# Patient Record
Sex: FEMALE | HISPANIC OR LATINO | Employment: OTHER | URBAN - METROPOLITAN AREA
[De-identification: names, ages, dates, MRNs, and addresses within clinical notes are randomized per-mention and may not be internally consistent; named-entity substitution may affect disease eponyms.]

---

## 2017-04-26 ENCOUNTER — HOSPITAL ENCOUNTER (OUTPATIENT)
Dept: LAB | Facility: MEDICAL CENTER | Age: 77
End: 2017-04-26
Attending: FAMILY MEDICINE
Payer: MEDICARE

## 2017-04-26 LAB
ALBUMIN SERPL BCP-MCNC: 3.9 G/DL (ref 3.2–4.9)
ALBUMIN/GLOB SERPL: 1 G/DL
ALP SERPL-CCNC: 86 U/L (ref 30–99)
ALT SERPL-CCNC: 16 U/L (ref 2–50)
ANION GAP SERPL CALC-SCNC: 7 MMOL/L (ref 0–11.9)
AST SERPL-CCNC: 20 U/L (ref 12–45)
BASOPHILS # BLD AUTO: 0.8 % (ref 0–1.8)
BASOPHILS # BLD: 0.06 K/UL (ref 0–0.12)
BILIRUB SERPL-MCNC: 0.5 MG/DL (ref 0.1–1.5)
BUN SERPL-MCNC: 14 MG/DL (ref 8–22)
CALCIUM SERPL-MCNC: 9.1 MG/DL (ref 8.5–10.5)
CHLORIDE SERPL-SCNC: 101 MMOL/L (ref 96–112)
CHOLEST SERPL-MCNC: 146 MG/DL (ref 100–199)
CO2 SERPL-SCNC: 31 MMOL/L (ref 20–33)
CREAT SERPL-MCNC: 0.64 MG/DL (ref 0.5–1.4)
EOSINOPHIL # BLD AUTO: 0.29 K/UL (ref 0–0.51)
EOSINOPHIL NFR BLD: 3.7 % (ref 0–6.9)
ERYTHROCYTE [DISTWIDTH] IN BLOOD BY AUTOMATED COUNT: 46.5 FL (ref 35.9–50)
EST. AVERAGE GLUCOSE BLD GHB EST-MCNC: 128 MG/DL
GFR SERPL CREATININE-BSD FRML MDRD: >60 ML/MIN/1.73 M 2
GLOBULIN SER CALC-MCNC: 4.1 G/DL (ref 1.9–3.5)
GLUCOSE SERPL-MCNC: 108 MG/DL (ref 65–99)
HBA1C MFR BLD: 6.1 % (ref 0–5.6)
HCT VFR BLD AUTO: 39.9 % (ref 37–47)
HDLC SERPL-MCNC: 60 MG/DL
HGB BLD-MCNC: 12.5 G/DL (ref 12–16)
IMM GRANULOCYTES # BLD AUTO: 0.02 K/UL (ref 0–0.11)
IMM GRANULOCYTES NFR BLD AUTO: 0.3 % (ref 0–0.9)
LDLC SERPL CALC-MCNC: 73 MG/DL
LYMPHOCYTES # BLD AUTO: 3.02 K/UL (ref 1–4.8)
LYMPHOCYTES NFR BLD: 38.8 % (ref 22–41)
MCH RBC QN AUTO: 27.5 PG (ref 27–33)
MCHC RBC AUTO-ENTMCNC: 31.3 G/DL (ref 33.6–35)
MCV RBC AUTO: 87.7 FL (ref 81.4–97.8)
MONOCYTES # BLD AUTO: 0.55 K/UL (ref 0–0.85)
MONOCYTES NFR BLD AUTO: 7.1 % (ref 0–13.4)
NEUTROPHILS # BLD AUTO: 3.85 K/UL (ref 2–7.15)
NEUTROPHILS NFR BLD: 49.3 % (ref 44–72)
NRBC # BLD AUTO: 0 K/UL
NRBC BLD AUTO-RTO: 0 /100 WBC
PLATELET # BLD AUTO: 296 K/UL (ref 164–446)
PMV BLD AUTO: 10.8 FL (ref 9–12.9)
POTASSIUM SERPL-SCNC: 3.5 MMOL/L (ref 3.6–5.5)
PROT SERPL-MCNC: 8 G/DL (ref 6–8.2)
RBC # BLD AUTO: 4.55 M/UL (ref 4.2–5.4)
SODIUM SERPL-SCNC: 139 MMOL/L (ref 135–145)
T4 FREE SERPL-MCNC: 1.25 NG/DL (ref 0.53–1.43)
TRIGL SERPL-MCNC: 65 MG/DL (ref 0–149)
TSH SERPL DL<=0.005 MIU/L-ACNC: 2.22 UIU/ML (ref 0.3–3.7)
WBC # BLD AUTO: 7.8 K/UL (ref 4.8–10.8)

## 2017-04-26 PROCEDURE — 80053 COMPREHEN METABOLIC PANEL: CPT

## 2017-04-26 PROCEDURE — 83036 HEMOGLOBIN GLYCOSYLATED A1C: CPT

## 2017-04-26 PROCEDURE — 84439 ASSAY OF FREE THYROXINE: CPT

## 2017-04-26 PROCEDURE — 85025 COMPLETE CBC W/AUTO DIFF WBC: CPT

## 2017-04-26 PROCEDURE — 36415 COLL VENOUS BLD VENIPUNCTURE: CPT

## 2017-04-26 PROCEDURE — 84443 ASSAY THYROID STIM HORMONE: CPT

## 2017-04-26 PROCEDURE — 80061 LIPID PANEL: CPT

## 2017-05-23 ENCOUNTER — HOSPITAL ENCOUNTER (OUTPATIENT)
Dept: RADIOLOGY | Facility: MEDICAL CENTER | Age: 77
End: 2017-05-23
Attending: OTOLARYNGOLOGY
Payer: MEDICARE

## 2017-05-23 DIAGNOSIS — H93.3X9: ICD-10-CM

## 2017-05-23 PROCEDURE — 70551 MRI BRAIN STEM W/O DYE: CPT

## 2017-08-17 ENCOUNTER — OFFICE VISIT (OUTPATIENT)
Dept: NEUROLOGY | Facility: MEDICAL CENTER | Age: 77
End: 2017-08-17
Payer: MEDICARE

## 2017-08-17 VITALS
DIASTOLIC BLOOD PRESSURE: 70 MMHG | BODY MASS INDEX: 29.77 KG/M2 | WEIGHT: 138 LBS | TEMPERATURE: 97.3 F | OXYGEN SATURATION: 98 % | SYSTOLIC BLOOD PRESSURE: 110 MMHG | HEIGHT: 57 IN | HEART RATE: 80 BPM | RESPIRATION RATE: 16 BRPM

## 2017-08-17 DIAGNOSIS — H81.4 VERTIGO, CENTRAL ORIGIN, UNSPECIFIED LATERALITY: ICD-10-CM

## 2017-08-17 DIAGNOSIS — I69.393 ATAXIA DUE TO OLD CEREBELLAR INFARCTION: ICD-10-CM

## 2017-08-17 PROCEDURE — 99203 OFFICE O/P NEW LOW 30 MIN: CPT

## 2017-08-17 RX ORDER — MECLIZINE HYDROCHLORIDE 25 MG/1
25 TABLET ORAL 3 TIMES DAILY PRN
Qty: 90 TAB | Refills: 3 | Status: SHIPPED | OUTPATIENT
Start: 2017-08-17

## 2017-08-17 RX ORDER — TRAVOPROST 0.04 MG/ML
SOLUTION/ DROPS OPHTHALMIC 2 TIMES DAILY
COMMUNITY
Start: 2017-08-16

## 2017-08-17 RX ORDER — DORZOLAMIDE HCL 20 MG/ML
SOLUTION/ DROPS OPHTHALMIC 2 TIMES DAILY
COMMUNITY
Start: 2017-08-03

## 2017-08-17 RX ORDER — BRIMONIDINE TARTRATE, TIMOLOL MALEATE 2; 5 MG/ML; MG/ML
SOLUTION/ DROPS OPHTHALMIC
Refills: 4 | COMMUNITY
Start: 2017-07-24

## 2017-08-17 NOTE — MR AVS SNAPSHOT
"        Debra Perezvan   2017 10:20 AM   Office Visit   MRN: [id number]    Department:  [facility]   Dept Phone:  [phone]    Description:  Female : 1940   Provider:  Fransisco Kenny M.D.           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   [681636]       Vertigo, central origin, unspecified laterality   [137447]         Vital Signs     Blood Pressure Pulse Temperature Respirations Height Weight    110/70 mmHg 80 36.3 °C (97.3 °F) 16 1.448 m (4' 9.01\") 62.596 kg (138 lb)    Body Mass Index Oxygen Saturation Smoking Status             29.85 kg/m2 98% Never Smoker          Basic Information     Date Of Birth Sex Race Ethnicity Preferred Language Language for Written Material    1940 Female White,  or   Origin (Polish,Guatemalan,Argentine,Lm, etc) Polish English      Problem List              ICD-10-CM Priority Class Noted - Resolved    Eye abnormality Q15.9   2014 - Present    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   2014 - Present    Essential hypertension I10   10/30/2014 - Present    Hypothyroidism E03.9   10/30/2014 - Present    Thoracic or lumbosacral neuritis or radiculitis, unspecified QMU4309   2015 - Present    Bell's palsy G51.0   2016 - Present    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   2016 - Present    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 H16.219   2016 - Present    Lower eyelid retraction of left eye H02.535   2016 - Present    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      Below and/or attached are the medications your provider expects you to take. Review all of your home medications and newly ordered medications with your provider and/or pharmacist. Follow medication instructions as directed by your provider and/or pharmacist. Please keep your medication list with you and share with your provider. Update the information when medications are discontinued, doses are changed, or new medications (including over-the-counter products) are added; and carry medication information at all times in the event of emergency situations     Allergies:  No Known Allergies          Medications  Valid as of: August 17, 2017 - 10:51 AM    Generic Name Brand Name Tablet Size Instructions for use    Aspirin (Tablet Delayed Response) ECOTRIN 81 MG Take 81 mg by mouth every day.        Brimonidine Tartrate-Timolol (Solution) COMBIGAN 0.2-0.5 % INSTILL ONE DROP IN BOTH EYES TWICE DAILY        Dorzolamide HCl (Solution) TRUSOPT 2 %         Levothyroxine Sodium (Tab) SYNTHROID 88 MCG Take 88 mcg by mouth Every morning on an empty stomach.        Losartan Potassium-HCTZ (Tab) HYZAAR 100-25 MG Take 1 Tab by mouth every morning.        Travoprost (Solution) TRAVATAN Z 0.004 %         .                 Medicines prescribed today were sent to:     [id number] - [place] - [address] [place] [address]    Phone: [phone] Fax: [phone]    Open 24 Hours?: No      Medication refill instructions:       If your prescription bottle indicates you have medication refills left, it is not necessary to call your provider’s office. Please contact your pharmacy and they will refill your medication.    If your prescription bottle  indicates you do not have any refills left, you may request refills at any time through one of the following ways: The online ethology system (except Urgent Care), by calling your provider’s office, or by asking your pharmacy to contact your provider’s office with a refill request. Medication refills are processed only during regular business hours and may not be available until the next business day. Your provider may request additional information or to have a follow-up visit with you prior to refilling your medication.   *Please Note: Medication refills are assigned a new Rx number when refilled electronically. Your pharmacy may indicate that no refills were authorized even though a new prescription for the same medication is available at the pharmacy. Please request the medicine by name with the pharmacy before contacting your provider for a refill.        Referral     A referral request has been sent to our patient care coordination department. Please allow 3-5 business days for us to process this request and contact you either by phone or mail. If you do not hear from us by the 5th business day, please call us at [phone].           ethology Status: Patient Declined

## 2017-08-17 NOTE — PROGRESS NOTES
Neurology Consult Note  8/17/2017      Referring MD:  Dr. GRUPO Garcia    Patient ID:  Name:             Debra Stockton   YOB: 1940  Age:                 77 y.o.  female   MRN:               [id number]                                              Reason for Consult:      Evaluation of balance and vertigo.    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 She had some improvement in vertigo following therapy and continues in therapy currently. She had an ear nose and throat evaluation including an apparent electronystagmogram that did not show for vestibular cause for her vertigo. 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 As a consequence of the surgery she had significant facial weakness facial numbness and ocular movement issues that it required corrective surgery including surgical surgery on the eyelid. 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 Her other medical conditions including hypothyroidism for which she is on thyroxine and medications for her glaucoma as well as losartan hydrochlorothiazide. According to her daughter who accompanied her there is been no change in her neurologic status recently. She has had MRIs done in 2014, 2015 and 2016 and more recently 2017. The cerebellar infarction and infarction of the left side of the vermis is unchanged through those studies. The small residual acoustic neuroma was best seen in the 2016 study and is unchanged in 2017 study.    Review of Systems: First to her balance issues.  Constitutional: Denies fevers, Denies weight changes  Eyes: Denies changes in vision, no eye pain  Ears/Nose/Throat/Mouth: Denies nasal congestion or  sore throat   Cardiovascular: Denies chest pain, Denies palpitations   Respiratory: Denies shortness of breath , Denies cough  Gastrointestinal/Hepatic: Denies abdominal pain, nausea, vomiting, diarrhea, constipation or GI bleeding   Genitourinary: Denies dysuria or frequency  Musculoskeletal/Rheum: Denies  joint pain and swelling, No edema  Skin: Denies rash  Neurological: Denies headache, confusion, memory loss or focal weakness/parasthesias  Psychiatric: denies mood disorder   Endocrine: Mohini thyroid problems  Heme/Oncology/Lymph Nodes: Denies enlarged lymph nodes, denies brusing or known bleeding disorder  All other systems were reviewed and are negative (AMA/CMS criteria)                Past Medical History:     Past Medical History   Diagnosis Date   • Unspecified disorder of thyroid    • Glaucoma    • Hypertension    • Unspecified essential hypertension 10/30/2014   • Unspecified hypothyroidism 10/30/2014   • Heart burn    • Indigestion    • Unspecified cataract      dylan IOL   • Other specified disorder of intestines      constipation   • Dental disorder      dentures Upper and lower       Problems addressed this visit:    Problem List Items Addressed This Visit     None      Visit Diagnoses     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 at [facility]   • Appendectomy  1996   • Lid tightening  8/27/2014     Performed by Omer Servin M.D. at [facility]   • Lumbar laminectomy diskectomy Left 7/17/2015     Procedure:  LUMBAR LAMINECTOMY DISKECTOMY POSTERIOR L5-S1 MICRO;  Surgeon: Abel Salazar M.D.;  Location: [facility];  Service:    • Other  2-3yrs ago     l eye  with multiple surgeries   • Other  2004     left ear tumor removed   • Other  1952     tonsillectomy   • Eye foreign body removal Left 1/29/2016     Procedure: EYE FOREIGN BODY REMOVAL  FOR: REMOVAL OF UPPER EYELID WEIGHT IMPLANT;  Surgeon: Alphonso Montoya M.D.;  Location: SURGERY SAME DAY [facility];  Service:    • Blepharoplasty Left 9/7/2016     Procedure:  PERMANENT TEMPORAL PARTIAL TARSORRHAPHY;  Surgeon: Alphonso Montoya M.D.;  Location: SURGERY [facility];  Service:          Current Outpatient Medications:  Current Outpatient Prescriptions   Medication   • COMBIGAN 0.2-0.5 % Solution   • dorzolamide (TRUSOPT) 2 % Solution   • TRAVATAN Z 0.004 % Solution   • aspirin EC (ECOTRIN) 81 MG Tablet Delayed Response   • levothyroxine (SYNTHROID) 88 MCG TABS   • losartan-hydrochlorothiazide (HYZAAR) 100-25 MG per tablet     No current facility-administered medications for this visit.       Medication Allergy:  No Known Allergies    Family History:  Family History   Problem Relation Age of Onset   • Diabetes Mother    • Cancer Father    • Diabetes Father    • Diabetes Brother        Social History:  Social History     Social History   • Marital Status:      Spouse Name: N/A   • Number of Children: N/A   • Years of Education: N/A     Occupational History   • Not on file.     Social History Main Topics   • Smoking status: Never Smoker    • Smokeless tobacco: Not on file   • Alcohol Use: No   • Drug Use: No   • Sexual Activity: Not on file     Other Topics Concern   • Not on file     Social History Narrative       Physical Exam: This very pleasant Divehi-speaking lady. She has facial Waldron on the left in at least the first and second divisions and decreased pinprick also in those decisions. Her right eye has nystagmus on left gaze. She has had  "fluid surgery on the left eye which is mostly obscured due to corneal angiogenesis. When she walks she has a wide-based gait and does tend to lean to the left side. She has some heel-to-shin dystaxia in the lower extremities but has good position sense and intact reflexes.  Vitals:   Filed Vitals:    08/17/17 1003   BP: 110/70   Pulse: 80   Temp: 36.3 °C (97.3 °F)   Resp: 16   Height: 1.448 m (4' 9.01\")   Weight: 62.596 kg (138 lb)   SpO2: 98%     General Appearance:   Weight/BMI: Body mass index is 29.85 kg/(m^2).    Neurologic Exam:   AOx3: Normal  Recent & remote memory intact: Yes  Attentive with normal coordination: Yes  Normal spontaneous speech pattern: Yes  Age appropriate fund of knowledge: No decreased vision left eye  Cranial nerve II intact: Abnormal decreased movement of the left eye and nystagmus of the right eye on left gaze  Cranial nerve III, IV & VI intact: Abnormal decreased movement of the left eye and nystagmus of the right eye on left gaze  Cranial nerve V intact: Abnormal decreased facial sensation and strength  Cranial nerve VIII intact: Abnormal decreased facial strength and sensation  Cranial nerve IX intact: Normal  Cranial nerve XI intact: Normal  Cranial nerve XII intact: Normal  No sensory deficits: Abnormal decreased facial sensation  DTRs intact & symmetrical: Normal   No dysdiadochokinesia or dysmetria: Abnormal lower extremity dystaxia    Eyes:  Normal optic discs: No blindness left eye  Visual field: Normal  Pupillary responses: Normal  Extraocular movement: Abnormal decreased movement of the left    Cardiovascular:  Normal carotid pulses bilaterally: Yes  RRR with no MRGs: Yes  No peripheral edema, pulses intact: Yes    Musculoskeletal:  Normal gait and station: No ataxia due to cerebellar disease  Normal muscle strength: Yes  Normal muscle tone, no atrophy: Yes  No abnormal movements: Yes    Plan:   I reviewed all of the MRIs and see no significant change from 2014 through 2017 " and what differences are in the report are largely related to differences in field strength of the various instruments. Most of her vertigo and ataxia is related to the cerebellar injury that occurred during the surgery. There is no remedy for this same physical therapy which was ordered for her. She will be seen again as needed.    Total length of time of this visit was 40 minutes of which greater than 50% was spent counseling the patient/family and coordinating care.    Thank you for allowing me to participate in his care.    Sincerely yours,        Fransisco Kenny MD, PhD

## 2017-08-28 ENCOUNTER — TELEPHONE (OUTPATIENT)
Dept: NEUROLOGY | Facility: MEDICAL CENTER | Age: 77
End: 2017-08-28

## 2017-08-28 NOTE — TELEPHONE ENCOUNTER
Dr. Beal referred the pt to PT, referral dept sent the pt to [facility] but the pt is already established with [facility]. And they are asking for a new referral to their office. Will you please put a new referral in Dr. Beal's absence. Thank you.

## 2017-09-07 DIAGNOSIS — R27.0 ATAXIA: ICD-10-CM

## 2017-09-07 NOTE — TELEPHONE ENCOUNTER
Message   Received: Today   Message Contents   Tiffanie Frost P.A.-C.  Brinda Galeano, Med Ass't   Caller: Unspecified (1 week ago)             done      Princess pt's relative was informed. Per her request referral was faxed to her at [phone]. Fax confirmation is scanned into media.

## 2017-09-16 ENCOUNTER — HOSPITAL ENCOUNTER (OUTPATIENT)
Dept: RADIOLOGY | Facility: MEDICAL CENTER | Age: 77
End: 2017-09-16
Attending: SURGERY
Payer: MEDICARE

## 2017-09-16 DIAGNOSIS — I82.411 DEEP VEIN THROMBOSIS (DVT) OF FEMORAL VEIN OF RIGHT LOWER EXTREMITY, UNSPECIFIED CHRONICITY (HCC): ICD-10-CM

## 2017-09-16 PROCEDURE — 93971 EXTREMITY STUDY: CPT | Mod: 26 | Performed by: SURGERY

## 2017-09-16 PROCEDURE — 93971 EXTREMITY STUDY: CPT

## 2017-09-26 ENCOUNTER — HOSPITAL ENCOUNTER (OUTPATIENT)
Dept: LAB | Facility: MEDICAL CENTER | Age: 77
End: 2017-09-26
Attending: FAMILY MEDICINE
Payer: MEDICARE

## 2017-09-26 LAB
ALBUMIN SERPL BCP-MCNC: 3.6 G/DL (ref 3.2–4.9)
ALBUMIN/GLOB SERPL: 0.8 G/DL
ALP SERPL-CCNC: 77 U/L (ref 30–99)
ALT SERPL-CCNC: 10 U/L (ref 2–50)
ANION GAP SERPL CALC-SCNC: 7 MMOL/L (ref 0–11.9)
AST SERPL-CCNC: 15 U/L (ref 12–45)
BILIRUB SERPL-MCNC: 0.5 MG/DL (ref 0.1–1.5)
BUN SERPL-MCNC: 23 MG/DL (ref 8–22)
CALCIUM SERPL-MCNC: 8.9 MG/DL (ref 8.5–10.5)
CHLORIDE SERPL-SCNC: 101 MMOL/L (ref 96–112)
CHOLEST SERPL-MCNC: 137 MG/DL (ref 100–199)
CO2 SERPL-SCNC: 31 MMOL/L (ref 20–33)
CREAT SERPL-MCNC: 1 MG/DL (ref 0.5–1.4)
EST. AVERAGE GLUCOSE BLD GHB EST-MCNC: 134 MG/DL
GFR SERPL CREATININE-BSD FRML MDRD: 54 ML/MIN/1.73 M 2
GLOBULIN SER CALC-MCNC: 4.3 G/DL (ref 1.9–3.5)
GLUCOSE SERPL-MCNC: 104 MG/DL (ref 65–99)
HBA1C MFR BLD: 6.3 % (ref 0–5.6)
HDLC SERPL-MCNC: 50 MG/DL
LDLC SERPL CALC-MCNC: 70 MG/DL
POTASSIUM SERPL-SCNC: 3.9 MMOL/L (ref 3.6–5.5)
PROT SERPL-MCNC: 7.9 G/DL (ref 6–8.2)
SODIUM SERPL-SCNC: 139 MMOL/L (ref 135–145)
T4 FREE SERPL-MCNC: 1.21 NG/DL (ref 0.53–1.43)
TRIGL SERPL-MCNC: 87 MG/DL (ref 0–149)
TSH SERPL DL<=0.005 MIU/L-ACNC: 5.13 UIU/ML (ref 0.3–3.7)

## 2017-09-26 PROCEDURE — 84443 ASSAY THYROID STIM HORMONE: CPT

## 2017-09-26 PROCEDURE — 80053 COMPREHEN METABOLIC PANEL: CPT

## 2017-09-26 PROCEDURE — 83036 HEMOGLOBIN GLYCOSYLATED A1C: CPT

## 2017-09-26 PROCEDURE — 36415 COLL VENOUS BLD VENIPUNCTURE: CPT

## 2017-09-26 PROCEDURE — 84439 ASSAY OF FREE THYROXINE: CPT

## 2017-09-26 PROCEDURE — 80061 LIPID PANEL: CPT

## 2017-12-05 ENCOUNTER — TELEPHONE (OUTPATIENT)
Dept: NEUROLOGY | Facility: MEDICAL CENTER | Age: 77
End: 2017-12-05

## 2017-12-06 DIAGNOSIS — G11.9 ATAXIA DUE TO CEREBELLAR DEGENERATION (HCC): ICD-10-CM

## 2017-12-11 NOTE — TELEPHONE ENCOUNTER
Message   Received: 5 days ago   Message Contents   Fransisco Kenny M.D.  Brinda Galeano, Med Ass't   Caller: Unspecified (6 days ago, 11:24 AM)             Referral done     Referral faxed to [facility] fax confirmation is scanned into media.

## 2018-02-07 ENCOUNTER — HOSPITAL ENCOUNTER (OUTPATIENT)
Dept: RADIOLOGY | Facility: MEDICAL CENTER | Age: 78
End: 2018-02-07
Attending: FAMILY MEDICINE
Payer: MEDICARE

## 2018-02-07 DIAGNOSIS — M85.88 OTHER SPECIFIED DISORDERS OF BONE DENSITY AND STRUCTURE, OTHER SITE: ICD-10-CM

## 2018-02-07 PROCEDURE — 77080 DXA BONE DENSITY AXIAL: CPT

## 2018-02-08 ENCOUNTER — APPOINTMENT (OUTPATIENT)
Dept: RADIOLOGY | Facility: MEDICAL CENTER | Age: 78
End: 2018-02-08
Attending: FAMILY MEDICINE
Payer: MEDICARE

## 2018-04-27 ENCOUNTER — HOSPITAL ENCOUNTER (OUTPATIENT)
Dept: RADIOLOGY | Facility: MEDICAL CENTER | Age: 78
End: 2018-04-27
Attending: FAMILY MEDICINE
Payer: MEDICARE

## 2018-04-27 DIAGNOSIS — M54.16 LUMBAR RADICULOPATHY: ICD-10-CM

## 2018-04-27 PROCEDURE — 72148 MRI LUMBAR SPINE W/O DYE: CPT

## 2018-06-05 ENCOUNTER — HOSPITAL ENCOUNTER (OUTPATIENT)
Dept: RADIOLOGY | Facility: MEDICAL CENTER | Age: 78
End: 2018-06-05
Attending: CLINICAL NURSE SPECIALIST
Payer: MEDICARE

## 2018-06-05 DIAGNOSIS — R26.9 ABNORMALITY OF GAIT: ICD-10-CM

## 2018-06-05 DIAGNOSIS — M54.12 CERVICAL RADICULITIS: ICD-10-CM

## 2018-06-05 PROCEDURE — 72141 MRI NECK SPINE W/O DYE: CPT

## 2018-06-05 PROCEDURE — 70551 MRI BRAIN STEM W/O DYE: CPT

## 2018-06-15 DIAGNOSIS — Z01.812 PRE-PROCEDURAL LABORATORY EXAMINATION: ICD-10-CM

## 2018-06-15 DIAGNOSIS — Z01.810 PRE-OPERATIVE CARDIOVASCULAR EXAMINATION: ICD-10-CM

## 2018-06-15 LAB
ABO GROUP BLD: NORMAL
ANION GAP SERPL CALC-SCNC: 9 MMOL/L (ref 0–11.9)
APPEARANCE UR: CLEAR
APTT PPP: 32.9 SEC (ref 24.7–36)
BACTERIA #/AREA URNS HPF: NEGATIVE /HPF
BASOPHILS # BLD AUTO: 0.5 % (ref 0–1.8)
BASOPHILS # BLD: 0.04 K/UL (ref 0–0.12)
BILIRUB UR QL STRIP.AUTO: NEGATIVE
BLD GP AB SCN SERPL QL: NORMAL
BUN SERPL-MCNC: 18 MG/DL (ref 8–22)
CALCIUM SERPL-MCNC: 9.3 MG/DL (ref 8.5–10.5)
CHLORIDE SERPL-SCNC: 102 MMOL/L (ref 96–112)
CO2 SERPL-SCNC: 29 MMOL/L (ref 20–33)
COLOR UR: YELLOW
CREAT SERPL-MCNC: 0.88 MG/DL (ref 0.5–1.4)
EKG IMPRESSION: NORMAL
EOSINOPHIL # BLD AUTO: 0.16 K/UL (ref 0–0.51)
EOSINOPHIL NFR BLD: 2.2 % (ref 0–6.9)
EPI CELLS #/AREA URNS HPF: ABNORMAL /HPF
ERYTHROCYTE [DISTWIDTH] IN BLOOD BY AUTOMATED COUNT: 48 FL (ref 35.9–50)
GLUCOSE SERPL-MCNC: 102 MG/DL (ref 65–99)
GLUCOSE UR STRIP.AUTO-MCNC: NEGATIVE MG/DL
HCT VFR BLD AUTO: 39.6 % (ref 37–47)
HGB BLD-MCNC: 12.5 G/DL (ref 12–16)
HYALINE CASTS #/AREA URNS LPF: ABNORMAL /LPF
IMM GRANULOCYTES # BLD AUTO: 0.03 K/UL (ref 0–0.11)
IMM GRANULOCYTES NFR BLD AUTO: 0.4 % (ref 0–0.9)
INR PPP: 1.08 (ref 0.87–1.13)
KETONES UR STRIP.AUTO-MCNC: NEGATIVE MG/DL
LEUKOCYTE ESTERASE UR QL STRIP.AUTO: ABNORMAL
LYMPHOCYTES # BLD AUTO: 2.13 K/UL (ref 1–4.8)
LYMPHOCYTES NFR BLD: 28.8 % (ref 22–41)
MCH RBC QN AUTO: 27.4 PG (ref 27–33)
MCHC RBC AUTO-ENTMCNC: 31.6 G/DL (ref 33.6–35)
MCV RBC AUTO: 86.7 FL (ref 81.4–97.8)
MICRO URNS: ABNORMAL
MONOCYTES # BLD AUTO: 0.49 K/UL (ref 0–0.85)
MONOCYTES NFR BLD AUTO: 6.6 % (ref 0–13.4)
NEUTROPHILS # BLD AUTO: 4.55 K/UL (ref 2–7.15)
NEUTROPHILS NFR BLD: 61.5 % (ref 44–72)
NITRITE UR QL STRIP.AUTO: NEGATIVE
NRBC # BLD AUTO: 0 K/UL
NRBC BLD-RTO: 0 /100 WBC
PH UR STRIP.AUTO: 6.5 [PH]
PLATELET # BLD AUTO: 316 K/UL (ref 164–446)
PMV BLD AUTO: 10.5 FL (ref 9–12.9)
POTASSIUM SERPL-SCNC: 3.3 MMOL/L (ref 3.6–5.5)
PROT UR QL STRIP: NEGATIVE MG/DL
PROTHROMBIN TIME: 13.7 SEC (ref 12–14.6)
RBC # BLD AUTO: 4.57 M/UL (ref 4.2–5.4)
RBC # URNS HPF: ABNORMAL /HPF
RBC UR QL AUTO: ABNORMAL
RH BLD: NORMAL
SODIUM SERPL-SCNC: 140 MMOL/L (ref 135–145)
SP GR UR STRIP.AUTO: 1.01
UROBILINOGEN UR STRIP.AUTO-MCNC: 0.2 MG/DL
WBC # BLD AUTO: 7.4 K/UL (ref 4.8–10.8)
WBC #/AREA URNS HPF: ABNORMAL /HPF

## 2018-06-15 PROCEDURE — 93010 ELECTROCARDIOGRAM REPORT: CPT | Performed by: INTERNAL MEDICINE

## 2018-06-15 PROCEDURE — 81001 URINALYSIS AUTO W/SCOPE: CPT

## 2018-06-15 PROCEDURE — 93005 ELECTROCARDIOGRAM TRACING: CPT

## 2018-06-15 PROCEDURE — 86901 BLOOD TYPING SEROLOGIC RH(D): CPT

## 2018-06-15 PROCEDURE — 85025 COMPLETE CBC W/AUTO DIFF WBC: CPT

## 2018-06-15 PROCEDURE — 80048 BASIC METABOLIC PNL TOTAL CA: CPT

## 2018-06-15 PROCEDURE — 85730 THROMBOPLASTIN TIME PARTIAL: CPT

## 2018-06-15 PROCEDURE — 86850 RBC ANTIBODY SCREEN: CPT

## 2018-06-15 PROCEDURE — 36415 COLL VENOUS BLD VENIPUNCTURE: CPT

## 2018-06-15 PROCEDURE — 86900 BLOOD TYPING SEROLOGIC ABO: CPT

## 2018-06-15 PROCEDURE — 85610 PROTHROMBIN TIME: CPT

## 2018-06-15 PROCEDURE — 87086 URINE CULTURE/COLONY COUNT: CPT

## 2018-06-15 RX ORDER — LEVOTHYROXINE SODIUM 0.07 MG/1
75 TABLET ORAL
COMMUNITY
End: 2023-08-31

## 2018-06-17 LAB
BACTERIA UR CULT: NORMAL
SIGNIFICANT IND 70042: NORMAL
SITE SITE: NORMAL
SOURCE SOURCE: NORMAL

## 2018-06-25 ENCOUNTER — APPOINTMENT (OUTPATIENT)
Dept: RADIOLOGY | Facility: MEDICAL CENTER | Age: 78
DRG: 472 | End: 2018-06-25
Attending: NEUROLOGICAL SURGERY
Payer: MEDICARE

## 2018-06-25 ENCOUNTER — HOSPITAL ENCOUNTER (INPATIENT)
Facility: MEDICAL CENTER | Age: 78
LOS: 3 days | DRG: 472 | End: 2018-06-28
Attending: NEUROLOGICAL SURGERY | Admitting: NEUROLOGICAL SURGERY
Payer: MEDICARE

## 2018-06-25 DIAGNOSIS — G95.9 CERVICAL MYELOPATHY WITH CERVICAL RADICULOPATHY (HCC): ICD-10-CM

## 2018-06-25 DIAGNOSIS — M54.12 CERVICAL MYELOPATHY WITH CERVICAL RADICULOPATHY (HCC): ICD-10-CM

## 2018-06-25 LAB
ABO GROUP BLD: NORMAL
BLD GP AB SCN SERPL QL: NORMAL
RH BLD: NORMAL

## 2018-06-25 PROCEDURE — 160002 HCHG RECOVERY MINUTES (STAT): Performed by: NEUROLOGICAL SURGERY

## 2018-06-25 PROCEDURE — 110371 HCHG SHELL REV 272: Performed by: NEUROLOGICAL SURGERY

## 2018-06-25 PROCEDURE — 700111 HCHG RX REV CODE 636 W/ 250 OVERRIDE (IP)

## 2018-06-25 PROCEDURE — 95937 NEUROMUSCULAR JUNCTION TEST: CPT | Performed by: NEUROLOGICAL SURGERY

## 2018-06-25 PROCEDURE — C1821 INTERSPINOUS IMPLANT: HCPCS | Performed by: NEUROLOGICAL SURGERY

## 2018-06-25 PROCEDURE — A6402 STERILE GAUZE <= 16 SQ IN: HCPCS | Performed by: NEUROLOGICAL SURGERY

## 2018-06-25 PROCEDURE — 160009 HCHG ANES TIME/MIN: Performed by: NEUROLOGICAL SURGERY

## 2018-06-25 PROCEDURE — 700101 HCHG RX REV CODE 250

## 2018-06-25 PROCEDURE — 160036 HCHG PACU - EA ADDL 30 MINS PHASE I: Performed by: NEUROLOGICAL SURGERY

## 2018-06-25 PROCEDURE — 86901 BLOOD TYPING SEROLOGIC RH(D): CPT

## 2018-06-25 PROCEDURE — 700112 HCHG RX REV CODE 229: Performed by: NURSE PRACTITIONER

## 2018-06-25 PROCEDURE — 160041 HCHG SURGERY MINUTES - EA ADDL 1 MIN LEVEL 4: Performed by: NEUROLOGICAL SURGERY

## 2018-06-25 PROCEDURE — C1713 ANCHOR/SCREW BN/BN,TIS/BN: HCPCS | Performed by: NEUROLOGICAL SURGERY

## 2018-06-25 PROCEDURE — 95940 IONM IN OPERATNG ROOM 15 MIN: CPT | Performed by: NEUROLOGICAL SURGERY

## 2018-06-25 PROCEDURE — 160035 HCHG PACU - 1ST 60 MINS PHASE I: Performed by: NEUROLOGICAL SURGERY

## 2018-06-25 PROCEDURE — 86850 RBC ANTIBODY SCREEN: CPT

## 2018-06-25 PROCEDURE — 95939 C MOTOR EVOKED UPR&LWR LIMBS: CPT | Performed by: NEUROLOGICAL SURGERY

## 2018-06-25 PROCEDURE — 700111 HCHG RX REV CODE 636 W/ 250 OVERRIDE (IP): Performed by: NURSE PRACTITIONER

## 2018-06-25 PROCEDURE — 4A11X4G MONITORING OF PERIPHERAL NERVOUS ELECTRICAL ACTIVITY, INTRAOPERATIVE, EXTERNAL APPROACH: ICD-10-PCS | Performed by: NEUROLOGICAL SURGERY

## 2018-06-25 PROCEDURE — 110454 HCHG SHELL REV 250: Performed by: NEUROLOGICAL SURGERY

## 2018-06-25 PROCEDURE — 501838 HCHG SUTURE GENERAL: Performed by: NEUROLOGICAL SURGERY

## 2018-06-25 PROCEDURE — 700102 HCHG RX REV CODE 250 W/ 637 OVERRIDE(OP): Performed by: NURSE PRACTITIONER

## 2018-06-25 PROCEDURE — 0RG20A0 FUSION OF 2 OR MORE CERVICAL VERTEBRAL JOINTS WITH INTERBODY FUSION DEVICE, ANTERIOR APPROACH, ANTERIOR COLUMN, OPEN APPROACH: ICD-10-PCS | Performed by: NEUROLOGICAL SURGERY

## 2018-06-25 PROCEDURE — A9270 NON-COVERED ITEM OR SERVICE: HCPCS | Performed by: NURSE PRACTITIONER

## 2018-06-25 PROCEDURE — 500864 HCHG NEEDLE, SPINAL 18G: Performed by: NEUROLOGICAL SURGERY

## 2018-06-25 PROCEDURE — 160048 HCHG OR STATISTICAL LEVEL 1-5: Performed by: NEUROLOGICAL SURGERY

## 2018-06-25 PROCEDURE — A9270 NON-COVERED ITEM OR SERVICE: HCPCS

## 2018-06-25 PROCEDURE — 95861 NEEDLE EMG 2 EXTREMITIES: CPT | Performed by: NEUROLOGICAL SURGERY

## 2018-06-25 PROCEDURE — 72040 X-RAY EXAM NECK SPINE 2-3 VW: CPT

## 2018-06-25 PROCEDURE — 95938 SOMATOSENSORY TESTING: CPT | Performed by: NEUROLOGICAL SURGERY

## 2018-06-25 PROCEDURE — 700102 HCHG RX REV CODE 250 W/ 637 OVERRIDE(OP)

## 2018-06-25 PROCEDURE — 86900 BLOOD TYPING SEROLOGIC ABO: CPT

## 2018-06-25 PROCEDURE — 0RT30ZZ RESECTION OF CERVICAL VERTEBRAL DISC, OPEN APPROACH: ICD-10-PCS | Performed by: NEUROLOGICAL SURGERY

## 2018-06-25 PROCEDURE — 770001 HCHG ROOM/CARE - MED/SURG/GYN PRIV*

## 2018-06-25 PROCEDURE — 160029 HCHG SURGERY MINUTES - 1ST 30 MINS LEVEL 4: Performed by: NEUROLOGICAL SURGERY

## 2018-06-25 PROCEDURE — 500122 HCHG BOVIE, BLADE: Performed by: NEUROLOGICAL SURGERY

## 2018-06-25 PROCEDURE — 700101 HCHG RX REV CODE 250: Performed by: NURSE PRACTITIONER

## 2018-06-25 DEVICE — IMPLANTABLE DEVICE: Type: IMPLANTABLE DEVICE | Status: FUNCTIONAL

## 2018-06-25 DEVICE — SCREW VARIABLE ANGLE XTEND SELF-DRILLING 4.2MM 14MM (1TCONX10=10): Type: IMPLANTABLE DEVICE | Status: FUNCTIONAL

## 2018-06-25 RX ORDER — METHOCARBAMOL 750 MG/1
750 TABLET, FILM COATED ORAL EVERY 8 HOURS PRN
Status: DISCONTINUED | OUTPATIENT
Start: 2018-06-25 | End: 2018-06-27

## 2018-06-25 RX ORDER — SODIUM CHLORIDE AND POTASSIUM CHLORIDE 150; 900 MG/100ML; MG/100ML
INJECTION, SOLUTION INTRAVENOUS CONTINUOUS
Status: DISCONTINUED | OUTPATIENT
Start: 2018-06-25 | End: 2018-06-28 | Stop reason: HOSPADM

## 2018-06-25 RX ORDER — LOSARTAN POTASSIUM AND HYDROCHLOROTHIAZIDE 25; 100 MG/1; MG/1
1 TABLET ORAL EVERY MORNING
Status: DISCONTINUED | OUTPATIENT
Start: 2018-06-26 | End: 2018-06-25

## 2018-06-25 RX ORDER — METHOCARBAMOL 750 MG/1
TABLET, FILM COATED ORAL
Status: COMPLETED
Start: 2018-06-25 | End: 2018-06-25

## 2018-06-25 RX ORDER — BISACODYL 10 MG
10 SUPPOSITORY, RECTAL RECTAL
Status: DISCONTINUED | OUTPATIENT
Start: 2018-06-25 | End: 2018-06-28 | Stop reason: HOSPADM

## 2018-06-25 RX ORDER — SODIUM CHLORIDE, SODIUM LACTATE, POTASSIUM CHLORIDE, CALCIUM CHLORIDE 600; 310; 30; 20 MG/100ML; MG/100ML; MG/100ML; MG/100ML
INJECTION, SOLUTION INTRAVENOUS CONTINUOUS
Status: DISCONTINUED | OUTPATIENT
Start: 2018-06-25 | End: 2018-06-28 | Stop reason: HOSPADM

## 2018-06-25 RX ORDER — AMOXICILLIN 250 MG
1 CAPSULE ORAL
Status: DISCONTINUED | OUTPATIENT
Start: 2018-06-25 | End: 2018-06-28 | Stop reason: HOSPADM

## 2018-06-25 RX ORDER — HALOPERIDOL 5 MG/ML
INJECTION INTRAMUSCULAR
Status: COMPLETED
Start: 2018-06-25 | End: 2018-06-25

## 2018-06-25 RX ORDER — HYDROCHLOROTHIAZIDE 25 MG/1
25 TABLET ORAL
Status: DISCONTINUED | OUTPATIENT
Start: 2018-06-26 | End: 2018-06-28 | Stop reason: HOSPADM

## 2018-06-25 RX ORDER — LIDOCAINE HYDROCHLORIDE 10 MG/ML
INJECTION, SOLUTION INFILTRATION; PERINEURAL
Status: COMPLETED
Start: 2018-06-25 | End: 2018-06-25

## 2018-06-25 RX ORDER — BUPIVACAINE HYDROCHLORIDE AND EPINEPHRINE 5; 5 MG/ML; UG/ML
INJECTION, SOLUTION EPIDURAL; INTRACAUDAL; PERINEURAL
Status: DISCONTINUED | OUTPATIENT
Start: 2018-06-25 | End: 2018-06-25 | Stop reason: HOSPADM

## 2018-06-25 RX ORDER — TRAVOPROST OPHTHALMIC SOLUTION 0.04 MG/ML
1 SOLUTION OPHTHALMIC 2 TIMES DAILY
Status: DISCONTINUED | OUTPATIENT
Start: 2018-06-25 | End: 2018-06-25

## 2018-06-25 RX ORDER — DIPHENHYDRAMINE HYDROCHLORIDE 50 MG/ML
25 INJECTION INTRAMUSCULAR; INTRAVENOUS EVERY 6 HOURS PRN
Status: DISCONTINUED | OUTPATIENT
Start: 2018-06-25 | End: 2018-06-28 | Stop reason: HOSPADM

## 2018-06-25 RX ORDER — CALCIUM CARBONATE 500 MG/1
500 TABLET, CHEWABLE ORAL 2 TIMES DAILY
Status: DISCONTINUED | OUTPATIENT
Start: 2018-06-25 | End: 2018-06-28 | Stop reason: HOSPADM

## 2018-06-25 RX ORDER — HYDRALAZINE HYDROCHLORIDE 20 MG/ML
10 INJECTION INTRAMUSCULAR; INTRAVENOUS
Status: DISCONTINUED | OUTPATIENT
Start: 2018-06-25 | End: 2018-06-28 | Stop reason: HOSPADM

## 2018-06-25 RX ORDER — OXYCODONE HYDROCHLORIDE 5 MG/1
5 TABLET ORAL
Status: DISCONTINUED | OUTPATIENT
Start: 2018-06-25 | End: 2018-06-28 | Stop reason: HOSPADM

## 2018-06-25 RX ORDER — LABETALOL HYDROCHLORIDE 5 MG/ML
10 INJECTION, SOLUTION INTRAVENOUS
Status: DISCONTINUED | OUTPATIENT
Start: 2018-06-25 | End: 2018-06-28 | Stop reason: HOSPADM

## 2018-06-25 RX ORDER — ONDANSETRON 4 MG/1
4 TABLET, ORALLY DISINTEGRATING ORAL EVERY 4 HOURS PRN
Status: DISCONTINUED | OUTPATIENT
Start: 2018-06-25 | End: 2018-06-28 | Stop reason: HOSPADM

## 2018-06-25 RX ORDER — ENEMA 19; 7 G/133ML; G/133ML
1 ENEMA RECTAL
Status: DISCONTINUED | OUTPATIENT
Start: 2018-06-25 | End: 2018-06-28 | Stop reason: HOSPADM

## 2018-06-25 RX ORDER — DOCUSATE SODIUM 100 MG/1
100 CAPSULE, LIQUID FILLED ORAL 2 TIMES DAILY
Status: DISCONTINUED | OUTPATIENT
Start: 2018-06-25 | End: 2018-06-28 | Stop reason: HOSPADM

## 2018-06-25 RX ORDER — CLONIDINE HYDROCHLORIDE 0.1 MG/1
0.1 TABLET ORAL EVERY 4 HOURS PRN
Status: DISCONTINUED | OUTPATIENT
Start: 2018-06-25 | End: 2018-06-28 | Stop reason: HOSPADM

## 2018-06-25 RX ORDER — BRIMONIDINE TARTRATE AND TIMOLOL MALEATE 2; 5 MG/ML; MG/ML
1 SOLUTION OPHTHALMIC DAILY
Status: DISCONTINUED | OUTPATIENT
Start: 2018-06-26 | End: 2018-06-25

## 2018-06-25 RX ORDER — LATANOPROST 50 UG/ML
1 SOLUTION/ DROPS OPHTHALMIC EVERY EVENING
Status: DISCONTINUED | OUTPATIENT
Start: 2018-06-25 | End: 2018-06-28 | Stop reason: HOSPADM

## 2018-06-25 RX ORDER — ACETAMINOPHEN 500 MG
1000 TABLET ORAL EVERY 6 HOURS
Status: DISCONTINUED | OUTPATIENT
Start: 2018-06-26 | End: 2018-06-28 | Stop reason: HOSPADM

## 2018-06-25 RX ORDER — DORZOLAMIDE HCL 20 MG/ML
1 SOLUTION/ DROPS OPHTHALMIC 2 TIMES DAILY
Status: DISCONTINUED | OUTPATIENT
Start: 2018-06-25 | End: 2018-06-28 | Stop reason: HOSPADM

## 2018-06-25 RX ORDER — AMOXICILLIN 250 MG
1 CAPSULE ORAL NIGHTLY
Status: DISCONTINUED | OUTPATIENT
Start: 2018-06-25 | End: 2018-06-28 | Stop reason: HOSPADM

## 2018-06-25 RX ORDER — OXYCODONE HYDROCHLORIDE 10 MG/1
10 TABLET ORAL
Status: DISCONTINUED | OUTPATIENT
Start: 2018-06-25 | End: 2018-06-28 | Stop reason: HOSPADM

## 2018-06-25 RX ORDER — LOSARTAN POTASSIUM 50 MG/1
100 TABLET ORAL
Status: DISCONTINUED | OUTPATIENT
Start: 2018-06-26 | End: 2018-06-28 | Stop reason: HOSPADM

## 2018-06-25 RX ORDER — LEVOTHYROXINE SODIUM 0.07 MG/1
75 TABLET ORAL
Status: DISCONTINUED | OUTPATIENT
Start: 2018-06-26 | End: 2018-06-28 | Stop reason: HOSPADM

## 2018-06-25 RX ORDER — BACITRACIN 50000 [IU]/1
INJECTION, POWDER, FOR SOLUTION INTRAMUSCULAR
Status: DISCONTINUED | OUTPATIENT
Start: 2018-06-25 | End: 2018-06-25 | Stop reason: HOSPADM

## 2018-06-25 RX ORDER — MORPHINE SULFATE 4 MG/ML
4 INJECTION, SOLUTION INTRAMUSCULAR; INTRAVENOUS
Status: DISCONTINUED | OUTPATIENT
Start: 2018-06-25 | End: 2018-06-28 | Stop reason: HOSPADM

## 2018-06-25 RX ORDER — DIAZEPAM 2 MG/1
5 TABLET ORAL EVERY 4 HOURS PRN
Status: DISCONTINUED | OUTPATIENT
Start: 2018-06-25 | End: 2018-06-27

## 2018-06-25 RX ORDER — CEFAZOLIN SODIUM 2 G/100ML
2 INJECTION, SOLUTION INTRAVENOUS EVERY 8 HOURS
Status: COMPLETED | OUTPATIENT
Start: 2018-06-26 | End: 2018-06-26

## 2018-06-25 RX ORDER — DIPHENHYDRAMINE HCL 25 MG
25 TABLET ORAL EVERY 6 HOURS PRN
Status: DISCONTINUED | OUTPATIENT
Start: 2018-06-25 | End: 2018-06-28 | Stop reason: HOSPADM

## 2018-06-25 RX ORDER — MECLIZINE HYDROCHLORIDE 25 MG/1
25 TABLET ORAL 3 TIMES DAILY PRN
Status: DISCONTINUED | OUTPATIENT
Start: 2018-06-25 | End: 2018-06-28 | Stop reason: HOSPADM

## 2018-06-25 RX ORDER — ACETAMINOPHEN 500 MG
TABLET ORAL
Status: COMPLETED
Start: 2018-06-25 | End: 2018-06-25

## 2018-06-25 RX ORDER — POLYETHYLENE GLYCOL 3350 17 G/17G
1 POWDER, FOR SOLUTION ORAL 2 TIMES DAILY PRN
Status: DISCONTINUED | OUTPATIENT
Start: 2018-06-25 | End: 2018-06-28 | Stop reason: HOSPADM

## 2018-06-25 RX ORDER — ONDANSETRON 2 MG/ML
4 INJECTION INTRAMUSCULAR; INTRAVENOUS EVERY 4 HOURS PRN
Status: DISCONTINUED | OUTPATIENT
Start: 2018-06-25 | End: 2018-06-28 | Stop reason: HOSPADM

## 2018-06-25 RX ORDER — CYCLOBENZAPRINE HCL 10 MG
10 TABLET ORAL EVERY 8 HOURS PRN
Status: DISCONTINUED | OUTPATIENT
Start: 2018-06-25 | End: 2018-06-27

## 2018-06-25 RX ORDER — LIDOCAINE HYDROCHLORIDE 10 MG/ML
0.5 INJECTION, SOLUTION INFILTRATION; PERINEURAL
Status: ACTIVE | OUTPATIENT
Start: 2018-06-25 | End: 2018-06-26

## 2018-06-25 RX ORDER — OXYCODONE HCL 5 MG/5 ML
SOLUTION, ORAL ORAL
Status: COMPLETED
Start: 2018-06-25 | End: 2018-06-25

## 2018-06-25 RX ADMIN — DORZOLAMIDE HYDROCHLORIDE 1 DROP: 20 SOLUTION/ DROPS OPHTHALMIC at 22:58

## 2018-06-25 RX ADMIN — HALOPERIDOL LACTATE 1 MG: 5 INJECTION, SOLUTION INTRAMUSCULAR at 19:33

## 2018-06-25 RX ADMIN — POTASSIUM CHLORIDE AND SODIUM CHLORIDE: 900; 150 INJECTION, SOLUTION INTRAVENOUS at 22:38

## 2018-06-25 RX ADMIN — ANTACID TABLETS 500 MG: 500 TABLET, CHEWABLE ORAL at 22:38

## 2018-06-25 RX ADMIN — LIDOCAINE HYDROCHLORIDE 0.5 ML: 10 INJECTION, SOLUTION INFILTRATION; PERINEURAL at 13:51

## 2018-06-25 RX ADMIN — METHOCARBAMOL 750 MG: 750 TABLET, FILM COATED ORAL at 18:45

## 2018-06-25 RX ADMIN — CEFAZOLIN SODIUM 2 G: 2 INJECTION, SOLUTION INTRAVENOUS at 22:38

## 2018-06-25 RX ADMIN — STANDARDIZED SENNA CONCENTRATE AND DOCUSATE SODIUM 1 TABLET: 8.6; 5 TABLET, FILM COATED ORAL at 22:38

## 2018-06-25 RX ADMIN — METHOCARBAMOL 750 MG: 750 TABLET ORAL at 18:45

## 2018-06-25 RX ADMIN — DOCUSATE SODIUM 100 MG: 100 CAPSULE ORAL at 22:38

## 2018-06-25 RX ADMIN — SODIUM CHLORIDE, SODIUM LACTATE, POTASSIUM CHLORIDE, CALCIUM CHLORIDE: 600; 310; 30; 20 INJECTION, SOLUTION INTRAVENOUS at 13:54

## 2018-06-25 RX ADMIN — FENTANYL CITRATE 50 MCG: 50 INJECTION, SOLUTION INTRAMUSCULAR; INTRAVENOUS at 18:15

## 2018-06-25 RX ADMIN — LATANOPROST 1 DROP: 50 SOLUTION OPHTHALMIC at 22:58

## 2018-06-25 RX ADMIN — ACETAMINOPHEN 1000 MG: 500 TABLET, FILM COATED ORAL at 23:00

## 2018-06-25 RX ADMIN — OXYCODONE HYDROCHLORIDE 10 MG: 5 SOLUTION ORAL at 18:30

## 2018-06-25 RX ADMIN — FENTANYL CITRATE 50 MCG: 50 INJECTION, SOLUTION INTRAMUSCULAR; INTRAVENOUS at 18:30

## 2018-06-25 RX ADMIN — ACETAMINOPHEN 1000 MG: 500 TABLET, FILM COATED ORAL at 20:00

## 2018-06-25 RX ADMIN — FENTANYL CITRATE 50 MCG: 50 INJECTION, SOLUTION INTRAMUSCULAR; INTRAVENOUS at 18:55

## 2018-06-25 ASSESSMENT — PAIN SCALES - GENERAL
PAINLEVEL_OUTOF10: ASSUMED PAIN PRESENT
PAINLEVEL_OUTOF10: 8
PAINLEVEL_OUTOF10: 2
PAINLEVEL_OUTOF10: 6
PAINLEVEL_OUTOF10: ASSUMED PAIN PRESENT
PAINLEVEL_OUTOF10: 8
PAINLEVEL_OUTOF10: 8
PAINLEVEL_OUTOF10: 10
PAINLEVEL_OUTOF10: 10
PAINLEVEL_OUTOF10: 6

## 2018-06-25 ASSESSMENT — PATIENT HEALTH QUESTIONNAIRE - PHQ9
2. FEELING DOWN, DEPRESSED, IRRITABLE, OR HOPELESS: NOT AT ALL
SUM OF ALL RESPONSES TO PHQ9 QUESTIONS 1 AND 2: 0
1. LITTLE INTEREST OR PLEASURE IN DOING THINGS: NOT AT ALL

## 2018-06-25 NOTE — OR NURSING
Patient allergies and NPO status verified, home medication reconciliation completed and belongings secured. Patient verbalizes understanding of pain scale, expected course of stay and plan of care. Surgical site verified with patient. IV access established; call light within reach. No further needs at this time; hourly rounding.

## 2018-06-26 PROCEDURE — A9270 NON-COVERED ITEM OR SERVICE: HCPCS | Performed by: NURSE PRACTITIONER

## 2018-06-26 PROCEDURE — 97162 PT EVAL MOD COMPLEX 30 MIN: CPT

## 2018-06-26 PROCEDURE — 770001 HCHG ROOM/CARE - MED/SURG/GYN PRIV*

## 2018-06-26 PROCEDURE — 700111 HCHG RX REV CODE 636 W/ 250 OVERRIDE (IP): Performed by: NURSE PRACTITIONER

## 2018-06-26 PROCEDURE — G8979 MOBILITY GOAL STATUS: HCPCS | Mod: CJ

## 2018-06-26 PROCEDURE — 700112 HCHG RX REV CODE 229: Performed by: NURSE PRACTITIONER

## 2018-06-26 PROCEDURE — 700102 HCHG RX REV CODE 250 W/ 637 OVERRIDE(OP): Performed by: NURSE PRACTITIONER

## 2018-06-26 PROCEDURE — G8978 MOBILITY CURRENT STATUS: HCPCS | Mod: CL

## 2018-06-26 RX ADMIN — DOCUSATE SODIUM 100 MG: 100 CAPSULE ORAL at 20:38

## 2018-06-26 RX ADMIN — CEFAZOLIN SODIUM 2 G: 2 INJECTION, SOLUTION INTRAVENOUS at 08:15

## 2018-06-26 RX ADMIN — HYDROCHLOROTHIAZIDE 25 MG: 25 TABLET ORAL at 08:15

## 2018-06-26 RX ADMIN — OXYCODONE HYDROCHLORIDE 5 MG: 5 TABLET ORAL at 12:22

## 2018-06-26 RX ADMIN — OXYCODONE HYDROCHLORIDE 5 MG: 5 TABLET ORAL at 18:00

## 2018-06-26 RX ADMIN — ACETAMINOPHEN 1000 MG: 500 TABLET, FILM COATED ORAL at 05:20

## 2018-06-26 RX ADMIN — VITAMIN D, TAB 1000IU (100/BT) 5000 UNITS: 25 TAB at 08:15

## 2018-06-26 RX ADMIN — ACETAMINOPHEN 1000 MG: 500 TABLET, FILM COATED ORAL at 23:41

## 2018-06-26 RX ADMIN — DORZOLAMIDE HYDROCHLORIDE 1 DROP: 20 SOLUTION/ DROPS OPHTHALMIC at 08:17

## 2018-06-26 RX ADMIN — LATANOPROST 1 DROP: 50 SOLUTION OPHTHALMIC at 20:36

## 2018-06-26 RX ADMIN — ACETAMINOPHEN 1000 MG: 500 TABLET, FILM COATED ORAL at 12:21

## 2018-06-26 RX ADMIN — ANTACID TABLETS 500 MG: 500 TABLET, CHEWABLE ORAL at 20:38

## 2018-06-26 RX ADMIN — LEVOTHYROXINE SODIUM 75 MCG: 75 TABLET ORAL at 05:41

## 2018-06-26 RX ADMIN — DORZOLAMIDE HYDROCHLORIDE 1 DROP: 20 SOLUTION/ DROPS OPHTHALMIC at 20:36

## 2018-06-26 RX ADMIN — BENZOCAINE AND MENTHOL 1 LOZENGE: 15; 3.6 LOZENGE ORAL at 08:21

## 2018-06-26 RX ADMIN — LOSARTAN POTASSIUM 100 MG: 50 TABLET ORAL at 08:15

## 2018-06-26 RX ADMIN — DOCUSATE SODIUM 100 MG: 100 CAPSULE ORAL at 08:15

## 2018-06-26 RX ADMIN — OXYCODONE HYDROCHLORIDE 5 MG: 5 TABLET ORAL at 08:15

## 2018-06-26 RX ADMIN — STANDARDIZED SENNA CONCENTRATE AND DOCUSATE SODIUM 1 TABLET: 8.6; 5 TABLET, FILM COATED ORAL at 20:38

## 2018-06-26 RX ADMIN — ANTACID TABLETS 500 MG: 500 TABLET, CHEWABLE ORAL at 08:15

## 2018-06-26 RX ADMIN — OXYCODONE HYDROCHLORIDE 5 MG: 5 TABLET ORAL at 02:56

## 2018-06-26 ASSESSMENT — COGNITIVE AND FUNCTIONAL STATUS - GENERAL
TURNING FROM BACK TO SIDE WHILE IN FLAT BAD: UNABLE
SUGGESTED CMS G CODE MODIFIER MOBILITY: CL
MOBILITY SCORE: 13
WALKING IN HOSPITAL ROOM: A LITTLE
MOVING FROM LYING ON BACK TO SITTING ON SIDE OF FLAT BED: A LITTLE
MOVING TO AND FROM BED TO CHAIR: UNABLE
STANDING UP FROM CHAIR USING ARMS: A LITTLE
CLIMB 3 TO 5 STEPS WITH RAILING: A LOT

## 2018-06-26 ASSESSMENT — PAIN SCALES - GENERAL
PAINLEVEL_OUTOF10: 2
PAINLEVEL_OUTOF10: 2
PAINLEVEL_OUTOF10: 5
PAINLEVEL_OUTOF10: 5

## 2018-06-26 ASSESSMENT — PAIN SCALES - WONG BAKER
WONGBAKER_NUMERICALRESPONSE: HURTS A LITTLE MORE
WONGBAKER_NUMERICALRESPONSE: HURTS JUST A LITTLE BIT
WONGBAKER_NUMERICALRESPONSE: HURTS EVEN MORE
WONGBAKER_NUMERICALRESPONSE: HURTS A LITTLE MORE
WONGBAKER_NUMERICALRESPONSE: HURTS JUST A LITTLE BIT

## 2018-06-26 ASSESSMENT — GAIT ASSESSMENTS
ASSISTIVE DEVICE: 4 WHEEL WALKER
GAIT LEVEL OF ASSIST: STAND BY ASSIST
DEVIATION: BRADYKINETIC;SHUFFLED GAIT
DISTANCE (FEET): 150

## 2018-06-26 ASSESSMENT — LIFESTYLE VARIABLES: ALCOHOL_USE: NO

## 2018-06-26 NOTE — FACE TO FACE
Face to Face Supporting Documentation - Home Health    The encounter with this patient was in whole or in part the primary reason for home health admission.    Date of encounter:   Patient:                    MRN:                       YOB: 2016  Debra Stockton  [id number]  1940     Home health to see patient for:  Skilled Nursing care for assessment, interventions & education and Physical Therapy evaluation and treatment    Skilled need for:  Comment: s/p cervical discectomy/ fusion / corpectomy     Skilled nursing interventions to include:  Comment: help with ambulation, monitoring incision, etc    Homebound status evidenced by:  Need the aid of supportive devices such as crutches, canes, wheelchairs or walkers. Leaving home requires a considerable and taxing effort. There is a normal inability to leave the home.    Community Physician to provide follow up care: Herson Garcia M.D.     Optional Interventions? No      I certify the face to face encounter for this home health care referral meets the CMS requirements and the encounter/clinical assessment with the patient was, in whole, or in part, for the medical condition(s) listed above, which is the primary reason for home health care. Based on my clinical findings: the service(s) are medically necessary, support the need for home health care, and the homebound criteria are met.  I certify that this patient has had a face to face encounter by myself.  GLENN Salmeron. - NPI: [id number]

## 2018-06-26 NOTE — CARE PLAN
Problem: Safety  Goal: Will remain free from injury  Patient is alert and oriented x 4, able to use call light appropriately. Safety education provided, verbalized understanding.     Problem: Pain Management  Goal: Pain level will decrease to patient's comfort goal  Pain is controlled with current pain management. Will continue to monitor for any side effects.

## 2018-06-26 NOTE — OR NURSING
Pt A&OX4. VSS. Pt on 2 L of oxygen via nasal cannula. Anterior neck surgical incision closed with Dermabond, CDI. Hemovac in place and compressed to suction with sanguinous drainage out, dressing CDI. Aspen cervical collar placed by ortho tech. Pt's strength to BUE and BLE 4/5. Pt states some numbness to LLE. Pt states pain 6/10 post pain medication administration and PO Robaxin. Slight nausea resolved post IV Haldol per anesthesia order set. Report called to [facility] Freed. Room being cleaned, awaiting ready bed.

## 2018-06-26 NOTE — DISCHARGE PLANNING
Current documentation reveals a potential for acute inpatient rehabilitation, pending TX evals/D/C resources/support.  Please consider a PMR referral to assist with discharge planning.

## 2018-06-26 NOTE — OR SURGEON
Immediate Post OP Note    PreOp Diagnosis: cervical myelopathy     PostOp Diagnosis: cervical myelopathy     Procedure(s):  CERVICAL DISK AND FUSION ANTERIOR/ C4-7 - Wound Class: Clean with Drain  CORPECTOMY/ C5 - Wound Class: Clean with Drain    Surgeon(s):  Lester Doyle M.D.    Anesthesiologist/Type of Anesthesia:  Anesthesiologist: Alexis Batista M.D./General    Surgical Staff:  Assistant: ALYSA Salmeron  Circulator: Alfred Zamora R.N.  Relief Circulator: Gabriela Ragsdale R.N.  Relief Scrub: Carol Hardwick  Scrub Person: Nova Pressley  Radiology Technologist: Lary Rivera    Specimens removed if any:  * No specimens in log *    Estimated Blood Loss: 50 cc    Findings: good decompression, good hardware placement     Complications: none        6/25/2018 5:29 PM ALYSA Salmeron

## 2018-06-26 NOTE — PROGRESS NOTES
Patient was transferred to the unit via gurney. Reports nausea, slide board used for transfer. Patient is Sami speaking with little English for communication. Staff whose speaks Sami was at bedside for assessments. Patient has reported slight headache and pain on surgical area. PRN given. See MAR. Patient ambulated to the bathroom using FWW x 1 assist. Hemovac in place, self-compressed. Patient has neuro deficits from previous craniotomy has left lower and upper facial droop. 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 On 2 LPM of O2 at 97%. IS use encouraged. Bed alarm in place, safety education provided. Able to use call light appropriately, within reach. Will continue to monitor.

## 2018-06-26 NOTE — OP REPORT
DATE OF SERVICE:  06/26/2018    PREOPERATIVE DIAGNOSIS:  Cervical spondylitic myelopathy.    POSTOPERATIVE DIAGNOSIS:  Cervical spondylitic myelopathy.    PROCEDURES PERFORMED:  1.  Anterior cervical 4-5, 5-6, 6-7 discectomy.  2.  Interbody arthrodesis cervical 4-5, 5-6 and 6-7.  3.  Cervical 5 corpectomy.  4.  Implantation of Globus expandable interbody cage from cervical 4 through   6.  5.  Implantation of Globus PEEK interbody cage at cervical 6-7.  6.  Anterior cervical plating cervical 4 through 7 using Globus static plate   and 14 mm self-drilling, self-tapping screws.  7.  Bilateral neural foramina cervical 5, 6 and 7 roots.  8.  Open reduction of kyphotic deformity at cervical 4 through 7.  9.  Use of operative microscope for microdissection.  10.  Use of intraoperative neuro monitoring.    SURGEON:  Lester Doyle MD    ASSISTANT:  HUI Salazar    ANESTHESIA:  General.    COMPLICATIONS:  None.    ESTIMATED BLOOD LOSS:  50 mL.    DESCRIPTION OF PROCEDURE:  The patient was brought to the operating room,   identified in the usual fashion.  General endotracheal anesthesia was induced   by the anesthesia team.  The patient was then placed supine on the operating   room table.  All pressure points were meticulously padded.  Transverse   incision was marked in the skin using fluoroscopic guidance.  We had good   neuro monitoring baselines.  The patient was then prepped and draped in the   usual sterile fashion.  Local anesthesia was infiltrated in the subcutaneous   tissue.  A 15-blade was used to incise the skin.  Dissection was carried down   through platysma using Bovie electrocautery.  Retractors were put in place.    We then undermined platysma using sharp dissection along with sharp dissection   to identify the medial border of the sternocleidomastoid muscle.  We then   used blunt dissection to access the anterior spine.  Spinal needle was placed   at what we believed to be cervical 6-7.   Film was taken confirming that we   were at the correct level.  This was then marked with a Bovie.  We then   proceeded to clean off cervical 4-5, 5-6 and 6-7 using Bovie electrocautery   and reflecting longus colli muscles laterally bilaterally.  We then placed    retractors at the level of cervical 5 spanning cervical 4-5 and   5-6.  We then placed retractors superiorly and inferiorly and then brought in   the operative microscope for the diskectomies and corpectomy.  A 15 blade was   used to incise the disk spaces at cervical 4-5 and 5-6.  We then removed disk   contents with an alley and pituitary and upgoing curette.  We then used a   combination of Leksell rongeur, a small Leksell and a high speed air drill to   perform a corpectomy of cervical 5.  We checked with lollipop on the Globus   sizer to confirm we had excellent width of the corpectomy for the cage.  We   then used a Kerrison 1 and 2 punches to remove the posterior longitudinal   ligament and completely decompressed the central canal.  There were no changes   to neuro monitoring through all of this, so we performed foraminotomies at   cervical 5 and 6 roots bilaterally.  FloSeal with gentle tamponade was used   for hemostasis.  The appropriately sized cage was filled with autograft and   inserted the disk space and gently countersunk using a tamp.  Film was taken   confirming that we were at the correct levels and that the cage looked to be   in excellent position.  We then removed all retractors and then readjusted   them for cervical 6-7.  We performed a diskectomy at 6-7 using the same   sequence of events as listed above.  Posterior longitudinal ligament was   removed using Kerrison 1 and 2 punches as well as foraminotomies as well.    With appropriately sized cage was then filled with locally harvested   morcellized autograft and allograft and inserted into the disk space and   gently countersunk using a tamp.  Film was taken  confirming that the hardware   looked to be in excellent position.  We used the appropriately sized plate   with 14 mm self-drilling, self-tapping screws.  All screws had excellent   purchase, were final tightened and Cam locked to the plate.  We then removed   all retractors and confirmed film showed everything to be in excellent   position and the correct levels.  We then used bipolar electrocautery for   hemostasis on longus colli and then closed the incision in layers after   leaving a drain in.  Incision was topped with Dermabond.  All sponge and   needle counts were correct x2 at the end the case.  I was present and scrubbed   for the entire procedure.  The patient was awakened and was transferred to   the recovery room in stable condition.       ____________________________________     LIAN BAILEY MD    CPD / NTS    DD:  06/26/2018 16:09:51  DT:  06/26/2018 16:21:43    D#:  [id number]  Job#:  [id number]

## 2018-06-26 NOTE — PROGRESS NOTES
Fitted patient with aspen cervical collar  Any questions or concerns please call traction at [phone]

## 2018-06-26 NOTE — PROGRESS NOTES
Family called to speak with RN this AM, unit clerk gave this RN number- number did not work. Family then in to see pt this afternoon- all questions answered. Contact numbers updated in EPIC. Dario Mistry requesting APN to call her and  also requesting home health. Paged Cait AMES. Number provided to her for dario. APN will also place order for home health.

## 2018-06-26 NOTE — PROGRESS NOTES
Neurosurgery Progress Note    Subjective:  Pt awake, alert  [place] speaking   c/o neck pain/ shoulder pain, no radicular pain      Exam:  UE str 5/5   Sens equal to light touch  Inc c/d/I with dermabond/ hvac out.     BP  Min: 149/78  Max: 159/67  Pulse  Av.8  Min: 74  Max: 96  Resp  Av.1  Min: 13  Max: 21  Temp  Av.3 °C (97.4 °F)  Min: 36.1 °C (97 °F)  Max: 37.2 °C (98.9 °F)  SpO2  Av.5 %  Min: 93 %  Max: 99 %    No Data Recorded                      Intake/Output       18 - 18 - 1859      [id number] Total 0291-5921 5682-8567 Total       Intake    P.O.  --  120 120  --  -- --    P.O. -- 120 120 -- -- --    I.V.  1400  300 1700  --  -- --    Crystalloid Intake 8786 400 4900 -- -- --    Other  --  10 10  --  -- --    Medications (P.O./ Enteral Liquids) -- 10 10 -- -- --    Total Intake 7276 949 8859 -- -- --       Output    Drains  --  25 25  --  -- --    Output (ml) ([facility] Neck Hemovac 1 (A)) -- 25 25 -- -- --    Blood  50  -- 50  --  -- --    Est. Blood Loss (mL) 50 -- 50 -- -- --    Total Output 50 25 75 -- -- --       Net I/O     9246 802 7491 -- -- --            Intake/Output Summary (Last 24 hours) at 18 0920  Last data filed at 18 0400   Gross per 24 hour   Intake             1830 ml   Output               75 ml   Net             1755 ml            • lidocaine  0.5 mL Once PRN   • LR   Continuous   • dorzolamide  1 Drop BID   • levothyroxine  75 mcg AM ES   • meclizine  25 mg TID PRN   • Pharmacy Consult Request  1 Each PRN   • MD ALERT...Do not administer NSAIDS or ASPIRIN unless ORDERED By Neurosurgery  1 Each PRN   • docusate sodium  100 mg BID   • senna-docusate  1 Tab Nightly   • senna-docusate  1 Tab Q24HRS PRN   • polyethylene glycol/lytes  1 Packet BID PRN   • magnesium hydroxide  30 mL QDAY PRN   • bisacodyl  10 mg Q24HRS PRN   • fleet  1 Each Once PRN   • 0.9 % NaCl with KCl 20 mEq 1,000 mL    Continuous   • acetaminophen  1,000 mg Q6HRS   • oxyCODONE immediate-release  5 mg Q3HRS PRN   • oxyCODONE immediate release  10 mg Q3HRS PRN   • morphine injection  4 mg Q3HRS PRN   • diphenhydrAMINE  25 mg Q6HRS PRN    Or   • diphenhydrAMINE  25 mg Q6HRS PRN   • ondansetron  4 mg Q4HRS PRN   • ondansetron  4 mg Q4HRS PRN   • methocarbamol  750 mg Q8HRS PRN    Or   • cyclobenzaprine  10 mg Q8HRS PRN    Or   • diazePAM  5 mg Q4HRS PRN   • cloNIDine  0.1 mg Q4HRS PRN   • hydrALAZINE  10 mg Q HOUR PRN   • labetalol  10 mg Q HOUR PRN   • benzocaine-menthol  1 Lozenge Q2HRS PRN   • calcium carbonate  500 mg BID   • vitamin D  5,000 Units DAILY   • latanoprost  1 Drop Q EVENING   • losartan  100 mg Q DAY    And   • hydroCHLOROthiazide  25 mg Q DAY       Assessment and Plan:  POD # 1 Anterior cervical discectomy/fusion 4-7, liseth C5  Prophylactic anticoagulation: no           PT/OT - c-collar when OOB  Pain control  ? Home tomorrow     ATTENDING ADDENDUM:  Patient seen independently and agree with above note

## 2018-06-26 NOTE — CARE PLAN
Problem: Safety  Goal: Will remain free from falls    Intervention: Implement fall precautions  Call light w/in reach. Instructed pt to call for assistance before getting OOB- pt verbalized understanding.        Problem: Pain Management  Goal: Pain level will decrease to patient's comfort goal    Intervention: Follow pain managment plan developed in collaboration with patient and Interdisciplinary Team  Oxy given PRN with +results. Educated pt on importance of pain control- pt verbalized understanding.

## 2018-06-27 PROCEDURE — G8988 SELF CARE GOAL STATUS: HCPCS | Mod: CI

## 2018-06-27 PROCEDURE — 97116 GAIT TRAINING THERAPY: CPT

## 2018-06-27 PROCEDURE — G8987 SELF CARE CURRENT STATUS: HCPCS | Mod: CI

## 2018-06-27 PROCEDURE — A9270 NON-COVERED ITEM OR SERVICE: HCPCS | Performed by: NURSE PRACTITIONER

## 2018-06-27 PROCEDURE — 700112 HCHG RX REV CODE 229: Performed by: NURSE PRACTITIONER

## 2018-06-27 PROCEDURE — 700102 HCHG RX REV CODE 250 W/ 637 OVERRIDE(OP): Performed by: NURSE PRACTITIONER

## 2018-06-27 PROCEDURE — 97165 OT EVAL LOW COMPLEX 30 MIN: CPT

## 2018-06-27 PROCEDURE — 770001 HCHG ROOM/CARE - MED/SURG/GYN PRIV*

## 2018-06-27 RX ORDER — DIAZEPAM 2 MG/1
5 TABLET ORAL EVERY 4 HOURS PRN
Status: DISCONTINUED | OUTPATIENT
Start: 2018-06-27 | End: 2018-06-28 | Stop reason: HOSPADM

## 2018-06-27 RX ORDER — METHOCARBAMOL 750 MG/1
750 TABLET, FILM COATED ORAL EVERY 8 HOURS PRN
Status: DISCONTINUED | OUTPATIENT
Start: 2018-06-27 | End: 2018-06-28 | Stop reason: HOSPADM

## 2018-06-27 RX ORDER — NYSTATIN 100000 U/G
CREAM TOPICAL 3 TIMES DAILY PRN
Status: DISCONTINUED | OUTPATIENT
Start: 2018-06-27 | End: 2018-06-28 | Stop reason: HOSPADM

## 2018-06-27 RX ORDER — PSEUDOEPHEDRINE HCL 30 MG
100 TABLET ORAL 2 TIMES DAILY
Qty: 60 CAP | COMMUNITY
Start: 2018-06-27 | End: 2021-06-05

## 2018-06-27 RX ORDER — CYCLOBENZAPRINE HCL 5 MG
5 TABLET ORAL 3 TIMES DAILY PRN
Qty: 30 TAB | Refills: 0 | Status: SHIPPED | OUTPATIENT
Start: 2018-06-27 | End: 2021-06-05

## 2018-06-27 RX ORDER — OXYCODONE HYDROCHLORIDE AND ACETAMINOPHEN 5; 325 MG/1; MG/1
1 TABLET ORAL EVERY 6 HOURS PRN
Qty: 28 TAB | Refills: 0 | Status: SHIPPED | OUTPATIENT
Start: 2018-06-27 | End: 2018-07-04

## 2018-06-27 RX ORDER — CYCLOBENZAPRINE HCL 10 MG
5 TABLET ORAL EVERY 8 HOURS PRN
Status: DISCONTINUED | OUTPATIENT
Start: 2018-06-27 | End: 2018-06-28 | Stop reason: HOSPADM

## 2018-06-27 RX ADMIN — LATANOPROST 1 DROP: 50 SOLUTION OPHTHALMIC at 21:49

## 2018-06-27 RX ADMIN — LEVOTHYROXINE SODIUM 75 MCG: 75 TABLET ORAL at 05:24

## 2018-06-27 RX ADMIN — STANDARDIZED SENNA CONCENTRATE AND DOCUSATE SODIUM 1 TABLET: 8.6; 5 TABLET, FILM COATED ORAL at 21:47

## 2018-06-27 RX ADMIN — DORZOLAMIDE HYDROCHLORIDE 1 DROP: 20 SOLUTION/ DROPS OPHTHALMIC at 07:56

## 2018-06-27 RX ADMIN — ANTACID TABLETS 500 MG: 500 TABLET, CHEWABLE ORAL at 21:46

## 2018-06-27 RX ADMIN — DORZOLAMIDE HYDROCHLORIDE 1 DROP: 20 SOLUTION/ DROPS OPHTHALMIC at 21:49

## 2018-06-27 RX ADMIN — LOSARTAN POTASSIUM 100 MG: 50 TABLET ORAL at 07:54

## 2018-06-27 RX ADMIN — DOCUSATE SODIUM 100 MG: 100 CAPSULE ORAL at 21:47

## 2018-06-27 RX ADMIN — OXYCODONE HYDROCHLORIDE 5 MG: 5 TABLET ORAL at 18:26

## 2018-06-27 RX ADMIN — VITAMIN D, TAB 1000IU (100/BT) 5000 UNITS: 25 TAB at 07:52

## 2018-06-27 RX ADMIN — ACETAMINOPHEN 1000 MG: 500 TABLET, FILM COATED ORAL at 18:25

## 2018-06-27 RX ADMIN — NYSTATIN: 100000 CREAM TOPICAL at 18:25

## 2018-06-27 RX ADMIN — ANTACID TABLETS 500 MG: 500 TABLET, CHEWABLE ORAL at 07:52

## 2018-06-27 RX ADMIN — ACETAMINOPHEN 1000 MG: 500 TABLET, FILM COATED ORAL at 05:24

## 2018-06-27 RX ADMIN — DOCUSATE SODIUM 100 MG: 100 CAPSULE ORAL at 07:54

## 2018-06-27 RX ADMIN — HYDROCHLOROTHIAZIDE 25 MG: 25 TABLET ORAL at 07:54

## 2018-06-27 RX ADMIN — ACETAMINOPHEN 1000 MG: 500 TABLET, FILM COATED ORAL at 13:17

## 2018-06-27 RX ADMIN — OXYCODONE HYDROCHLORIDE 5 MG: 5 TABLET ORAL at 08:09

## 2018-06-27 RX ADMIN — OXYCODONE HYDROCHLORIDE 5 MG: 5 TABLET ORAL at 21:47

## 2018-06-27 RX ADMIN — OXYCODONE HYDROCHLORIDE 10 MG: 10 TABLET ORAL at 02:22

## 2018-06-27 ASSESSMENT — PAIN SCALES - GENERAL
PAINLEVEL_OUTOF10: 1
PAINLEVEL_OUTOF10: 5
PAINLEVEL_OUTOF10: 2
PAINLEVEL_OUTOF10: 2
PAINLEVEL_OUTOF10: 5
PAINLEVEL_OUTOF10: 4

## 2018-06-27 ASSESSMENT — GAIT ASSESSMENTS
ASSISTIVE DEVICE: 4 WHEEL WALKER
GAIT LEVEL OF ASSIST: CONTACT GUARD ASSIST
DISTANCE (FEET): 75
DEVIATION: BRADYKINETIC;SHUFFLED GAIT

## 2018-06-27 ASSESSMENT — COGNITIVE AND FUNCTIONAL STATUS - GENERAL
SUGGESTED CMS G CODE MODIFIER DAILY ACTIVITY: CJ
HELP NEEDED FOR BATHING: A LITTLE
DAILY ACTIVITIY SCORE: 21
MOBILITY SCORE: 15
STANDING UP FROM CHAIR USING ARMS: A LITTLE
SUGGESTED CMS G CODE MODIFIER MOBILITY: CK
MOVING FROM LYING ON BACK TO SITTING ON SIDE OF FLAT BED: A LITTLE
DRESSING REGULAR UPPER BODY CLOTHING: A LITTLE
MOVING TO AND FROM BED TO CHAIR: UNABLE
DRESSING REGULAR LOWER BODY CLOTHING: A LITTLE
TURNING FROM BACK TO SIDE WHILE IN FLAT BAD: A LITTLE
WALKING IN HOSPITAL ROOM: A LITTLE
CLIMB 3 TO 5 STEPS WITH RAILING: A LOT

## 2018-06-27 ASSESSMENT — ACTIVITIES OF DAILY LIVING (ADL): TOILETING: INDEPENDENT

## 2018-06-27 NOTE — THERAPY
"Pt w/impaired balance, gait, and activity tolerance. Pt demonstrated decreased heel-toe and distance limtied by pt, 2' pain and dizziness. Pt would benefit from further acute PT txs to progress towards goals and independence. Pt would benefit from post acute placement to address deficits, as it is uncertain the level of assistance that can be provided at home, per family memeber at beside during tx today. Anticipate if support can be provided and pain is controlled, pt mobility will improve. Will cont to follow.    Physical Therapy Treatment completed.   Bed Mobility:  Supine to Sit:  (NT--in chair pre session)  Transfers: Sit to Stand: Stand by Assist  Gait: Level Of Assist: Contact Guard Assist with 4-Wheel Walker       Plan of Care: Will benefit from Physical Therapy 5 times per week    See \"Rehab Therapy-Acute\" Patient Summary Report for complete documentation.       "

## 2018-06-27 NOTE — THERAPY
"Occupational Therapy Evaluation completed.   Functional Status:    Sup>sit: Min A (HHA) and HOB elevated  Sit><stand: SBA  Toilet xfer: SBA  Standing H/G: SBA  Amb in room with 4WW    Plan of Care: Plan to complete next treatment by 6/28  Discharge Recommendations:  Equipment: Will Continue to Assess for Equipment Needs. Post-acute therapy Discharge to home with outpatient or home health for additional skilled therapy services.    See \"Rehab Therapy-Acute\" Patient Summary Report for complete documentation.    Pleasant 79 y/o female s/p C4-7 diskectomy and fusion. PMHx significant for previus craniotomy. Pt in blind in left eye. Pt cooperative with therapy today, demonstrated functional mobility and ADL near baseline, however did have limited activity tolerance 2/2 dizziness. Dr. Doyle visited during treatment and confirms that the plan is to return home tomorrow. Although pt lives alone, she reports that her sisters will take turns staying with her as long as needed and she will be going home with home health. Pt will benefit from HH OT to maximize safety and independence at home, acute OT to continue working with pt 1-2 more times while in house to improve activity tolerance and improve independence with functional mobility.    "

## 2018-06-27 NOTE — OP REPORT
DATE OF SERVICE:  06/25/2018    PREOPERATIVE DIAGNOSIS:  Cervical spondylitic myelopathy.    POSTOPERATIVE DIAGNOSIS:  Cervical spondylitic myelopathy.    PROCEDURES PERFORMED:  1.  Anterior cervical 4-5, 5-6, 6-7 discectomy.  2.  Interbody arthrodesis cervical 4-5, 5-6 and 6-7.  3.  Cervical 5 corpectomy.  4.  Implantation of Globus expandable interbody cage from cervical 4 through   6.  5.  Implantation of Globus PEEK interbody cage at cervical 6-7.  6.  Anterior cervical plating cervical 4 through 7 using Globus static plate   and 14 mm self-drilling, self-tapping screws.  7.  Bilateral neural foramina cervical 5, 6 and 7 roots.  8.  Open reduction of kyphotic deformity at cervical 4 through 7.  9.  Use of operative microscope for microdissection.  10.  Use of intraoperative neuro monitoring.    SURGEON:  Lester Doyle MD    ASSISTANT:  HUI Salazar    ANESTHESIA:  General.    COMPLICATIONS:  None.    ESTIMATED BLOOD LOSS:  50 mL.    DESCRIPTION OF PROCEDURE:  The patient was brought to the operating room,   identified in the usual fashion.  General endotracheal anesthesia was induced   by the anesthesia team.  The patient was then placed supine on the operating   room table.  All pressure points were meticulously padded.  Transverse   incision was marked in the skin using fluoroscopic guidance.  We had good   neuro monitoring baselines.  The patient was then prepped and draped in the   usual sterile fashion.  Local anesthesia was infiltrated in the subcutaneous   tissue.  A 15-blade was used to incise the skin.  Dissection was carried down   through platysma using Bovie electrocautery.  Retractors were put in place.    We then undermined platysma using sharp dissection along with sharp dissection   to identify the medial border of the sternocleidomastoid muscle.  We then   used blunt dissection to access the anterior spine.  Spinal needle was placed   at what we believed to be cervical 6-7.   Film was taken confirming that we   were at the correct level.  This was then marked with a Bovie.  We then   proceeded to clean off cervical 4-5, 5-6 and 6-7 using Bovie electrocautery   and reflecting longus colli muscles laterally bilaterally.  We then placed    retractors at the level of cervical 5 spanning cervical 4-5 and   5-6.  We then placed retractors superiorly and inferiorly and then brought in   the operative microscope for the diskectomies and corpectomy.  A 15 blade was   used to incise the disk spaces at cervical 4-5 and 5-6.  We then removed disk   contents with an alley and pituitary and upgoing curette.  We then used a   combination of Leksell rongeur, a small Leksell and a high speed air drill to   perform a corpectomy of cervical 5.  We checked with lollipop on the Globus   sizer to confirm we had excellent width of the corpectomy for the cage.  We   then used a Kerrison 1 and 2 punches to remove the posterior longitudinal   ligament and completely decompressed the central canal.  There were no changes   to neuro monitoring through all of this, so we performed foraminotomies at   cervical 5 and 6 roots bilaterally.  FloSeal with gentle tamponade was used   for hemostasis.  The appropriately sized cage was filled with autograft and   inserted the disk space and gently countersunk using a tamp.  Film was taken   confirming that we were at the correct levels and that the cage looked to be   in excellent position.  We then removed all retractors and then readjusted   them for cervical 6-7.  We performed a diskectomy at 6-7 using the same   sequence of events as listed above.  Posterior longitudinal ligament was   removed using Kerrison 1 and 2 punches as well as foraminotomies as well.    With appropriately sized cage was then filled with locally harvested   morcellized autograft and allograft and inserted into the disk space and   gently countersunk using a tamp.  Film was taken  confirming that the hardware   looked to be in excellent position.  We used the appropriately sized plate   with 14 mm self-drilling, self-tapping screws.  All screws had excellent   purchase, were final tightened and Cam locked to the plate.  We then removed   all retractors and confirmed film showed everything to be in excellent   position and the correct levels.  We then used bipolar electrocautery for   hemostasis on longus colli and then closed the incision in layers after   leaving a drain in.  Incision was topped with Dermabond.  All sponge and   needle counts were correct x2 at the end the case.  I was present and scrubbed   for the entire procedure.  The patient was awakened and was transferred to   the recovery room in stable condition.       ____________________________________     LIAN BAILEY MD    CPD / NTS    DD:  06/26/2018 16:09:51  DT:  06/26/2018 16:21:43    D#:  [id number]  Job#:  [id number]

## 2018-06-27 NOTE — DISCHARGE PLANNING
Anticipated Discharge Disposition:   Home with HH    Action:   Met with pt. Choice made for [facility]. Faxed to [facility].  Family member at the bedside.     Barriers to Discharge:   Pending HH acceptance  Medical clearance    Plan:  Follow up with [facility].

## 2018-06-27 NOTE — PROGRESS NOTES
Assumed pt care at 1900.  Received report from day RN.  Assessment completed.  Pt AOx4.  Pt comlaints of pain at this time in neck 2/10 , continues with pain management as ordered.  No other s/s of discomfort or distress. Pt ambulating with staff when needed. Skin intact/ slightly red, surgical incision NATALIIA. Bed in lowest position, locked, and bed alarm on . Pt educated on safety and POC, states understanding.  Pt calls appropriately.  Treaded socks in place, SCDs refused,  call light within reach and staff numbers provided.  Pt needs met at this time.

## 2018-06-27 NOTE — CARE PLAN
Problem: Discharge Barriers/Planning  Goal: Patient's continuum of care needs will be met  Outcome: PROGRESSING AS EXPECTED  Pt requires home health set up before discharge    Problem: Mobility  Goal: Risk for activity intolerance will decrease  Outcome: PROGRESSING AS EXPECTED  Pt mobilizing well by self with 4WW

## 2018-06-27 NOTE — PROGRESS NOTES
Pt denies numbness or tingling. Some pain in neck. Given PRN oxycodone 5mg. Pt mobilizes well in the room with 4WW, stand by assist. Pt has some difficulty swallowing but manages full liquid diet. Voiding fine, passing gas. Fellow nurse  used for assessment. Home health choice will be done today, home tomorrow.

## 2018-06-27 NOTE — PROGRESS NOTES
Neurosurgery Progress Note    Subjective:  Pt awake, alert  [facility] speaking - used   Pain controlled, has some pain dorsal left foot  No arm pain or n/t  Swallowing fine - sore  Voiding  No bm yet  Does not feel ready to dc today      Exam:  UE str 5/5   Sens equal to light touch  Inc c/d/I with dermabond      BP  Min: 121/49  Max: 166/77  Pulse  Av.2  Min: 85  Max: 95  Resp  Av  Min: 16  Max: 16  Temp  Av.7 °C (98.1 °F)  Min: 36.5 °C (97.7 °F)  Max: 37.1 °C (98.8 °F)  SpO2  Av.4 %  Min: 91 %  Max: 98 %    No Data Recorded                      Intake/Output       18 07 - 18 0659 18 07 - 18 0659      1140-9667 7719-2490 Total 3033-8028 6189-7411 Total       Intake    P.O.  1500  240 1740  --  -- --    P.O. 8380 936 2955 -- -- --    Total Intake 8601 210 8582 -- -- --       Output    Urine  --  -- --  --  -- --    Number of Times Voided 3 x 1 x 4 x -- -- --    Total Output -- -- -- -- -- --       Net I/O     5962 216 8249 -- -- --            Intake/Output Summary (Last 24 hours) at 18 0908  Last data filed at 18   Gross per 24 hour   Intake             1240 ml   Output                0 ml   Net             1240 ml            • LR   Continuous   • dorzolamide  1 Drop BID   • levothyroxine  75 mcg AM ES   • meclizine  25 mg TID PRN   • Pharmacy Consult Request  1 Each PRN   • MD ALERT...Do not administer NSAIDS or ASPIRIN unless ORDERED By Neurosurgery  1 Each PRN   • docusate sodium  100 mg BID   • senna-docusate  1 Tab Nightly   • senna-docusate  1 Tab Q24HRS PRN   • polyethylene glycol/lytes  1 Packet BID PRN   • magnesium hydroxide  30 mL QDAY PRN   • bisacodyl  10 mg Q24HRS PRN   • fleet  1 Each Once PRN   • 0.9 % NaCl with KCl 20 mEq 1,000 mL   Continuous   • acetaminophen  1,000 mg Q6HRS   • oxyCODONE immediate-release  5 mg Q3HRS PRN   • oxyCODONE immediate release  10 mg Q3HRS PRN   • morphine injection  4 mg Q3HRS PRN   •  diphenhydrAMINE  25 mg Q6HRS PRN    Or   • diphenhydrAMINE  25 mg Q6HRS PRN   • ondansetron  4 mg Q4HRS PRN   • ondansetron  4 mg Q4HRS PRN   • methocarbamol  750 mg Q8HRS PRN    Or   • cyclobenzaprine  10 mg Q8HRS PRN    Or   • diazePAM  5 mg Q4HRS PRN   • cloNIDine  0.1 mg Q4HRS PRN   • hydrALAZINE  10 mg Q HOUR PRN   • labetalol  10 mg Q HOUR PRN   • benzocaine-menthol  1 Lozenge Q2HRS PRN   • calcium carbonate  500 mg BID   • vitamin D  5,000 Units DAILY   • latanoprost  1 Drop Q EVENING   • losartan  100 mg Q DAY    And   • hydroCHLOROthiazide  25 mg Q DAY       Assessment and Plan:  POD # 2 Anterior cervical discectomy/fusion 4-7, liseth C5  Prophylactic anticoagulation: no           PT/OT - c-collar when OOB  Pain control  Bowel/ bladder protocol  Home tomorrow with home health pt/nursing    ATTENDING ADDENDUM:  Patient seen independently and agree with above note

## 2018-06-27 NOTE — THERAPY
"Physical Therapy Evaluation completed.   Bed Mobility:  Supine to Sit: Minimal Assist (flat HOB; no railing )  Transfers: Sit to Stand: Stand by Assist (with 4WW)  Gait: Level Of Assist: Stand by Assist with 4-Wheel Walker       Plan of Care: Will benefit from Physical Therapy 5 times per week  Discharge Recommendations: Equipment: Will Continue to Assess for Equipment Needs.=    Pt presents with impaired activity tolerance and pain s/p C4-7 ACDF. Pt is most limited by pt apprehension, feels helpless and unable to care for self at home, reporting she has no friends or family assist though 4 family members have visited today and bedside RN reporting she confirmed support via telephone . Pt does need assist for bed mobility and has probable more OT needs than PT. If pt truly does not have support, she would need a short stay placement to progress to independent bed mobility and ADLs. If family able to provide 24/7 support then agree with home with home health. Will follow.     See \"Rehab Therapy-Acute\" Patient Summary Report for complete documentation.     "

## 2018-06-28 VITALS
HEIGHT: 58 IN | BODY MASS INDEX: 25.82 KG/M2 | RESPIRATION RATE: 16 BRPM | DIASTOLIC BLOOD PRESSURE: 68 MMHG | SYSTOLIC BLOOD PRESSURE: 148 MMHG | OXYGEN SATURATION: 94 % | TEMPERATURE: 98.5 F | HEART RATE: 95 BPM | WEIGHT: 123.02 LBS

## 2018-06-28 PROCEDURE — A9270 NON-COVERED ITEM OR SERVICE: HCPCS | Performed by: NURSE PRACTITIONER

## 2018-06-28 PROCEDURE — 97116 GAIT TRAINING THERAPY: CPT

## 2018-06-28 PROCEDURE — 700111 HCHG RX REV CODE 636 W/ 250 OVERRIDE (IP): Performed by: NURSE PRACTITIONER

## 2018-06-28 PROCEDURE — 700112 HCHG RX REV CODE 229: Performed by: NURSE PRACTITIONER

## 2018-06-28 PROCEDURE — 700102 HCHG RX REV CODE 250 W/ 637 OVERRIDE(OP): Performed by: NURSE PRACTITIONER

## 2018-06-28 PROCEDURE — 97535 SELF CARE MNGMENT TRAINING: CPT

## 2018-06-28 RX ORDER — BISACODYL 10 MG
10 SUPPOSITORY, RECTAL RECTAL DAILY
Status: DISCONTINUED | OUTPATIENT
Start: 2018-06-28 | End: 2018-06-28 | Stop reason: HOSPADM

## 2018-06-28 RX ADMIN — BISACODYL 10 MG: 10 SUPPOSITORY RECTAL at 10:24

## 2018-06-28 RX ADMIN — ANTACID TABLETS 500 MG: 500 TABLET, CHEWABLE ORAL at 09:06

## 2018-06-28 RX ADMIN — OXYCODONE HYDROCHLORIDE 5 MG: 5 TABLET ORAL at 15:45

## 2018-06-28 RX ADMIN — LEVOTHYROXINE SODIUM 75 MCG: 75 TABLET ORAL at 06:05

## 2018-06-28 RX ADMIN — VITAMIN D, TAB 1000IU (100/BT) 5000 UNITS: 25 TAB at 09:05

## 2018-06-28 RX ADMIN — ACETAMINOPHEN 1000 MG: 500 TABLET, FILM COATED ORAL at 10:24

## 2018-06-28 RX ADMIN — ACETAMINOPHEN 1000 MG: 500 TABLET, FILM COATED ORAL at 06:05

## 2018-06-28 RX ADMIN — LOSARTAN POTASSIUM 100 MG: 50 TABLET ORAL at 09:06

## 2018-06-28 RX ADMIN — DORZOLAMIDE HYDROCHLORIDE 1 DROP: 20 SOLUTION/ DROPS OPHTHALMIC at 09:09

## 2018-06-28 RX ADMIN — OXYCODONE HYDROCHLORIDE 5 MG: 5 TABLET ORAL at 09:05

## 2018-06-28 RX ADMIN — HYDROCHLOROTHIAZIDE 25 MG: 25 TABLET ORAL at 09:06

## 2018-06-28 RX ADMIN — ONDANSETRON 4 MG: 4 TABLET, ORALLY DISINTEGRATING ORAL at 10:24

## 2018-06-28 RX ADMIN — DOCUSATE SODIUM 100 MG: 100 CAPSULE ORAL at 09:06

## 2018-06-28 ASSESSMENT — COGNITIVE AND FUNCTIONAL STATUS - GENERAL
MOVING FROM LYING ON BACK TO SITTING ON SIDE OF FLAT BED: A LITTLE
TOILETING: A LITTLE
DAILY ACTIVITIY SCORE: 20
DRESSING REGULAR LOWER BODY CLOTHING: A LITTLE
TURNING FROM BACK TO SIDE WHILE IN FLAT BAD: A LITTLE
STANDING UP FROM CHAIR USING ARMS: A LITTLE
HELP NEEDED FOR BATHING: A LITTLE
MOVING TO AND FROM BED TO CHAIR: UNABLE
MOBILITY SCORE: 15
WALKING IN HOSPITAL ROOM: A LITTLE
SUGGESTED CMS G CODE MODIFIER DAILY ACTIVITY: CJ
CLIMB 3 TO 5 STEPS WITH RAILING: A LOT
DRESSING REGULAR UPPER BODY CLOTHING: A LITTLE
SUGGESTED CMS G CODE MODIFIER MOBILITY: CK

## 2018-06-28 ASSESSMENT — GAIT ASSESSMENTS
DISTANCE (FEET): 75
GAIT LEVEL OF ASSIST: CONTACT GUARD ASSIST
DEVIATION: BRADYKINETIC;SHUFFLED GAIT
ASSISTIVE DEVICE: 4 WHEEL WALKER

## 2018-06-28 ASSESSMENT — PAIN SCALES - GENERAL
PAINLEVEL_OUTOF10: 3
PAINLEVEL_OUTOF10: 4
PAINLEVEL_OUTOF10: 2
PAINLEVEL_OUTOF10: 4
PAINLEVEL_OUTOF10: 2

## 2018-06-28 NOTE — THERAPY
"Occupational Therapy Treatment completed with focus on ADLs, ADL transfers and patient education.  Functional Status:  Pt seen for OT tx. Min A supine > sit, max A for c-collar management. SBA sit > stand, SBA amb w/ 4WW in room and into BR. Completed toilet transfer w/ SBA. Supv for pericare. Completed light grooming at the sink w/ setup and supv. Pt pleasant and cooperative. Pt educated about wearing c-collar when OOB and how to change c-collar pads. Pt c/o being dizzy during session but after standing rest breaks able to tolerate OOB activity. Pt will d/c home w/ assistance from family as needed upon appropriate medical d/c home.   Plan of Care: Will benefit from Occupational Therapy 2 times per week  Discharge Recommendations:  Equipment Will Continue to Assess for Equipment Needs.     See \"Rehab Therapy-Acute\" Patient Summary Report for complete documentation.   "

## 2018-06-28 NOTE — DISCHARGE PLANNING
Anticipated Discharge Disposition:   Home with HH- Pt was accepted by [facility]    Action:   Called [facility] who informed CM that pt has been accepted by [facility]  IMM letter given. Explained to pt, pt signed, copy to pt and to chart.      Barriers to Discharge:   none    Plan:   Dc home today.

## 2018-06-28 NOTE — PROGRESS NOTES
Alert and oriented x 4. Reported slight pain on neck and left knee. PRN given, see MAR. Reported tingling on finger tips, denies any numbness. Neck incision is CDI with slight swelling. Patient denies difficulty in swalowing. Safety education provided. Bed alarm in place. Call light within reach.

## 2018-06-28 NOTE — PROGRESS NOTES
Neurosurgery Progress Note    Subjective:  Pt awake, alert  [place] speaking   Pain controlled, has some pain dorsal left foot  No arm pain or n/t  Swallowing fine   Voiding  No bm yet        Exam:  UE str 5/5   Sens equal to light touch  Inc c/d/I with dermabond      BP  Min: 123/60  Max: 149/72  Pulse  Av.7  Min: 93  Max: 101  Resp  Av  Min: 16  Max: 16  Temp  Av.5 °C (97.7 °F)  Min: 36.4 °C (97.6 °F)  Max: 36.6 °C (97.8 °F)  SpO2  Av.7 %  Min: 92 %  Max: 99 %    No Data Recorded                      Intake/Output     None          No intake or output data in the 24 hours ending 18 0915         • cyclobenzaprine  5 mg Q8HRS PRN    Or   • methocarbamol  750 mg Q8HRS PRN    Or   • diazePAM  5 mg Q4HRS PRN   • nystatin   TID PRN   • LR   Continuous   • dorzolamide  1 Drop BID   • levothyroxine  75 mcg AM ES   • meclizine  25 mg TID PRN   • Pharmacy Consult Request  1 Each PRN   • MD ALERT...Do not administer NSAIDS or ASPIRIN unless ORDERED By Neurosurgery  1 Each PRN   • docusate sodium  100 mg BID   • senna-docusate  1 Tab Nightly   • senna-docusate  1 Tab Q24HRS PRN   • polyethylene glycol/lytes  1 Packet BID PRN   • magnesium hydroxide  30 mL QDAY PRN   • bisacodyl  10 mg Q24HRS PRN   • fleet  1 Each Once PRN   • 0.9 % NaCl with KCl 20 mEq 1,000 mL   Continuous   • acetaminophen  1,000 mg Q6HRS   • oxyCODONE immediate-release  5 mg Q3HRS PRN   • oxyCODONE immediate release  10 mg Q3HRS PRN   • morphine injection  4 mg Q3HRS PRN   • diphenhydrAMINE  25 mg Q6HRS PRN    Or   • diphenhydrAMINE  25 mg Q6HRS PRN   • ondansetron  4 mg Q4HRS PRN   • ondansetron  4 mg Q4HRS PRN   • cloNIDine  0.1 mg Q4HRS PRN   • hydrALAZINE  10 mg Q HOUR PRN   • labetalol  10 mg Q HOUR PRN   • benzocaine-menthol  1 Lozenge Q2HRS PRN   • calcium carbonate  500 mg BID   • vitamin D  5,000 Units DAILY   • latanoprost  1 Drop Q EVENING   • losartan  100 mg Q DAY    And   • hydroCHLOROthiazide  25 mg Q DAY        Assessment and Plan:  POD # 3 Anterior cervical discectomy/fusion 4-7, liseth C5  Prophylactic anticoagulation: no           PT/OT - c-collar when OOB  Pain control  Bowel/ bladder protocol - give supp this am  Home today after BM with home health pt/nursing

## 2018-06-28 NOTE — DISCHARGE INSTRUCTIONS
Discharge Instructions    Discharged to home by car with relative. Discharged via wheelchair, hospital escort: Yes.  Special equipment needed: C-Collar    Be sure to schedule a follow-up appointment with your primary care doctor or any specialists as instructed.     Discharge Plan:   Pneumococcal Vaccine Administered/Refused: Not given - Patient refused pneumococcal vaccine  Influenza Vaccine Indication: Patient Refuses    I understand that a diet low in cholesterol, fat, and sodium is recommended for good health. Unless I have been given specific instructions below for another diet, I accept this instruction as my diet prescription.   Other diet: Regular    Special Instructions: None    · Is patient discharged on Warfarin / Coumadin?   No     Follow up with [facility] at [facility] in 2 weeks [phone]  Follow up with Dr. Doyle in 6 weeks  No pushing, pulling, lifting greater than 10 pounds  No repetitive motion above shoulder level  Ok to shower, pat incision dry - 24 hours after drain was removed  No non-steroidal anti-inflammatory medications or aspirin until cleared by Dr. Doyle  Ambulate as much is comfortable  No driving until cleared  Obtain over the counter senekot take 1-2 tablets daily while taking narcotic pain medication  Wear brace at all times when out of bed, may shower without brace.      Depression / Suicide Risk    As you are discharged from this [facility] facility, it is important to learn how to keep safe from harming yourself.    Recognize the warning signs:  · Abrupt changes in personality, positive or negative- including increase in energy   · Giving away possessions  · Change in eating patterns- significant weight changes-  positive or negative  · Change in sleeping patterns- unable to sleep or sleeping all the time   · Unwillingness or inability to communicate  · Depression  · Unusual sadness, discouragement and loneliness  · Talk of wanting to die  · Neglect of personal  appearance   · Rebelliousness- reckless behavior  · Withdrawal from people/activities they love  · Confusion- inability to concentrate     If you or a loved one observes any of these behaviors or has concerns about self-harm, here's what you can do:  · Talk about it- your feelings and reasons for harming yourself  · Remove any means that you might use to hurt yourself (examples: pills, rope, extension cords, firearm)  · Get professional help from the community (Mental Health, Substance Abuse, psychological counseling)  · Do not be alone:Call your Safe Contact- someone whom you trust who will be there for you.  · Call your local CRISIS HOTLINE 072-3541 or 802-572-5578  · Call your local Children's Mobile Crisis Response Team [place] (938) 832-0724 or www.Clinverse  · Call the toll free National Suicide Prevention Hotlines   · National Suicide Prevention Lifeline 217-875-GQMA (0888)  · MailTrack[employer]io Line Network 800-SUICIDE (041-9253)    Disquectomía cervical anterior y fusión  (Anterior Cervical Diskectomy and Fusion)  La disquectomía cervical anterior y fusión es elvia cirugía que se realiza para extirpar y reemplazar un disco intervertebral. Los discos intervertebrales son láminas de cartílago que se encuentran entre los huesos de la columna vertebral. Esta cirugía se realiza cuando un disco intervertebral del martha ejerce presión sobre la columna vertebral o sobre un nervio.  Se hace a través de la parte delantera (anterior) del martha. Ana la cirugía, se extirpa el disco dañado y se lo reemplaza por un implante plástico, por un hueso de otra parte del cuerpo (injerto óseo) o por ambos. A veces, también se colocan en el martha placas de metal y tornillos (dispositivos) para mantener el implante o el injerto óseo en gerardo lugar, y para permitir que los huesos crezcan juntos (se fusionen).  INFORME A GERARDO MÉDICO:  · Cualquier alergia que tenga.  · Todos los medicamentos que utiliza, incluidos vitaminas,  hierbas, gotas oftálmicas, cremas y medicamentos de venta michelet.  · Problemas previos que usted o los miembros de gerardo amy hayan tenido con el uso de anestésicos.  · Enfermedades de la marcos que tenga.  · Si tiene cirugías previas.  · Si tiene alguna enfermedad.  · Si está embarazada o podría estarlo.  RIESGOS Y COMPLICACIONES  En general, se trata de un procedimiento seguro. Sin embargo, pueden ocurrir complicaciones, por ejemplo:  · Infección.  · Sangrado y posible necesidad de elvia transfusión.  · Lesión en las estructuras circundantes, incluidos los nervios.  · Pérdida de líquido del cerebro o la médula garcia (líquido cefalorraquídeo).  · Coágulos sanguíneos.  · Dificultades respiratorias temporarias.  ANTES DEL PROCEDIMIENTO  · Siga las indicaciones del médico respecto de las restricciones para las comidas o las bebidas.  · Consulte al médico si debe hacer o no lo siguiente:  ¨ Cambiar o suspender los medicamentos que denzel habitualmente. [place]bany es muy importante si denzel medicamentos para la diabetes o anticoagulantes.  ¨ [place] medicamentos angela aspirina e ibuprofeno. Estos medicamentos pueden tener un efecto anticoagulante en la marcos. No tome estos medicamentos antes del procedimiento si el médico le indica que no lo leidy.  · Pueden indicarle antibióticos para prevenir infecciones.  PROCEDIMIENTO  · Le colocarán elvia vía intravenosa (IV) en elvia de las venas.  · Le administrarán yaw o más de los siguientes medicamentos:  ¨ Un medicamento para ayudarlo a relajarse (sedante).  ¨ Un medicamento que lo hará dormir (anestesia general).  · Le colocarán un tubo para que pueda respirar.  · Le limpiarán el martha con elvia solución que destruirá las bacterias (solución antiséptica).  · El cirujano le realizará un doroteo (incisión) en la parte delantera del martha.  · Le separarán los músculos del martha.  · Le extirparán el disco lesionado y cualquier hueso dañado.  · La marla donde se extirpó el disco se rellenará con un  pequeño implante plástico, con un injerto óseo o con ambos.  · Le pueden colocar un dispositivo en el martha.  · La incisión se cerrará con puntos (suturas).  · Se pueden colocar cintas adhesivas o adhesivo para la piel sobre la incisión.  · Se pondrá elvia venda (vendaje) sobre la incisión.  Colleen procedimiento puede variar según el médico y el hospital.  DESPUÉS DEL PROCEDIMIENTO  · Le controlarán con frecuencia la presión arterial, la frecuencia cardíaca, la frecuencia respiratoria y el nivel de oxígeno en la marcos hasta que haya desaparecido el efecto de los medicamentos administrados.  · Lo supervisarán para detectar cualquier complicación del procedimiento, incluidas las siguientes:  ¨ Demasiado sangrado del lugar de la incisión.  ¨ Elvia acumulación de marcos debajo de la piel en el sitio quirúrgico.  ¨ Dificultad para respirar.  · Pueden seguir administrándole antibióticos.  · Puede comenzar a comer cuando se sienta cómodo para hacerlo.  · Es posible que le den un dispositivo ortopédico para que use en el martha. Colleen dispositivo limita el movimiento del martha mientras los huesos se fusionan.  Esta información no tiene angela fin reemplazar el consejo del médico. Asegúrese de hacerle al médico cualquier pregunta que tenga.  Document Released: 04/14/2014 Document Revised: 04/10/2017 Document Reviewed: 12/01/2016  Elsevier Interactive Patient Education © 2017 Elsevier Inc.

## 2018-06-28 NOTE — DISCHARGE PLANNING
Received Choice form at 9:20 AM  Agency/Facility Name: [facility]  Referral sent per Choice form @ 9:20 am.

## 2018-06-28 NOTE — THERAPY
"Pt w/impaired balance, gait, and activity tolerance. Pt appeares to be limited by dizziness and nausea, otherwise suspect pt mobility to be improved. Pt demonstrated shirt stride and shuffled gait w/decreased heel-toe off, requiring veral cuing. Pt would benefit from further acute PT txs to progress towards goals and independence. Anticipate pt to be able to return home w/family support and HH when medically cleared. If family is unable to provide 24/7 assistance, post acute placement would be recommended to ensure a safe transition home.    Physical Therapy Treatment completed.   Bed Mobility:  Supine to Sit:  (NT--up w/OT)  Transfers: Sit to Stand: Stand by Assist  Gait: Level Of Assist: Contact Guard Assist with 4-Wheel Walker       Plan of Care: Will benefit from Physical Therapy 5 times per week    See \"Rehab Therapy-Acute\" Patient Summary Report for complete documentation.       "

## 2018-06-29 ENCOUNTER — PATIENT OUTREACH (OUTPATIENT)
Dept: HEALTH INFORMATION MANAGEMENT | Facility: OTHER | Age: 78
End: 2018-06-29

## 2018-06-29 NOTE — DISCHARGE SUMMARY
DATE OF ADMISSION:  06/25/2018    DATE OF DISCHARGE:  06/28/2018    ADMITTING DIAGNOSIS:  Cervical spondylitic myelopathy.    COURSE OF HOSPITALIZATION:  The patient was admitted to [facility] [place].  On date of admission, she was brought to the operating room   where she underwent anterior cervical diskectomy C4-C5, C5-C6 and C6-C7 with   C5 corpectomy and fusion C4-C7 with Dr. Lester Doyle.    Postoperatively, overall the patient has done very well.  She does not have   any arm pain or numbness or tingling.  She is Dominican speaking.  She is   swallowing without difficulty.  She is voiding or working on a bowel movement   prior to discharge.  Her upper extremity strength 5/5.  Sensation is equal to   light touch.  Her incision is clean, dry, and intact with Dermabond.  Her   drain has been removed.  Her vital signs have been stable.  She has worked   with physical therapy.  We will order her home health, PT and nursing.  She   was instructed to wear a cervical collar at all times when out of bed.  She   will be discharged home today on postoperative day #3.    DISCHARGE MEDICATIONS:  1.  Percocet 5/325 mg tablets 1 every 6 hours as needed for pain, #28, no   refills.  2.  Flexeril 5 mg 1 p.o. t.i.d. p.r.n. spasms, #30, no refills.    DISCHARGE INSTRUCTIONS:  Patient was given standard postoperative   instructions.  She will follow up with our office at 2 and 6 weeks   postoperatively.  If she needs anything in the meantime, she was instructed   not to hesitate to contact us.  She was reminded not take any aspirin or   anti-inflammatory products.  She will be discharged in stable medical   condition.       ____________________________________     HUI PARRA / MANUEL    DD:  06/28/2018 13:55:44  DT:  06/28/2018 19:44:28    D#:  [id number]  Job#:  [id number]

## 2018-06-29 NOTE — PROGRESS NOTES
Pt wheeled out to niece's vehicle with all belongings and prescriptions and [place] Collar in place. Pt and niece verbalized understanding of discharge instructions.

## 2018-07-02 ENCOUNTER — PATIENT OUTREACH (OUTPATIENT)
Dept: HEALTH INFORMATION MANAGEMENT | Facility: OTHER | Age: 78
End: 2018-07-02

## 2018-07-30 ENCOUNTER — PATIENT OUTREACH (OUTPATIENT)
Dept: HEALTH INFORMATION MANAGEMENT | Facility: OTHER | Age: 78
End: 2018-07-30

## 2018-08-04 ENCOUNTER — HOSPITAL ENCOUNTER (OUTPATIENT)
Dept: RADIOLOGY | Facility: MEDICAL CENTER | Age: 78
End: 2018-08-04
Attending: NURSE PRACTITIONER
Payer: MEDICARE

## 2018-08-04 DIAGNOSIS — M48.02 SPINAL STENOSIS IN CERVICAL REGION: ICD-10-CM

## 2018-08-04 PROCEDURE — 72040 X-RAY EXAM NECK SPINE 2-3 VW: CPT

## 2018-08-06 NOTE — PROGRESS NOTES
Debra Stockton was admitted to [facility] on 6/25/18 for an anterior cervical disk fusion, and was discharged on 6/28/18. Per discharge orders, patient had a follow-up appointment with Dr. Garcia (PCP) on 7/20/18. Patient was also discharged with home health through [facility] for skilled nursing and physical therapy. [facility] patient advocate was able to successfully engage with the patient’s authorized caregiver post-discharge and many times throughout the life cycle. [facility] patient advocate assisted the patient by reaching out multiple times to [facility] and Dr. Doyle’ office to coordinate an order for a shower chair. After order was coordinated, patient was not covered for the DME. DME company advised patient to purchase one from a drug store. [facility] referred patient to [facility] as an alternative to obtain the DME item. Patient has no future appointments.

## 2018-11-09 ENCOUNTER — HOSPITAL ENCOUNTER (OUTPATIENT)
Dept: LAB | Facility: MEDICAL CENTER | Age: 78
End: 2018-11-09
Attending: FAMILY MEDICINE
Payer: MEDICARE

## 2018-11-09 LAB
ALBUMIN SERPL BCP-MCNC: 4 G/DL (ref 3.2–4.9)
ALBUMIN/GLOB SERPL: 0.9 G/DL
ALP SERPL-CCNC: 101 U/L (ref 30–99)
ALT SERPL-CCNC: 13 U/L (ref 2–50)
ANION GAP SERPL CALC-SCNC: 4 MMOL/L (ref 0–11.9)
AST SERPL-CCNC: 19 U/L (ref 12–45)
BILIRUB SERPL-MCNC: 0.3 MG/DL (ref 0.1–1.5)
BUN SERPL-MCNC: 17 MG/DL (ref 8–22)
CALCIUM SERPL-MCNC: 9.6 MG/DL (ref 8.5–10.5)
CHLORIDE SERPL-SCNC: 99 MMOL/L (ref 96–112)
CO2 SERPL-SCNC: 32 MMOL/L (ref 20–33)
CREAT SERPL-MCNC: 0.84 MG/DL (ref 0.5–1.4)
EST. AVERAGE GLUCOSE BLD GHB EST-MCNC: 140 MG/DL
GLOBULIN SER CALC-MCNC: 4.4 G/DL (ref 1.9–3.5)
GLUCOSE SERPL-MCNC: 120 MG/DL (ref 65–99)
HBA1C MFR BLD: 6.5 % (ref 0–5.6)
POTASSIUM SERPL-SCNC: 3.8 MMOL/L (ref 3.6–5.5)
PROT SERPL-MCNC: 8.4 G/DL (ref 6–8.2)
SODIUM SERPL-SCNC: 135 MMOL/L (ref 135–145)
T4 FREE SERPL-MCNC: 1.3 NG/DL (ref 0.53–1.43)
TSH SERPL DL<=0.005 MIU/L-ACNC: 1.77 UIU/ML (ref 0.38–5.33)

## 2018-11-09 PROCEDURE — 36415 COLL VENOUS BLD VENIPUNCTURE: CPT

## 2018-11-09 PROCEDURE — 84439 ASSAY OF FREE THYROXINE: CPT

## 2018-11-09 PROCEDURE — 83036 HEMOGLOBIN GLYCOSYLATED A1C: CPT

## 2018-11-09 PROCEDURE — 80053 COMPREHEN METABOLIC PANEL: CPT

## 2018-11-09 PROCEDURE — 84443 ASSAY THYROID STIM HORMONE: CPT

## 2018-11-13 ENCOUNTER — HOSPITAL ENCOUNTER (OUTPATIENT)
Dept: RADIOLOGY | Facility: MEDICAL CENTER | Age: 78
End: 2018-11-13
Attending: CLINICAL NURSE SPECIALIST
Payer: MEDICARE

## 2018-11-13 DIAGNOSIS — M54.2 CERVICALGIA: ICD-10-CM

## 2018-11-13 PROCEDURE — 72040 X-RAY EXAM NECK SPINE 2-3 VW: CPT

## 2019-05-08 ENCOUNTER — HOSPITAL ENCOUNTER (OUTPATIENT)
Dept: LAB | Facility: MEDICAL CENTER | Age: 79
End: 2019-05-08
Attending: FAMILY MEDICINE
Payer: MEDICARE

## 2019-05-08 LAB
ALBUMIN SERPL BCP-MCNC: 4.1 G/DL (ref 3.2–4.9)
ALBUMIN/GLOB SERPL: 1 G/DL
ALP SERPL-CCNC: 88 U/L (ref 30–99)
ALT SERPL-CCNC: 12 U/L (ref 2–50)
ANION GAP SERPL CALC-SCNC: 8 MMOL/L (ref 0–11.9)
AST SERPL-CCNC: 20 U/L (ref 12–45)
BILIRUB SERPL-MCNC: 0.4 MG/DL (ref 0.1–1.5)
BUN SERPL-MCNC: 19 MG/DL (ref 8–22)
CALCIUM SERPL-MCNC: 9.4 MG/DL (ref 8.5–10.5)
CHLORIDE SERPL-SCNC: 100 MMOL/L (ref 96–112)
CHOLEST SERPL-MCNC: 155 MG/DL (ref 100–199)
CO2 SERPL-SCNC: 31 MMOL/L (ref 20–33)
CREAT SERPL-MCNC: 0.78 MG/DL (ref 0.5–1.4)
GLOBULIN SER CALC-MCNC: 4 G/DL (ref 1.9–3.5)
GLUCOSE SERPL-MCNC: 113 MG/DL (ref 65–99)
HDLC SERPL-MCNC: 57 MG/DL
LDLC SERPL CALC-MCNC: 84 MG/DL
POTASSIUM SERPL-SCNC: 4.8 MMOL/L (ref 3.6–5.5)
PROT SERPL-MCNC: 8.1 G/DL (ref 6–8.2)
SODIUM SERPL-SCNC: 139 MMOL/L (ref 135–145)
TRIGL SERPL-MCNC: 70 MG/DL (ref 0–149)

## 2019-05-08 PROCEDURE — 80053 COMPREHEN METABOLIC PANEL: CPT

## 2019-05-08 PROCEDURE — 83036 HEMOGLOBIN GLYCOSYLATED A1C: CPT

## 2019-05-08 PROCEDURE — 36415 COLL VENOUS BLD VENIPUNCTURE: CPT

## 2019-05-08 PROCEDURE — 84439 ASSAY OF FREE THYROXINE: CPT

## 2019-05-08 PROCEDURE — 84443 ASSAY THYROID STIM HORMONE: CPT

## 2019-05-08 PROCEDURE — 80061 LIPID PANEL: CPT

## 2019-05-09 LAB
T4 FREE SERPL-MCNC: 1.12 NG/DL (ref 0.53–1.43)
TSH SERPL DL<=0.005 MIU/L-ACNC: 11.67 UIU/ML (ref 0.38–5.33)

## 2019-05-10 LAB
EST. AVERAGE GLUCOSE BLD GHB EST-MCNC: 134 MG/DL
HBA1C MFR BLD: 6.3 % (ref 0–5.6)

## 2019-05-24 ENCOUNTER — HOSPITAL ENCOUNTER (OUTPATIENT)
Dept: RADIOLOGY | Facility: MEDICAL CENTER | Age: 79
End: 2019-05-24
Attending: FAMILY MEDICINE
Payer: MEDICARE

## 2019-05-24 DIAGNOSIS — M25.511 RIGHT SHOULDER PAIN, UNSPECIFIED CHRONICITY: ICD-10-CM

## 2019-05-24 PROCEDURE — 73030 X-RAY EXAM OF SHOULDER: CPT | Mod: RT

## 2019-08-06 ENCOUNTER — HOSPITAL ENCOUNTER (OUTPATIENT)
Dept: LAB | Facility: MEDICAL CENTER | Age: 79
End: 2019-08-06
Attending: FAMILY MEDICINE
Payer: MEDICARE

## 2019-08-06 LAB
ALBUMIN SERPL BCP-MCNC: 4.1 G/DL (ref 3.2–4.9)
ALBUMIN/GLOB SERPL: 0.9 G/DL
ALP SERPL-CCNC: 111 U/L (ref 30–99)
ALT SERPL-CCNC: 16 U/L (ref 2–50)
ANION GAP SERPL CALC-SCNC: 6 MMOL/L (ref 0–11.9)
AST SERPL-CCNC: 21 U/L (ref 12–45)
BILIRUB SERPL-MCNC: 0.5 MG/DL (ref 0.1–1.5)
BUN SERPL-MCNC: 23 MG/DL (ref 8–22)
CALCIUM SERPL-MCNC: 9.5 MG/DL (ref 8.5–10.5)
CHLORIDE SERPL-SCNC: 100 MMOL/L (ref 96–112)
CHOLEST SERPL-MCNC: 153 MG/DL (ref 100–199)
CO2 SERPL-SCNC: 31 MMOL/L (ref 20–33)
CREAT SERPL-MCNC: 0.87 MG/DL (ref 0.5–1.4)
EST. AVERAGE GLUCOSE BLD GHB EST-MCNC: 137 MG/DL
GLOBULIN SER CALC-MCNC: 4.6 G/DL (ref 1.9–3.5)
GLUCOSE SERPL-MCNC: 110 MG/DL (ref 65–99)
HBA1C MFR BLD: 6.4 % (ref 0–5.6)
HDLC SERPL-MCNC: 60 MG/DL
LDLC SERPL CALC-MCNC: 79 MG/DL
POTASSIUM SERPL-SCNC: 4.1 MMOL/L (ref 3.6–5.5)
PROT SERPL-MCNC: 8.7 G/DL (ref 6–8.2)
SODIUM SERPL-SCNC: 137 MMOL/L (ref 135–145)
T4 FREE SERPL-MCNC: 1.21 NG/DL (ref 0.53–1.43)
TRIGL SERPL-MCNC: 69 MG/DL (ref 0–149)
TSH SERPL DL<=0.005 MIU/L-ACNC: 3.55 UIU/ML (ref 0.38–5.33)

## 2019-08-06 PROCEDURE — 80053 COMPREHEN METABOLIC PANEL: CPT

## 2019-08-06 PROCEDURE — 83036 HEMOGLOBIN GLYCOSYLATED A1C: CPT

## 2019-08-06 PROCEDURE — 36415 COLL VENOUS BLD VENIPUNCTURE: CPT

## 2019-08-06 PROCEDURE — 84439 ASSAY OF FREE THYROXINE: CPT

## 2019-08-06 PROCEDURE — 84443 ASSAY THYROID STIM HORMONE: CPT

## 2019-08-06 PROCEDURE — 80061 LIPID PANEL: CPT

## 2019-08-10 ENCOUNTER — HOSPITAL ENCOUNTER (OUTPATIENT)
Dept: RADIOLOGY | Facility: MEDICAL CENTER | Age: 79
End: 2019-08-10
Attending: NEUROLOGICAL SURGERY
Payer: MEDICARE

## 2019-08-10 DIAGNOSIS — M54.2 CERVICALGIA: ICD-10-CM

## 2019-08-10 PROCEDURE — 72040 X-RAY EXAM NECK SPINE 2-3 VW: CPT

## 2019-08-21 ENCOUNTER — HOSPITAL ENCOUNTER (EMERGENCY)
Facility: MEDICAL CENTER | Age: 79
End: 2019-08-21
Attending: EMERGENCY MEDICINE
Payer: MEDICARE

## 2019-08-21 ENCOUNTER — OFFICE VISIT (OUTPATIENT)
Dept: URGENT CARE | Facility: CLINIC | Age: 79
End: 2019-08-21
Payer: MEDICARE

## 2019-08-21 ENCOUNTER — APPOINTMENT (OUTPATIENT)
Dept: RADIOLOGY | Facility: MEDICAL CENTER | Age: 79
End: 2019-08-21
Payer: MEDICARE

## 2019-08-21 ENCOUNTER — APPOINTMENT (OUTPATIENT)
Dept: RADIOLOGY | Facility: MEDICAL CENTER | Age: 79
End: 2019-08-21
Attending: EMERGENCY MEDICINE
Payer: MEDICARE

## 2019-08-21 VITALS
OXYGEN SATURATION: 94 % | RESPIRATION RATE: 18 BRPM | DIASTOLIC BLOOD PRESSURE: 77 MMHG | HEART RATE: 78 BPM | WEIGHT: 128.6 LBS | SYSTOLIC BLOOD PRESSURE: 154 MMHG | BODY MASS INDEX: 26.99 KG/M2 | TEMPERATURE: 97.5 F | HEIGHT: 58 IN

## 2019-08-21 VITALS
TEMPERATURE: 98.1 F | DIASTOLIC BLOOD PRESSURE: 109 MMHG | HEIGHT: 58 IN | SYSTOLIC BLOOD PRESSURE: 172 MMHG | OXYGEN SATURATION: 97 % | HEART RATE: 89 BPM | RESPIRATION RATE: 12 BRPM | BODY MASS INDEX: 26.99 KG/M2 | WEIGHT: 128.6 LBS

## 2019-08-21 DIAGNOSIS — R07.9 CHEST PAIN, UNSPECIFIED TYPE: ICD-10-CM

## 2019-08-21 DIAGNOSIS — R03.0 ELEVATED BLOOD PRESSURE READING: ICD-10-CM

## 2019-08-21 LAB
ALBUMIN SERPL BCP-MCNC: 4.1 G/DL (ref 3.2–4.9)
ALBUMIN/GLOB SERPL: 1 G/DL
ALP SERPL-CCNC: 94 U/L (ref 30–99)
ALT SERPL-CCNC: 15 U/L (ref 2–50)
ANION GAP SERPL CALC-SCNC: 11 MMOL/L (ref 0–11.9)
AST SERPL-CCNC: 24 U/L (ref 12–45)
BASOPHILS # BLD AUTO: 0.5 % (ref 0–1.8)
BASOPHILS # BLD: 0.05 K/UL (ref 0–0.12)
BILIRUB SERPL-MCNC: 0.4 MG/DL (ref 0.1–1.5)
BUN SERPL-MCNC: 19 MG/DL (ref 8–22)
CALCIUM SERPL-MCNC: 9.9 MG/DL (ref 8.5–10.5)
CHLORIDE SERPL-SCNC: 98 MMOL/L (ref 96–112)
CO2 SERPL-SCNC: 25 MMOL/L (ref 20–33)
COMMENT 1642: NORMAL
CREAT SERPL-MCNC: 0.88 MG/DL (ref 0.5–1.4)
EKG IMPRESSION: NORMAL
EOSINOPHIL # BLD AUTO: 0.28 K/UL (ref 0–0.51)
EOSINOPHIL NFR BLD: 3 % (ref 0–6.9)
ERYTHROCYTE [DISTWIDTH] IN BLOOD BY AUTOMATED COUNT: 44.7 FL (ref 35.9–50)
GLOBULIN SER CALC-MCNC: 4 G/DL (ref 1.9–3.5)
GLUCOSE SERPL-MCNC: 108 MG/DL (ref 65–99)
HCT VFR BLD AUTO: 40.1 % (ref 37–47)
HGB BLD-MCNC: 12.7 G/DL (ref 12–16)
IMM GRANULOCYTES # BLD AUTO: 0.04 K/UL (ref 0–0.11)
IMM GRANULOCYTES NFR BLD AUTO: 0.4 % (ref 0–0.9)
LYMPHOCYTES # BLD AUTO: 3.15 K/UL (ref 1–4.8)
LYMPHOCYTES NFR BLD: 33.7 % (ref 22–41)
MCH RBC QN AUTO: 27 PG (ref 27–33)
MCHC RBC AUTO-ENTMCNC: 31.7 G/DL (ref 33.6–35)
MCV RBC AUTO: 85.3 FL (ref 81.4–97.8)
MONOCYTES # BLD AUTO: 0.55 K/UL (ref 0–0.85)
MONOCYTES NFR BLD AUTO: 5.9 % (ref 0–13.4)
MORPHOLOGY BLD-IMP: NORMAL
NEUTROPHILS # BLD AUTO: 5.29 K/UL (ref 2–7.15)
NEUTROPHILS NFR BLD: 56.5 % (ref 44–72)
NRBC # BLD AUTO: 0 K/UL
NRBC BLD-RTO: 0 /100 WBC
PLATELET # BLD AUTO: 259 K/UL (ref 164–446)
PMV BLD AUTO: 10.4 FL (ref 9–12.9)
POTASSIUM SERPL-SCNC: 4.2 MMOL/L (ref 3.6–5.5)
PROT SERPL-MCNC: 8.1 G/DL (ref 6–8.2)
RBC # BLD AUTO: 4.7 M/UL (ref 4.2–5.4)
SODIUM SERPL-SCNC: 134 MMOL/L (ref 135–145)
T4 FREE SERPL-MCNC: 1.37 NG/DL (ref 0.53–1.43)
TROPONIN T SERPL-MCNC: 14 NG/L (ref 6–19)
TSH SERPL DL<=0.005 MIU/L-ACNC: 2.54 UIU/ML (ref 0.38–5.33)
WBC # BLD AUTO: 9.4 K/UL (ref 4.8–10.8)

## 2019-08-21 PROCEDURE — 99284 EMERGENCY DEPT VISIT MOD MDM: CPT

## 2019-08-21 PROCEDURE — 99214 OFFICE O/P EST MOD 30 MIN: CPT | Performed by: PHYSICIAN ASSISTANT

## 2019-08-21 PROCEDURE — 80053 COMPREHEN METABOLIC PANEL: CPT

## 2019-08-21 PROCEDURE — 93005 ELECTROCARDIOGRAM TRACING: CPT | Performed by: EMERGENCY MEDICINE

## 2019-08-21 PROCEDURE — 700101 HCHG RX REV CODE 250: Performed by: EMERGENCY MEDICINE

## 2019-08-21 PROCEDURE — 93005 ELECTROCARDIOGRAM TRACING: CPT

## 2019-08-21 PROCEDURE — 71045 X-RAY EXAM CHEST 1 VIEW: CPT

## 2019-08-21 PROCEDURE — 84439 ASSAY OF FREE THYROXINE: CPT

## 2019-08-21 PROCEDURE — 84443 ASSAY THYROID STIM HORMONE: CPT

## 2019-08-21 PROCEDURE — 85025 COMPLETE CBC W/AUTO DIFF WBC: CPT

## 2019-08-21 PROCEDURE — 96374 THER/PROPH/DIAG INJ IV PUSH: CPT

## 2019-08-21 PROCEDURE — 84484 ASSAY OF TROPONIN QUANT: CPT

## 2019-08-21 RX ORDER — LABETALOL HYDROCHLORIDE 5 MG/ML
10 INJECTION, SOLUTION INTRAVENOUS ONCE
Status: COMPLETED | OUTPATIENT
Start: 2019-08-21 | End: 2019-08-21

## 2019-08-21 RX ORDER — LABETALOL HYDROCHLORIDE 5 MG/ML
10 INJECTION, SOLUTION INTRAVENOUS ONCE
Status: DISCONTINUED | OUTPATIENT
Start: 2019-08-21 | End: 2019-08-21

## 2019-08-21 RX ORDER — LOSARTAN POTASSIUM AND HYDROCHLOROTHIAZIDE 12.5; 1 MG/1; MG/1
1 TABLET ORAL
Refills: 3 | Status: ON HOLD | COMMUNITY
Start: 2019-07-22 | End: 2022-06-15

## 2019-08-21 RX ADMIN — LABETALOL HYDROCHLORIDE 10 MG: 5 INJECTION INTRAVENOUS at 22:43

## 2019-08-21 ASSESSMENT — ENCOUNTER SYMPTOMS
DIZZINESS: 0
DIARRHEA: 0
NAUSEA: 0
BLURRED VISION: 1
ABDOMINAL PAIN: 0
PALPITATIONS: 0
SHORTNESS OF BREATH: 0
MUSCULOSKELETAL NEGATIVE: 1
FEVER: 0
CHILLS: 0
VOMITING: 0

## 2019-08-22 NOTE — ED NOTES
Discharge instructions discussed with pt. Pt verbalized understanding. Pt discharged ambulatory with dario.

## 2019-08-22 NOTE — DISCHARGE INSTRUCTIONS
Your laboratory tests were very reassuring.  There is no evidence of any damage to your heart or kidneys from your high blood pressure.  Continue your medications, and call tomorrow to talk to your primary care doctor about your blood pressure.

## 2019-08-22 NOTE — PROGRESS NOTES
"Subjective:      Debra Stockton is a 79 y.o. female who presents with Blood Pressure Problem (x 2 days.  Pt. has had high blood pressure (172/90 something) for 2 days.  She is having pressure on chest, chills, palpitation and numbness on L side)        Patient is accompanied by her caretaker, who is helping as  throughout exam.     HPI   Patient presents to urgent care reporting a 2-3 day history of elevated blood pressure readings ranging in the 170s/100s. She has had HTN for many years and has been taking Losartan-HCTZ 100-12.5 for >20 years. Earlier today she developed some chest discomfort and pressure, that has now resolved. She also reports some blurry vision earlier today. She reports some tingling in her left cheek and arm, but states that's has been an intermittent problem for years after having surgery on the left ear. She denies palpitations, SOB, headaches, abdominal pain, back pain, nausea, diaphoresis, or dizziness. No personal history of cardiac disease or diabetes. She denies family history of cardiac disease. No history of smoking.     Review of Systems   Constitutional: Negative for chills and fever.   HENT: Negative for congestion.    Eyes: Positive for blurred vision.   Respiratory: Negative for shortness of breath.    Cardiovascular: Positive for chest pain. Negative for palpitations and leg swelling.   Gastrointestinal: Negative for abdominal pain, diarrhea, nausea and vomiting.   Genitourinary: Negative.    Musculoskeletal: Negative.    Skin: Negative for rash.   Neurological: Negative for dizziness.        Objective:     BP (!) 172/109   Pulse 89   Temp 36.7 °C (98.1 °F) (Temporal)   Resp 12   Ht 1.473 m (4' 10\")   Wt 58.3 kg (128 lb 9.6 oz)   SpO2 97%   BMI 26.88 kg/m²      Physical Exam   Constitutional: She is oriented to person, place, and time. She appears well-developed and well-nourished. No distress.   HENT:   Head: Normocephalic and atraumatic.   Eyes: Pupils are " equal, round, and reactive to light. Conjunctivae are normal.   Neck: Normal range of motion.   Cardiovascular: Normal rate, regular rhythm and normal heart sounds.   No murmur heard.  Pulmonary/Chest: Effort normal and breath sounds normal. No stridor. No respiratory distress. She has no wheezes.   Musculoskeletal: Normal range of motion.   Neurological: She is alert and oriented to person, place, and time. Coordination and gait normal.   Left sided facial droop   Skin: Skin is warm and dry. She is not diaphoretic.   Psychiatric: She has a normal mood and affect. Her behavior is normal.   Nursing note and vitals reviewed.         PMH:  has a past medical history of Arthritis, Dental disorder, Glaucoma, Heart burn, Hypertension, Indigestion, Other specified disorder of intestines, Unspecified cataract, Unspecified disorder of thyroid, Unspecified essential hypertension (10/30/2014), and Unspecified hypothyroidism (10/30/2014).  MEDS:   Current Outpatient Medications:   •  docusate sodium 100 MG Cap, Take 100 mg by mouth 2 Times a Day., Disp: 60 Cap, Rfl:   •  cyclobenzaprine (FLEXERIL) 5 MG tablet, Take 1 Tab by mouth 3 times a day as needed for Muscle Spasms., Disp: 30 Tab, Rfl: 0  •  Cholecalciferol (VITAMIN D PO), Take  by mouth every day., Disp: , Rfl:   •  CALCIUM PO, Take  by mouth every day., Disp: , Rfl:   •  levothyroxine (SYNTHROID) 75 MCG Tab, Take 75 mcg by mouth Every morning on an empty stomach., Disp: , Rfl:   •  COMBIGAN 0.2-0.5 % Solution, INSTILL ONE DROP IN BOTH EYES TWICE DAILY, Disp: , Rfl: 4  •  dorzolamide (TRUSOPT) 2 % Solution, Place  in right eye 2 Times a Day., Disp: , Rfl:   •  TRAVATAN Z 0.004 % Solution, Place  in both eyes 2 Times a Day., Disp: , Rfl:   •  meclizine (ANTIVERT) 25 MG Tab, Take 1 Tab by mouth 3 times a day as needed., Disp: 90 Tab, Rfl: 3  •  losartan-hydrochlorothiazide (HYZAAR) 100-12.5 MG per tablet, Take 1 Tab by mouth every day., Disp: , Rfl: 3  •   benzocaine-menthol (CEPACOL) 15-3.6 MG Lozenge, Spray 1 Lozenge in mouth/throat every 2 hours as needed (for sore throat)., Disp: , Rfl:   ALLERGIES: No Known Allergies  SURGHX:   Past Surgical History:   Procedure Laterality Date   • CERVICAL DISK AND FUSION ANTERIOR N/A 6/25/2018    Procedure: CERVICAL DISK AND FUSION ANTERIOR/ C4-7;  Surgeon: Lester Doyle M.D.;  Location: SURGERY [facility];  Service: Neurosurgery   • CORPECTOMY N/A 6/25/2018    Procedure: CORPECTOMY/ C5;  Surgeon: Lester Doyle M.D.;  Location: SURGERY [facility];  Service: Neurosurgery   • BLEPHAROPLASTY Left 9/7/2016    Procedure:  PERMANENT TEMPORAL PARTIAL TARSORRHAPHY;  Surgeon: Alphonso Montoya M.D.;  Location: SURGERY [facility];  Service:    • EYE FOREIGN BODY REMOVAL Left 1/29/2016    Procedure: EYE FOREIGN BODY REMOVAL  FOR: REMOVAL OF UPPER EYELID WEIGHT IMPLANT;  Surgeon: Alphonso Montoya M.D.;  Location: SURGERY SAME DAY [facility];  Service:    • LUMBAR LAMINECTOMY DISKECTOMY Left 7/17/2015    Procedure: LUMBAR LAMINECTOMY DISKECTOMY POSTERIOR L5-S1 MICRO;  Surgeon: Abel Salazar M.D.;  Location: SURGERY [facility];  Service:    • LID TIGHTENING  8/27/2014    Performed by Omer Servin M.D. at [facility]   • EYE FOREIGN BODY REMOVAL  5/20/2014    Performed by Omer Servin M.D. at [facility]   • OTHER  2004    left ear tumor removed   • APPENDECTOMY  1996   • OTHER  1952    tonsillectomy   • OTHER  2-3yrs ago    l eye  with multiple surgeries   • OTHER ABDOMINAL SURGERY  over 40 yrs ago    gavino wganer     SOCHX:  reports that she has never smoked. She has never used smokeless tobacco. She reports that she does not drink alcohol or use drugs.  FH: family history includes Cancer in her father; Diabetes in her brother, father, and mother.       Assessment/Plan:     1. Elevated blood pressure reading    2. Chest pain, unspecified type  - EKG - Clinic  Performed --> normal sinus rhythm, no evidence of acute ischemic changes or arrhythmias.     Recommend patient go directly to the ED for further evaluation and management. She is agreeable to this plan. Her caretaker will  her to the hospital. Patient left urgent care in stable condition.

## 2019-08-22 NOTE — ED TRIAGE NOTES
"Debra Stockton   79 y.o. female   Chief Complaint   Patient presents with   • Blood Pressure Problem     elevated throughout the day, high of 200 SBP in triage.    • Chest Pain     had it earlier today but it went away, none right now.    • Sent from Urgent Care     sent for HTN and chest pain      Pt amb to triage with steady gait for above complaint. Pt has slight left sided patient droop which is normal per patient from a tumor removal several years ago. Denies SOB or chest pain at this time.      Pt is alert and oriented, speaking in full sentences, follows commands and responds appropriately to questions. NAD. Resp are even and unlabored.   Pt placed in lobby. Pt educated on triage process. Pt encouraged to alert staff for any changes.    Pulse 90   Temp 36.4 °C (97.5 °F) (Temporal)   Resp 18   Ht 1.473 m (4' 10\")   Wt 58.3 kg (128 lb 9.6 oz)   SpO2 95%   BMI 26.88 kg/m²     "

## 2019-08-22 NOTE — ED PROVIDER NOTES
ED Provider Note    Scribed for Gerardo Zepeda M.D. by Gerardo Zepeda. 8/21/2019,  10:09 PM.    CHIEF COMPLAINT  Chief Complaint   Patient presents with   • Blood Pressure Problem     elevated throughout the day, high of 200 SBP in triage.    • Chest Pain     had it earlier today but it went away, none right now.    • Sent from Urgent Care     sent for HTN and chest pain       [facility]  Debra Stockton is a 79 y.o. female who presents to the Emergency Department because of high blood pressure, and transient chest pain.  Her pressure has been difficult to control for about the last 2 days, but she presented today after she was unable to see her primary care doctor, and was seen at urgent care, and then sent here.  She has a history of decades of hypertension, and has been taking losartan/HCTZ for greater than 20 years.  She had some chest tightness very early in the day for a few minutes, but this resolved before she was seen at urgent care.  She has had no new neurologic symptoms including weakness or numbness or blurry vision.  She has not had fevers or chills or nausea or vomiting, diaphoresis, exertional pain, or radiation of the transient chest discomfort.  She has no known history of coronary artery disease.  She is not diabetic.  She said that she had a complete evaluation by her primary care doctor with laboratory tests, including her thyroid, on the 14th of this month, and it was all normal.  That was exactly 1 week ago.      REVIEW OF SYSTEMS  See HPI for further details. All other systems are negative.     PAST MEDICAL HISTORY   has a past medical history of Arthritis, Dental disorder, Glaucoma, Heart burn, Hypertension, Indigestion, Other specified disorder of intestines, Unspecified cataract, Unspecified disorder of thyroid, Unspecified essential hypertension (10/30/2014), and Unspecified hypothyroidism (10/30/2014).    SOCIAL HISTORY  Social History     Tobacco Use   • Smoking status: Never Smoker  "  • Smokeless tobacco: Never Used   Substance and Sexual Activity   • Alcohol use: No   • Drug use: No   • Sexual activity: Not on file     Social History     Substance and Sexual Activity   Drug Use No       SURGICAL HISTORY   has a past surgical history that includes other abdominal surgery (over 40 yrs ago); eye foreign body removal (5/20/2014); appendectomy (1996); lid tightening (8/27/2014); lumbar laminectomy diskectomy (Left, 7/17/2015); other (2-3yrs ago); other (2004); other (1952); eye foreign body removal (Left, 1/29/2016); blepharoplasty (Left, 9/7/2016); cervical disk and fusion anterior (N/A, 6/25/2018); and corpectomy (N/A, 6/25/2018).    CURRENT MEDICATIONS  Home Medications     Reviewed by Fransisco Dubois R.N. (Registered Nurse) on 08/21/19 at 2004  Med List Status: <None>   Medication Last Dose Status   benzocaine-menthol (CEPACOL) 15-3.6 MG Lozenge  Active   CALCIUM PO  Active   Cholecalciferol (VITAMIN D PO)  Active   COMBIGAN 0.2-0.5 % Solution  Active   cyclobenzaprine (FLEXERIL) 5 MG tablet  Active   docusate sodium 100 MG Cap  Active   dorzolamide (TRUSOPT) 2 % Solution  Active   levothyroxine (SYNTHROID) 75 MCG Tab  Active   losartan-hydrochlorothiazide (HYZAAR) 100-12.5 MG per tablet  Active   meclizine (ANTIVERT) 25 MG Tab  Active   TRAVATAN Z 0.004 % Solution  Active                ALLERGIES  No Known Allergies    PHYSICAL EXAM  VITAL SIGNS: /77   Pulse 78   Temp 36.4 °C (97.5 °F) (Temporal)   Resp 18   Ht 1.473 m (4' 10\")   Wt 58.3 kg (128 lb 9.6 oz)   SpO2 94%   BMI 26.88 kg/m²   Pulse ox interpretation: I interpret this pulse ox as normal.  Constitutional: Alert in no apparent distress.  HENT: No signs of trauma, Bilateral external ears normal, Nose normal.   Eyes: Conjunctiva normal, Non-icteric.   Neck: Normal range of motion, Supple, No stridor.   Lymphatic: No lymphadenopathy noted.   Cardiovascular: Regular rate and rhythm, no murmurs.   Thorax & Lungs: Normal " breath sounds, No respiratory distress, No wheezing, No chest tenderness.   Abdomen: Bowel sounds normal, Soft, No tenderness, No masses, No pulsatile masses. No peritoneal signs.  Skin: Warm, Dry, No erythema, No rash.   Back: No midline bony tenderness.  Extremities: Intact distal pulses, No edema, No cyanosis.  Musculoskeletal: Good range of motion in all major joints. No or major deformities noted.   Neurologic: Alert , Normal motor function, Normal sensory function, No focal deficits noted.   Psychiatric: Affect normal, Judgment normal, Mood normal.     DIAGNOSTIC STUDIES / PROCEDURES    EKG  Interpreted by me    Rhythm:  Normal sinus rhythm   Axis: normal  Intervals: normal  Ectopy: none  Conduction: normal  ST Segments: no acute change  T Waves: no acute change  Q Waves: none  Compared to ECG 06/15/2018 14:16:40  No significant changes    LABS  Labs Reviewed   CBC WITH DIFFERENTIAL - Abnormal; Notable for the following components:       Result Value    MCHC 31.7 (*)     All other components within normal limits   COMP METABOLIC PANEL - Abnormal; Notable for the following components:    Sodium 134 (*)     Glucose 108 (*)     Globulin 4.0 (*)     All other components within normal limits   TROPONIN   TSH   PERIPHERAL SMEAR REVIEW   DIFFERENTIAL COMMENT   FREE THYROXINE   ESTIMATED GFR     All labs reviewed by me.    RADIOLOGY  DX-CHEST-PORTABLE (1 VIEW)   Final Result      No acute cardiopulmonary abnormality identified.        The radiologist's interpretation of all radiological studies have been reviewed by me.    COURSE & MEDICAL DECISION MAKING  Nursing notes, VS, PMSFHx reviewed in chart.     10:09 PM Patient seen and examined at bedside. Differential diagnosis includes but is not limited to hypertensive emergency versus asymptomatic hypertension.  Subjectively, she is not having any symptoms, but we will screen for end organ damage.  There is no reason for a head CT, as she is not showing any signs of  hypertensive encephalopathy.  Ordered for screening laboratory tests including troponin, TSH, T4, EKG, and chest x-ray to evaluate. Patient will be treated with 10 mg of IV labetalol for her symptoms.     This patient was reassessed twice at the bedside, and responded very well to labetalol, with a systolic blood pressure in the 150s, and no chest pain.  As above, she has not had chest pain for many hours.  The rest of her evaluation was unremarkable.  She appears to have asymptomatic hypertension.  I discussed this with her and with her daughter at the bedside.  They will call tomorrow to schedule follow-up with the patient's primary care doctor.  We discussed return precautions for confusion or recurrent chest pain or other concerns.     The patient will return for new or worsening symptoms and is stable at the time of discharge.    The patient is referred to a primary physician for blood pressure management, diabetic screening, and for all other preventative health concerns.    DISPOSITION:  Patient will be discharged home in stable condition.    FOLLOW UP:  Herson Garcia M.D.  [address] [place] [address]  [phone]    Call in 1 day      OUTPATIENT MEDICATIONS:  Discharge Medication List as of 8/21/2019 11:26 PM        FINAL IMPRESSION  1. Elevated blood pressure reading

## 2019-09-18 ENCOUNTER — APPOINTMENT (OUTPATIENT)
Dept: PHYSICAL THERAPY | Facility: REHABILITATION | Age: 79
End: 2019-09-18
Attending: NURSE PRACTITIONER
Payer: MEDICARE

## 2019-09-20 ENCOUNTER — APPOINTMENT (OUTPATIENT)
Dept: PHYSICAL THERAPY | Facility: REHABILITATION | Age: 79
End: 2019-09-20
Payer: MEDICARE

## 2019-09-25 ENCOUNTER — PHYSICAL THERAPY (OUTPATIENT)
Dept: PHYSICAL THERAPY | Facility: REHABILITATION | Age: 79
End: 2019-09-25
Attending: NURSE PRACTITIONER
Payer: MEDICARE

## 2019-09-25 ENCOUNTER — APPOINTMENT (OUTPATIENT)
Dept: PHYSICAL THERAPY | Facility: REHABILITATION | Age: 79
End: 2019-09-25
Payer: MEDICARE

## 2019-09-25 DIAGNOSIS — R26.89 OTHER ABNORMALITIES OF GAIT AND MOBILITY: ICD-10-CM

## 2019-09-25 PROCEDURE — 97163 PT EVAL HIGH COMPLEX 45 MIN: CPT

## 2019-09-25 PROCEDURE — 97110 THERAPEUTIC EXERCISES: CPT

## 2019-09-25 NOTE — OP THERAPY EVALUATION
Outpatient Physical Therapy  INITIAL EVALUATION    [facility] Physical [address].  [address]  [place] [address]  Phone:  [phone]  Fax:  [phone]    Date of Evaluation: 09/25/2019    Patient: Debra Stockton  YOB: 1940  MRN: [id number]     Referring Provider: ALYSA Salmeron  [address] [place] [address]   Referring Diagnosis Other abnormalities of gait and mobility [R26.89]     Time Calculation  Start time: 0900  Stop time: 1015 Time Calculation (min): 75 minutes       Physical Therapy Occurrence Codes    Date of onset of impairment:  9/25/15   Date physical therapy care plan established or reviewed:  9/25/19   Date physical therapy treatment started:  9/25/19          Chief Complaint: Loss Of Balance    Visit Diagnoses     ICD-10-CM   1. 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 Has a surgical history of: Cervical disk and anterior fusion (2018), corpectomy (2018), blepharoplasty L (2016), L eye foreign body removal (2016), lumbar laminectomy discectomy L (2015), lid tightening (2014), eye foreign body removal (2014), L ear tumor removal (2004), appendectomy (1996), tonsillectomy (1952), multiple eye surgeries (2-3 years ago), and appy choley abdominal surgery (>40 years ago).     Pt presents to therapy with complaints of balance impairments  "for several years after a surgery in the middle ear (1998) which occurred in [place]. Reports she underwent surgery due to c/o significant dizziness which have greatly improved since this surgery. Pt endorses dizziness occasionally but denies \"spinning or feeling as if she's on the boat.\" \"I walk normally and then look to the right and then I have to grab onto something, but I wouldn't say I fall a lot.\" Pt endorses a hx of 3-4 falls; last was a 1.5 years ago. Pt reports she \"doesn't fall\" but always \"grabs onto something.\"    Currently denies changes in bowel and bladder, significant weight changes, chills/night sweats, nausea and vomiting, and unexplained fatigue. Pt consents to evaluation and treatment today.     Pain:     Progression:  Stable    Pain Comments::  Aggravating: Ambulating with head turns, stairs.       Relieving: use of Walker  Social Support:     Lives in:  Apartment (elevator)    Lives with:  Alone  Diagnostic Tests:     Diagnostic Tests Comments:  None  Treatments:     Treatment Comments:  Previous physical therapy at different location to address her balance which \"helped a lot.\" Pt unable to describe therex/interventions previously completed.       Activities of Daily Living:     Patient reported ADL status: Pt reports that she receives assistance from a woman 2-3 hours/day; assists with cleaning, grocery shopping, and supervision with toileting/bathing. Has current DME: shower chair and grab bars.  Pt currently uses rollator in the community. Pt reports she does not furniture walk at home.     Pt reports \"fear to go outside\" or \"not use her walker\" due to her LOB.   Patient Goals:     Other patient goals:  Stop use of walker, stop being fearful to walk       Past Medical History:   Diagnosis Date   • Arthritis    • Dental disorder     dentures Upper and lower   • Glaucoma    • Heart burn    • Hypertension    • Indigestion    • Other specified disorder of intestines     constipation   • " Unspecified cataract     dylan IOL   • Unspecified disorder of thyroid    • Unspecified essential hypertension 10/30/2014   • Unspecified hypothyroidism 10/30/2014     Past Surgical History:   Procedure Laterality Date   • CERVICAL DISK AND FUSION ANTERIOR N/A 6/25/2018    Procedure: CERVICAL DISK AND FUSION ANTERIOR/ C4-7;  Surgeon: Lester Dolye M.D.;  Location: SURGERY [facility];  Service: Neurosurgery   • CORPECTOMY N/A 6/25/2018    Procedure: CORPECTOMY/ C5;  Surgeon: Lester Doyle M.D.;  Location: SURGERY [facility];  Service: Neurosurgery   • BLEPHAROPLASTY Left 9/7/2016    Procedure:  PERMANENT TEMPORAL PARTIAL TARSORRHAPHY;  Surgeon: Alphonso Montoya M.D.;  Location: SURGERY [facility];  Service:    • EYE FOREIGN BODY REMOVAL Left 1/29/2016    Procedure: EYE FOREIGN BODY REMOVAL  FOR: REMOVAL OF UPPER EYELID WEIGHT IMPLANT;  Surgeon: Alphonso Montoya M.D.;  Location: SURGERY SAME DAY [facility];  Service:    • LUMBAR LAMINECTOMY DISKECTOMY Left 7/17/2015    Procedure: LUMBAR LAMINECTOMY DISKECTOMY POSTERIOR L5-S1 MICRO;  Surgeon: Abel Salazar M.D.;  Location: SURGERY [facility];  Service:    • LID TIGHTENING  8/27/2014    Performed by Omer Servin M.D. at [facility]   • EYE FOREIGN BODY REMOVAL  5/20/2014    Performed by Omer Servin M.D. at [facility]   • OTHER  2004    left ear tumor removed   • APPENDECTOMY  1996   • OTHER  1952    tonsillectomy   • OTHER  2-3yrs ago    l eye  with multiple surgeries   • OTHER ABDOMINAL SURGERY  over 40 yrs ago    gavino wagner     Social History     Tobacco Use   • Smoking status: Never Smoker   • Smokeless tobacco: Never Used   Substance Use Topics   • Alcohol use: No     Family and Occupational History     Socioeconomic History   • Marital status:      Spouse name: Not on file   • Number of children: Not on file   • Years of education: Not on file   • Highest education level: Not on  file   Occupational History   • Not on file       Objective     Observations     Additional Observation Details  L eye impairments; 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    Postural Observations  Seated posture: fair        Neurological Testing     Reflexes   Left   Patellar (L4): normal (2+)  Achilles (S1): normal (2+)    Right   Patellar (L4): normal (2+)  Achilles (S1): normal (2+)    Dermatome testing   Lumbar (left)   All left lumbar dermatomes intact    Lumbar (right)   All right lumbar dermatomes intact    Additional Neurological Details  Dermatomes: B plantar surfaces of feet intact     Active Range of Motion     Additional Active Range of Motion Details  UE ROM: WFL B (minor R shoulder discomfort with R GH elevation noted per pt)    LE ROM: WFL B    Strength:      Additional Strength Details  Hip flexion: B 4-/5  Hip abduction: B 3+/5  Hip adduction: B 3+/5  Knee extension: B 4-/5  Ankle PF: B 4/5  Ankle DF: 4/5        Functional Assessment     Comments  Gait: Pt ambulates with rollator; able to stand statically without UE or use of AD. Difficulty with turns and dynamic movements: requires rollator.  Transitions: Pt requires Tyler (hand hold assist) or AD with SBA for balance with sit<>stand.   Supine<>sit and sit>stand: Pt c/o minor prolonged dizziness >2 min.    Ramirez Balance Assessment:  Sitting to standing: able to stand I without using hands  Standing unsupported: able to stand 30 sec unsupported  Sitting with back unsupported but feet supported on floor: able to sit safely and securely 2 min  Standing to sitting: controls decent using hands  Transfers: needs one person to assist (or AD)  Standing unsupported with eyes closed: able to stand 10 sec with supervision; increased sway; c/o dizziness  Standing unsupported with feet together: able to stand I for 1 min with supervision; increased sway; c/o dizziness  Reaching forward with outstretched arm while standing: reaches forward but needs  supervision  Turning to look behind over shoulder: turns sideways only but maintains balance  Turn 360 degrees: needs close supervision or verbal cuing  Standing unsupported one foot in front: loses balance while stepping and standing (pt required PT's hand in order to assume correct positioning); increased sway; c/o dizziness.    Total: 24/44 Ramirez balance scale  (#9, 12, and 14 not tested due to time constraints and safety)      General Comments     Spine Comments   Neuro screen: Difficult to assess secondary to L eye impairments/deformities  Impaired saccades R eye: all directions  Vision screen: complete blindness L eye per pt            Therapeutic Exercises (CPT 47607):     1. Ramirez Balance Assessment, x25min, Pt had difficulty understanding requests; did not complete all sections due to time constraints and safety      Time-based treatments/modalities:  Therapeutic exercise minutes (CPT 07625): 15 minutes       Assessment, Response and Plan:   Impairments: impaired balance, impaired physical strength, lacks appropriate home exercise program, limited ADL's and safety issue    Other Impairments:  Dizziness  Assessment details:  Patient is a 79 year old female with a complex medical hx (see above). Pt presents to therapy with complaints of balance impairments for several years after a surgery in the middle ear (1998) which occurred in [place]. Reports she underwent surgery due to c/o significant dizziness which have greatly improved since this surgery. Pt endorses dizziness occasionally and a hx of 3-4 falls. Pt would benefit from skilled physical therapy with vestibular emphasis in order to address above deficits and primary impairments of inner ear and visual deficits  in order to improve balance, dizziness, QOL, and return to reported ADL's. Pt is a fall risk and requires SBA with AD to Tyler depending on task.     Barriers to therapy:  Language, comprehension, vision and lack of social support  Other barriers to  therapy:  Pt lives alone  Prognosis: fair    Goals:   Short Term Goals:   1. Pt will be independent with written HEP.    Short term goal time span:  2-4 weeks      Long Term Goals:    1. Pt will be independent with written HEP.  2. Pt will have an improved Ramirez balance score of 25% (eval:Total: 24/44 Ramirez balance scale  (#9, 12, and 14 not tested due to time constraints and safety)  3. Pt will amb with appropriate AD and dual tasking (head turns) without LOB in order to improve balance and QOL.  4. Pt will perform transitions sit<>stand x10 safely with modifications but no supervision required in order to increase pt safety in the home.    Long term goal time span:  6-8 weeks    Plan:   Therapy options:  Physical therapy treatment to continue  Planned therapy interventions:  Manual Therapy (CPT 94388), Vestibular Evaluation (CPT 46222), Neuromuscular Re-education (CPT 29815), Therapeutic Exercise (CPT 54326) and Therapeutic Activities (CPT 85600)  Other planned therapy interventions:  Vestibular rehabilitiation  Frequency:  2x week  Duration in weeks:  8  Discussed with:  Patient  Plan details:  UPOC: 11/22/19        Functional Assessment Used        Aldo Cao, PT, DPT, PWR!Moves    Referring provider co-signature:  I have reviewed this plan of care and my co-signature certifies the need for services.  Certification Dates:   From 09/25/19   To 11/22/19    Physician Signature: ________________________________ Date: ______________

## 2019-09-26 ASSESSMENT — ACTIVITIES OF DAILY LIVING (ADL): POOR_BALANCE: 1

## 2019-09-27 ENCOUNTER — APPOINTMENT (OUTPATIENT)
Dept: PHYSICAL THERAPY | Facility: REHABILITATION | Age: 79
End: 2019-09-27
Payer: MEDICARE

## 2019-09-27 ENCOUNTER — APPOINTMENT (OUTPATIENT)
Dept: PHYSICAL THERAPY | Facility: REHABILITATION | Age: 79
End: 2019-09-27
Attending: NURSE PRACTITIONER
Payer: MEDICARE

## 2019-10-02 ENCOUNTER — APPOINTMENT (OUTPATIENT)
Dept: PHYSICAL THERAPY | Facility: REHABILITATION | Age: 79
End: 2019-10-02
Payer: MEDICARE

## 2019-10-02 ENCOUNTER — APPOINTMENT (OUTPATIENT)
Dept: PHYSICAL THERAPY | Facility: REHABILITATION | Age: 79
End: 2019-10-02
Attending: NURSE PRACTITIONER
Payer: MEDICARE

## 2019-10-03 ENCOUNTER — PHYSICAL THERAPY (OUTPATIENT)
Dept: PHYSICAL THERAPY | Facility: REHABILITATION | Age: 79
End: 2019-10-03
Attending: NURSE PRACTITIONER
Payer: MEDICARE

## 2019-10-03 DIAGNOSIS — R26.89 OTHER ABNORMALITIES OF GAIT AND MOBILITY: ICD-10-CM

## 2019-10-03 PROCEDURE — 97112 NEUROMUSCULAR REEDUCATION: CPT

## 2019-10-03 NOTE — OP THERAPY DAILY TREATMENT
"  Outpatient Physical Therapy  DAILY TREATMENT     [facility] Physical [address].  [address]  [place] [address]  Phone:  [phone]  Fax:  [phone]    Date: 10/03/2019    Patient: Debra Stockton  YOB: 1940  MRN: [id number]     Time Calculation  Start time: 1259  Stop time: 1330 Time Calculation (min): 31 minutes       Chief Complaint: Vertigo and Loss Of Balance    Visit #: 2    SUBJECTIVE:  : #[id number] Melinda  Pt presents for her further evaluation of her dizziness.  States this has been ongoing since her middle ear surgery done in 1998.  The level of intensity has been the same for many years, so it doesn't concern her much.  \"It is a moment that I feel dizzy, but if I sit down, it goes away.\"  Never a sensation of fainting.  Worsens when up walking or with a quick STS.  Reports increased reliance on 4WW since her neck procedure 1 yr ago.  Concerns mostly with feeling more confident walking without the 4WW    OBJECTIVE:      Therapeutic Treatments and Modalities:     Therapeutic Treatment and Modalities Summary:   - Standing balance in corner:  EC standard x 1 min  EC ankle sways 2x1 min  Heel raises with light UEs: x 20  Marching: without UEs x 1 min  Standing hip abd: x 20 on ea side    Breaks required bw each task due to limited standing endurance.     Time-based treatments/modalities:  Neuromusc re-ed, balance, coor, post minutes (CPT 61930): 30 minutes       ASSESSMENT:   Response to treatment: Pt with evidence of chronic vestibular hypofunction; however, current imbalance seems to be more related to LE weakness and decreased confidence.  Would benefit from continuation of PT services to general balance progressions and strength/endurance training.     PLAN/RECOMMENDATIONS:   Plan for treatment: therapy treatment to continue next visit.  Planned interventions for next visit: continue with current treatment.       "

## 2019-10-04 ENCOUNTER — APPOINTMENT (OUTPATIENT)
Dept: PHYSICAL THERAPY | Facility: REHABILITATION | Age: 79
End: 2019-10-04
Payer: MEDICARE

## 2019-10-04 ENCOUNTER — APPOINTMENT (OUTPATIENT)
Dept: PHYSICAL THERAPY | Facility: REHABILITATION | Age: 79
End: 2019-10-04
Attending: NURSE PRACTITIONER
Payer: MEDICARE

## 2019-10-08 ENCOUNTER — APPOINTMENT (OUTPATIENT)
Dept: PHYSICAL THERAPY | Facility: REHABILITATION | Age: 79
End: 2019-10-08
Payer: MEDICARE

## 2019-10-09 ENCOUNTER — PHYSICAL THERAPY (OUTPATIENT)
Dept: PHYSICAL THERAPY | Facility: REHABILITATION | Age: 79
End: 2019-10-09
Attending: NURSE PRACTITIONER
Payer: MEDICARE

## 2019-10-09 DIAGNOSIS — R26.89 OTHER ABNORMALITIES OF GAIT AND MOBILITY: ICD-10-CM

## 2019-10-09 PROCEDURE — 97112 NEUROMUSCULAR REEDUCATION: CPT

## 2019-10-09 PROCEDURE — 97110 THERAPEUTIC EXERCISES: CPT

## 2019-10-09 NOTE — OP THERAPY DAILY TREATMENT
"  Outpatient Physical Therapy  DAILY TREATMENT     [facility] Physical [address].  [address]  [place] [address]  Phone:  [phone]  Fax:  [phone]    Date: 10/09/2019    Patient: Debra Stockton  YOB: 1940  MRN: [id number]     Time Calculation  Start time: 1026  Stop time: 1101 Time Calculation (min): 35 minutes       Chief Complaint: Loss Of Balance    Visit #: 3    SUBJECTIVE:  \"I'm feeling a little better.\"  Reports she has been doing the HEP.     OBJECTIVE:      Therapeutic Exercises (CPT 74041):     1. NuStep, L2 x 5 min    2. Shuttle, Squat: 5C bilat x 20, SL 3C x 20 ea    3. Bridge, standard x 20    4. SL clam, x 20 ea side    Therapeutic Treatments and Modalities:     Therapeutic Treatment and Modalities Summary:   - Standing balance:  EC standard x 1 min  EC ankle sways x1 min  Heel raises with light UEs: x 20  Marching: without UEs x 1 min  Airex STS x 5 with EO  Airex balance with HTs: horiz and vert x 10 ea  AIrex balance with EC, Roger for balance.  Best hold= 4 sec    Time-based treatments/modalities:  Therapeutic exercise minutes (CPT 16039): 15 minutes  Neuromusc re-ed, balance, coor, post minutes (CPT 12177): 20 minutes       ASSESSMENT:   Response to treatment: Pt very pleasant and participatory.  Reporting mild fatigue upon completion, but therex could be advanced next visit.  Apprehensive during tasks on airex, would benefit from advancements on complaint surfaces as well.     PLAN/RECOMMENDATIONS:   Plan for treatment: therapy treatment to continue next visit.  Planned interventions for next visit: continue with current treatment.        "

## 2019-10-11 ENCOUNTER — APPOINTMENT (OUTPATIENT)
Dept: PHYSICAL THERAPY | Facility: REHABILITATION | Age: 79
End: 2019-10-11
Payer: MEDICARE

## 2019-10-11 ENCOUNTER — APPOINTMENT (OUTPATIENT)
Dept: PHYSICAL THERAPY | Facility: REHABILITATION | Age: 79
End: 2019-10-11
Attending: NURSE PRACTITIONER
Payer: MEDICARE

## 2019-10-14 ENCOUNTER — APPOINTMENT (OUTPATIENT)
Dept: PHYSICAL THERAPY | Facility: REHABILITATION | Age: 79
End: 2019-10-14
Attending: NURSE PRACTITIONER
Payer: MEDICARE

## 2019-10-15 ENCOUNTER — PHYSICAL THERAPY (OUTPATIENT)
Dept: PHYSICAL THERAPY | Facility: REHABILITATION | Age: 79
End: 2019-10-15
Attending: NURSE PRACTITIONER
Payer: MEDICARE

## 2019-10-15 DIAGNOSIS — R26.89 OTHER ABNORMALITIES OF GAIT AND MOBILITY: ICD-10-CM

## 2019-10-15 PROCEDURE — 97112 NEUROMUSCULAR REEDUCATION: CPT

## 2019-10-15 PROCEDURE — 97110 THERAPEUTIC EXERCISES: CPT

## 2019-10-15 NOTE — OP THERAPY DAILY TREATMENT
"  Outpatient Physical Therapy  DAILY TREATMENT     [facility] Physical [address]  [address]  [place] [address]  Phone:  [phone]  Fax:  [phone]    Date: 10/15/2019    Patient: Debra Stokcton  YOB: 1940  MRN: [id number]     Time Calculation  Start time: 1328  Stop time: 1400 Time Calculation (min): 32 minutes       Chief Complaint: Loss Of Balance    Visit #: 4    SUBJECTIVE:  No new complaints    OBJECTIVE:      Therapeutic Exercises (CPT 41089):     1. NuStep, L2 x 6 min    2. Shuttle, Squat: 5C bilat x 20, SL 3C x 20 ea    3. Bridge, standard x 20    Therapeutic Treatments and Modalities:     Therapeutic Treatment and Modalities Summary:   - Standing balance:  Heel raises with light UEs: x 20  Marching: without UEs x 1 min  Airex STS x 10 with EO  Airex balance with HTs: horiz and vert x 10 ea  AIrex balance with EC, best attempt= 10\"  Step taps 4\" x 20 ea side  Lateral walking x 1 lap, HHA  Step over walking x 1 lap, HHA  4 square stepping x 10 ea way, HHA    Time-based treatments/modalities:  Therapeutic exercise minutes (CPT 69216): 10 minutes  Neuromusc re-ed, balance, coor, post minutes (CPT 73742): 20 minutes       ASSESSMENT:   Response to treatment: Apprehension with movement away from the 4WW, but participates with encouragement.  Highly dependent on compensation with UE proprioception.     PLAN/RECOMMENDATIONS:   Plan for treatment: therapy treatment to continue next visit.  Planned interventions for next visit: continue with current treatment. VRT and balance progressions, working away from UE assist as able.  Improve stepping strategy.        "

## 2019-10-16 ENCOUNTER — APPOINTMENT (OUTPATIENT)
Dept: PHYSICAL THERAPY | Facility: REHABILITATION | Age: 79
End: 2019-10-16
Attending: NURSE PRACTITIONER
Payer: MEDICARE

## 2019-10-17 ENCOUNTER — APPOINTMENT (OUTPATIENT)
Dept: PHYSICAL THERAPY | Facility: REHABILITATION | Age: 79
End: 2019-10-17
Attending: NURSE PRACTITIONER
Payer: MEDICARE

## 2019-10-18 ENCOUNTER — PHYSICAL THERAPY (OUTPATIENT)
Dept: PHYSICAL THERAPY | Facility: REHABILITATION | Age: 79
End: 2019-10-18
Attending: NURSE PRACTITIONER
Payer: MEDICARE

## 2019-10-18 DIAGNOSIS — R26.89 OTHER ABNORMALITIES OF GAIT AND MOBILITY: ICD-10-CM

## 2019-10-18 PROCEDURE — 97112 NEUROMUSCULAR REEDUCATION: CPT

## 2019-10-18 NOTE — OP THERAPY DAILY TREATMENT
"  Outpatient Physical Therapy  DAILY TREATMENT     [facility] Physical [address].  [address]  [place] [address]  Phone:  [phone]  Fax:  [phone]    Date: 10/18/2019    Patient: Debra Stockton  YOB: 1940  MRN: [id number]     Time Calculation  Start time: 1330  Stop time: 1400 Time Calculation (min): 30 minutes       Chief Complaint: No chief complaint on file.    Visit #: 5    SUBJECTIVE:  \"I'm slowly feeling better.\"    OBJECTIVE:      Therapeutic Exercises (CPT 49811):     1. NuStep, L3 x 7 min    2. Shuttle, Squat: 5C bilat x 20, SL 4C x 20 ea    3. Bridge, standard x 20    Therapeutic Treatments and Modalities:     Therapeutic Treatment and Modalities Summary:   - Standing balance:  Step ups: 1 hand, 6\" x 10 ea side  Airex balance with HTs: horiz and vert x 10 ea  AIrex marching  Rocker board: x 2 min with Roger balance, lateral  1/2 roller step over: f/b and lateral with HHA      Time-based treatments/modalities:  Neuromusc re-ed, balance, coor, post minutes (CPT 63026): 30 minutes       ASSESSMENT:   Response to treatment: Needs encouragement to decrease use of UEs during balance challenges.  Improved stability to mild sway when statically standing on the airex.     PLAN/RECOMMENDATIONS:   Plan for treatment: therapy treatment to continue next visit.  Planned interventions for next visit: continue with current treatment. Cont balance progression, obstacles, tilt board.        "

## 2019-10-22 ENCOUNTER — PHYSICAL THERAPY (OUTPATIENT)
Dept: PHYSICAL THERAPY | Facility: REHABILITATION | Age: 79
End: 2019-10-22
Attending: NURSE PRACTITIONER
Payer: MEDICARE

## 2019-10-22 DIAGNOSIS — R26.89 OTHER ABNORMALITIES OF GAIT AND MOBILITY: ICD-10-CM

## 2019-10-22 PROCEDURE — 97112 NEUROMUSCULAR REEDUCATION: CPT

## 2019-10-22 NOTE — OP THERAPY DAILY TREATMENT
"  Outpatient Physical Therapy  DAILY TREATMENT     [facility] Physical Therapy [address].  [address]  [place] [address]  Phone:  [phone]  Fax:  [phone]    Date: 10/22/2019    Patient: Debra Stockton  YOB: 1940  MRN: [id number]     Time Calculation  Start time: 1300  Stop time: 1330 Time Calculation (min): 30 minutes       Chief Complaint: Loss Of Balance    Visit #: 6    SUBJECTIVE:  No new complaints.  The HEP is going well per pt. Doesn't need to use the FWW around her home.     OBJECTIVE:      Therapeutic Exercises (CPT 22058):     1. NuStep, L3 x 10 min    Therapeutic Treatments and Modalities:     Therapeutic Treatment and Modalities Summary:   - Standing balance:  Step taps: 4\" x 10 ea side  Step hold: 4\" 2x30\" ea  Airex balance with HTs: horiz and vert x 10 ea  AIrex STS  Airex EC x 30\"  Rocker board: x 2 min with Roger balance, lateral  1/2 tandem balance, x 30\" ea  Attempts at SLS with light UEs- poor confidence limits attempt      Time-based treatments/modalities:  Neuromusc re-ed, balance, coor, post minutes (CPT 31477): 30 minutes       ASSESSMENT:   Response to treatment: Increased fatigue from nustep resulted in decreased confidence during balance tasks despite showing improving control.  Does better with back to plinth so that she is less inclined to hold the bars in front of her.     PLAN/RECOMMENDATIONS:   Plan for treatment: therapy treatment to continue next visit.  Planned interventions for next visit: continue with current treatment.       "

## 2019-10-23 ENCOUNTER — PHYSICAL THERAPY (OUTPATIENT)
Dept: PHYSICAL THERAPY | Facility: REHABILITATION | Age: 79
End: 2019-10-23
Attending: NURSE PRACTITIONER
Payer: MEDICARE

## 2019-10-23 DIAGNOSIS — R26.89 OTHER ABNORMALITIES OF GAIT AND MOBILITY: ICD-10-CM

## 2019-10-23 PROCEDURE — 97112 NEUROMUSCULAR REEDUCATION: CPT

## 2019-10-23 NOTE — OP THERAPY DAILY TREATMENT
"  Outpatient Physical Therapy  DAILY TREATMENT     [facility] Physical [address].  [address]  [place] [address]  Phone:  [phone]  Fax:  [phone]    Date: 10/23/2019    Patient: Debra Stockton  YOB: 1940  MRN: [id number]     Time Calculation  Start time: 1059  Stop time: 1126 Time Calculation (min): 27 minutes       Chief Complaint: Loss Of Balance    Visit #: 7    SUBJECTIVE:  No new complaints.     OBJECTIVE:      Therapeutic Exercises (CPT 41677):     1. NuStep, L3 x 5 min    Therapeutic Treatments and Modalities:     Therapeutic Treatment and Modalities Summary:   - Standing balance:  Step taps: 4\" x 10 ea side  Step hold: 4\" 2x30\" ea  Step up: 4\" with 1 dowel for support, x 10 reps ea (req sig encouragement, translation)  Airex balance with HTs: horiz and vert x 10 ea  AIrex STS  Airex EC x 30\"    Time-based treatments/modalities:  Therapeutic exercise minutes (CPT 49207): 5 minutes  Neuromusc re-ed, balance, coor, post minutes (CPT 14447): 23 minutes       ASSESSMENT:   Response to treatment: Pt continues to defer to UE support/proprio input even when not needed due to fear of falling and lack of confidence.  She acknowledges this today.  Follow conversation regarding her confidence, was able to complete step up without PT manual assist (just SBA).    PLAN/RECOMMENDATIONS:   Plan for treatment: therapy treatment to continue next visit.  Planned interventions for next visit: continue with current treatment. Cont balance progressions to optimize confidence.  Reassess measures next week to determine need for continue vs d/c to HEP       "

## 2019-10-29 ENCOUNTER — PHYSICAL THERAPY (OUTPATIENT)
Dept: PHYSICAL THERAPY | Facility: REHABILITATION | Age: 79
End: 2019-10-29
Attending: NURSE PRACTITIONER
Payer: MEDICARE

## 2019-10-29 DIAGNOSIS — R26.89 OTHER ABNORMALITIES OF GAIT AND MOBILITY: ICD-10-CM

## 2019-10-29 PROCEDURE — 97112 NEUROMUSCULAR REEDUCATION: CPT

## 2019-10-29 NOTE — OP THERAPY DAILY TREATMENT
"  Outpatient Physical Therapy  DAILY TREATMENT     [facility] Physical [address].  [address]  [place] [address]  Phone:  [phone]  Fax:  [phone]    Date: 10/29/2019    Patient: Debra Stockton  YOB: 1940  MRN: [id number]     Time Calculation  Start time: 1303  Stop time: 1335 Time Calculation (min): 32 minutes       Chief Complaint: Loss Of Balance    Visit #: 8    SUBJECTIVE:  No new complaints.     OBJECTIVE:      Therapeutic Exercises (CPT 35478):     1. NuStep, L3 x 5 min    Therapeutic Treatments and Modalities:     Therapeutic Treatment and Modalities Summary:   -Gait: 2x100 feet with 1 walking stick and SBA.  Cuing for narrowed stance to standard, increased velocity.   -Step ups 6\" with 1 finger support, x 15 ea side  - 1/2 foam roller step overs: fwd and lateral with 1 walking stick.  X5 laps ea  - Cone taps into abd: x 3 laps ea with 1 walking stick  - Cone  without support: x 10    Time-based treatments/modalities:  Therapeutic exercise minutes (CPT 20741): 5 minutes  Neuromusc re-ed, balance, coor, post minutes (CPT 96199): 25 minutes       ASSESSMENT:   Response to treatment: Pt requiring less encouragement to trust herself during balance work.  Reaches less for PT support. Still ambulates with shuffled pattern and wide AVIVA if no AD is present.     PLAN/RECOMMENDATIONS:   Plan for treatment: therapy treatment to continue next visit.  Planned interventions for next visit: continue with current treatment. Reassess balance measures next visit     "

## 2019-10-31 ENCOUNTER — PHYSICAL THERAPY (OUTPATIENT)
Dept: PHYSICAL THERAPY | Facility: REHABILITATION | Age: 79
End: 2019-10-31
Attending: NURSE PRACTITIONER
Payer: MEDICARE

## 2019-10-31 DIAGNOSIS — R26.89 OTHER ABNORMALITIES OF GAIT AND MOBILITY: ICD-10-CM

## 2019-10-31 PROCEDURE — 97112 NEUROMUSCULAR REEDUCATION: CPT

## 2019-10-31 NOTE — OP THERAPY DAILY TREATMENT
Outpatient Physical Therapy  DAILY TREATMENT     [facility] Physical [address].  [address]  [place] [address]  Phone:  [phone]  Fax:  [phone]    Date: 10/31/2019    Patient: Debra Stockton  YOB: 1940  MRN: [id number]     Time Calculation  Start time: 1300  Stop time: 1329 Time Calculation (min): 29 minutes       Chief Complaint: Loss Of Balance    Visit #: 9    SUBJECTIVE:  Happy with progress so far.  Walks home and familiar areas without AD often.  Utilizes 4WW for long distances and new environments.     OBJECTIVE:  Current objective measures: MCTSIB: WNL cond 1 and 2, unwilling to perform true effort on cond 3 and 4 due to fear of falling.         Therapeutic Treatments and Modalities:     1. Neuromuscular Re-education (CPT 72559)    Therapeutic Treatment and Modalities Summary:   -MCTSIB performed, see above  - Review of HEP.  How fear of falling limits us and how to enhance experiences to help manage the fear.     Time-based treatments/modalities:  Neuromusc re-ed, balance, coor, post minutes (CPT 62098): 29 minutes       ASSESSMENT:   Response to treatment: See D/C note    PLAN/RECOMMENDATIONS:   Plan for treatment: no further treatment needed.

## 2019-10-31 NOTE — OP THERAPY DISCHARGE SUMMARY
Outpatient Physical Therapy  DISCHARGE SUMMARY NOTE      [facility] Physical Therapy [address].  [address]  [place] [address]  Phone:  [phone]  Fax:  [phone]    Date of Visit: 10/31/2019    Patient: Debra Stockton  YOB: 1940  MRN: [id number]     Referring Provider: ALYSA Salmeron  [address] [place] [address]   Referring Diagnosis Other abnormalities of gait and mobility [R26.89]     Physical Therapy Occurrence Codes    Date of onset of impairment:  9/25/15   Date physical therapy care plan established or reviewed:  9/25/19   Date physical therapy treatment started:  9/25/19          Functional Assessment Used        Your patient is being discharged from Physical Therapy with the following comments:   · Goals met    Comments:  Ms. Stockton was seen for 9 PT sessions treating her dizziness and imbalance.  Treatment focused on progressive balance challenges, VRT and general strength/endurance training.  Pt progressed well overall, with all goals met at this time.  Her balance on firm surfaces is WNL on MCTSIB and she is able to navigate familiar environments without an AD with low risk of falls.  Pt continues to be limited by her visual impairments and intense fear of falling when in new environments or on unsteady surfaces.  Panic in these situations results in high fall risk due to lack of utilization of typical balance strategies.  She is encouraged to cont use of 4WW for this situations. Otherwise is indep with HEP and appropriate for d/c at this time.     Clemencia Barrett, PT, DPT    Date: 10/31/2019

## 2019-11-05 ENCOUNTER — HOSPITAL ENCOUNTER (OUTPATIENT)
Dept: LAB | Facility: MEDICAL CENTER | Age: 79
End: 2019-11-05
Attending: FAMILY MEDICINE
Payer: MEDICARE

## 2019-11-05 LAB
ALBUMIN SERPL BCP-MCNC: 4.2 G/DL (ref 3.2–4.9)
ALBUMIN/GLOB SERPL: 1 G/DL
ALP SERPL-CCNC: 99 U/L (ref 30–99)
ALT SERPL-CCNC: 17 U/L (ref 2–50)
ANION GAP SERPL CALC-SCNC: 10 MMOL/L (ref 0–11.9)
AST SERPL-CCNC: 23 U/L (ref 12–45)
BASOPHILS # BLD AUTO: 0.8 % (ref 0–1.8)
BASOPHILS # BLD: 0.06 K/UL (ref 0–0.12)
BILIRUB SERPL-MCNC: 0.5 MG/DL (ref 0.1–1.5)
BUN SERPL-MCNC: 14 MG/DL (ref 8–22)
CALCIUM SERPL-MCNC: 9.9 MG/DL (ref 8.5–10.5)
CHLORIDE SERPL-SCNC: 101 MMOL/L (ref 96–112)
CHOLEST SERPL-MCNC: 146 MG/DL (ref 100–199)
CO2 SERPL-SCNC: 29 MMOL/L (ref 20–33)
CREAT SERPL-MCNC: 0.69 MG/DL (ref 0.5–1.4)
EOSINOPHIL # BLD AUTO: 0.27 K/UL (ref 0–0.51)
EOSINOPHIL NFR BLD: 3.5 % (ref 0–6.9)
ERYTHROCYTE [DISTWIDTH] IN BLOOD BY AUTOMATED COUNT: 47.7 FL (ref 35.9–50)
EST. AVERAGE GLUCOSE BLD GHB EST-MCNC: 137 MG/DL
FASTING STATUS PATIENT QL REPORTED: NORMAL
GLOBULIN SER CALC-MCNC: 4.2 G/DL (ref 1.9–3.5)
GLUCOSE SERPL-MCNC: 98 MG/DL (ref 65–99)
HBA1C MFR BLD: 6.4 % (ref 0–5.6)
HCT VFR BLD AUTO: 40.4 % (ref 37–47)
HDLC SERPL-MCNC: 64 MG/DL
HGB BLD-MCNC: 12.5 G/DL (ref 12–16)
IMM GRANULOCYTES # BLD AUTO: 0.02 K/UL (ref 0–0.11)
IMM GRANULOCYTES NFR BLD AUTO: 0.3 % (ref 0–0.9)
LDLC SERPL CALC-MCNC: 69 MG/DL
LYMPHOCYTES # BLD AUTO: 3 K/UL (ref 1–4.8)
LYMPHOCYTES NFR BLD: 38.9 % (ref 22–41)
MCH RBC QN AUTO: 27.2 PG (ref 27–33)
MCHC RBC AUTO-ENTMCNC: 30.9 G/DL (ref 33.6–35)
MCV RBC AUTO: 88 FL (ref 81.4–97.8)
MONOCYTES # BLD AUTO: 0.56 K/UL (ref 0–0.85)
MONOCYTES NFR BLD AUTO: 7.3 % (ref 0–13.4)
NEUTROPHILS # BLD AUTO: 3.8 K/UL (ref 2–7.15)
NEUTROPHILS NFR BLD: 49.2 % (ref 44–72)
NRBC # BLD AUTO: 0 K/UL
NRBC BLD-RTO: 0 /100 WBC
PLATELET # BLD AUTO: 303 K/UL (ref 164–446)
PMV BLD AUTO: 11.5 FL (ref 9–12.9)
POTASSIUM SERPL-SCNC: 3.1 MMOL/L (ref 3.6–5.5)
PROT SERPL-MCNC: 8.4 G/DL (ref 6–8.2)
RBC # BLD AUTO: 4.59 M/UL (ref 4.2–5.4)
SODIUM SERPL-SCNC: 140 MMOL/L (ref 135–145)
T4 FREE SERPL-MCNC: 1.23 NG/DL (ref 0.53–1.43)
TRIGL SERPL-MCNC: 64 MG/DL (ref 0–149)
TSH SERPL DL<=0.005 MIU/L-ACNC: 2.38 UIU/ML (ref 0.38–5.33)
WBC # BLD AUTO: 7.7 K/UL (ref 4.8–10.8)

## 2019-11-05 PROCEDURE — 84439 ASSAY OF FREE THYROXINE: CPT

## 2019-11-05 PROCEDURE — 83036 HEMOGLOBIN GLYCOSYLATED A1C: CPT

## 2019-11-05 PROCEDURE — 85025 COMPLETE CBC W/AUTO DIFF WBC: CPT

## 2019-11-05 PROCEDURE — 80061 LIPID PANEL: CPT

## 2019-11-05 PROCEDURE — 36415 COLL VENOUS BLD VENIPUNCTURE: CPT

## 2019-11-05 PROCEDURE — 80053 COMPREHEN METABOLIC PANEL: CPT

## 2019-11-05 PROCEDURE — 84443 ASSAY THYROID STIM HORMONE: CPT

## 2020-01-23 ENCOUNTER — HOSPITAL ENCOUNTER (EMERGENCY)
Facility: MEDICAL CENTER | Age: 80
End: 2020-01-23
Attending: EMERGENCY MEDICINE
Payer: MEDICARE

## 2020-01-23 VITALS
OXYGEN SATURATION: 95 % | WEIGHT: 126.98 LBS | DIASTOLIC BLOOD PRESSURE: 71 MMHG | HEART RATE: 93 BPM | SYSTOLIC BLOOD PRESSURE: 166 MMHG | TEMPERATURE: 98.1 F | RESPIRATION RATE: 18 BRPM | HEIGHT: 59 IN | BODY MASS INDEX: 25.6 KG/M2

## 2020-01-23 DIAGNOSIS — H44.001 ENDOPHTHALMITIS, ACUTE, RIGHT: ICD-10-CM

## 2020-01-23 LAB
GRAM STN SPEC: NORMAL
SIGNIFICANT IND 70042: NORMAL
SITE SITE: NORMAL
SOURCE SOURCE: NORMAL

## 2020-01-23 PROCEDURE — 87070 CULTURE OTHR SPECIMN AEROBIC: CPT

## 2020-01-23 PROCEDURE — 99284 EMERGENCY DEPT VISIT MOD MDM: CPT

## 2020-01-23 PROCEDURE — 65800 DRAINAGE OF EYE: CPT

## 2020-01-23 PROCEDURE — 87205 SMEAR GRAM STAIN: CPT

## 2020-01-23 PROCEDURE — 700101 HCHG RX REV CODE 250: Performed by: EMERGENCY MEDICINE

## 2020-01-23 PROCEDURE — 96374 THER/PROPH/DIAG INJ IV PUSH: CPT

## 2020-01-23 PROCEDURE — 700111 HCHG RX REV CODE 636 W/ 250 OVERRIDE (IP): Performed by: EMERGENCY MEDICINE

## 2020-01-23 PROCEDURE — 306637 HCHG MISC ORTHO ITEM RC 0274

## 2020-01-23 RX ORDER — DEXAMETHASONE SODIUM PHOSPHATE 4 MG/ML
4 INJECTION, SOLUTION INTRA-ARTICULAR; INTRALESIONAL; INTRAMUSCULAR; INTRAVENOUS; SOFT TISSUE ONCE
Status: COMPLETED | OUTPATIENT
Start: 2020-01-23 | End: 2020-01-23

## 2020-01-23 RX ADMIN — CEFTAZIDIME 2 MG: 100 INJECTION, POWDER, FOR SOLUTION INTRAMUSCULAR; INTRAVENOUS at 18:45

## 2020-01-23 RX ADMIN — DEXAMETHASONE SODIUM PHOSPHATE 4 MG: 4 INJECTION, SOLUTION INTRA-ARTICULAR; INTRALESIONAL; INTRAMUSCULAR; INTRAVENOUS; SOFT TISSUE at 18:45

## 2020-01-23 RX ADMIN — VANCOMYCIN HYDROCHLORIDE 1 MG: 500 INJECTION, POWDER, LYOPHILIZED, FOR SOLUTION INTRAVENOUS at 18:45

## 2020-01-24 NOTE — ED NOTES
Discharge instructions and follow-up care reviewed with patient. All questions answered and patient verbalized understanding. Vss. Patient stable and ambulatory upon d/c from ED with daughter.

## 2020-01-24 NOTE — ED PROVIDER NOTES
"ED Provider Note    Scribed for Melina Ge M.D. by Enrike Morales. 1/23/2020, 5:06 PM.    Primary care provider: Herson Garcia M.D.  Means of arrival: Walk-in  History obtained from: Patient  History limited by: None    CHIEF COMPLAINT  Chief Complaint   Patient presents with   • Sent by MD     Sent by Dr Mckinley, for injection to right eye; pt has endophtualuitis       HPI  Debra Stockton is a 79 y.o. female who presents to the Emergency Department sent by MD for endophthalmitis with pruritis to right eye onset 2 weeks ago. Patient was seen by Dr. Mckinley earlier today who diagnosed the patient and instructed them to come to the ED for \"an injection\", as her eye drops we was started on last week have not been providing adequate alleviation. She has pain to her right eye lid, but denies any nausea, vomiting, or fever.     REVIEW OF SYSTEMS  Pertinent positives include pruritis and pain to right eye. Pertinent negatives include no nausea, vomiting, or fever.  See HPI for further details.     PAST MEDICAL HISTORY  Past Medical History:   Diagnosis Date   • Arthritis    • Dental disorder     dentures Upper and lower   • Glaucoma    • Heart burn    • Hypertension    • Indigestion    • Other specified disorder of intestines     constipation   • Unspecified cataract     dylan IOL   • Unspecified disorder of thyroid    • Unspecified essential hypertension 10/30/2014   • Unspecified hypothyroidism 10/30/2014       SURGICAL HISTORY  Past Surgical History:   Procedure Laterality Date   • CERVICAL DISK AND FUSION ANTERIOR N/A 6/25/2018    Procedure: CERVICAL DISK AND FUSION ANTERIOR/ C4-7;  Surgeon: Lester Doyle M.D.;  Location: SURGERY [facility];  Service: Neurosurgery   • CORPECTOMY N/A 6/25/2018    Procedure: CORPECTOMY/ C5;  Surgeon: Lester Doyle M.D.;  Location: SURGERY [facility];  Service: Neurosurgery   • BLEPHAROPLASTY Left 9/7/2016    Procedure:  PERMANENT TEMPORAL PARTIAL " TARSORRHAPHY;  Surgeon: Alphonso Montoya M.D.;  Location: SURGERY [facility];  Service:    • EYE FOREIGN BODY REMOVAL Left 1/29/2016    Procedure: EYE FOREIGN BODY REMOVAL  FOR: REMOVAL OF UPPER EYELID WEIGHT IMPLANT;  Surgeon: Alphonso Montoya M.D.;  Location: SURGERY SAME DAY [facility];  Service:    • LUMBAR LAMINECTOMY DISKECTOMY Left 7/17/2015    Procedure: LUMBAR LAMINECTOMY DISKECTOMY POSTERIOR L5-S1 MICRO;  Surgeon: Abel Salazar M.D.;  Location: SURGERY [facility];  Service:    • LID TIGHTENING  8/27/2014    Performed by Omer Servin M.D. at [facility]   • EYE FOREIGN BODY REMOVAL  5/20/2014    Performed by Omer Servin M.D. at [facility]   • OTHER  2004    left ear tumor removed   • APPENDECTOMY  1996   • OTHER  1952    tonsillectomy   • OTHER  2-3yrs ago    l eye  with multiple surgeries   • OTHER ABDOMINAL SURGERY  over 40 yrs ago    gavino wagner       SOCIAL HISTORY  Social History     Tobacco Use   • Smoking status: Never Smoker   • Smokeless tobacco: Never Used   Substance Use Topics   • Alcohol use: No   • Drug use: No      Social History     Substance and Sexual Activity   Drug Use No       FAMILY HISTORY  Family History   Problem Relation Age of Onset   • Diabetes Mother    • Cancer Father    • Diabetes Father    • Diabetes Brother        CURRENT MEDICATIONS  Home Medications     Reviewed by Shae Bermudez R.N. (Registered Nurse) on 01/23/20 at 1625  Med List Status: Partial   Medication Last Dose Status   benzocaine-menthol (CEPACOL) 15-3.6 MG Lozenge prn Active   CALCIUM PO not taking Active   Cholecalciferol (VITAMIN D PO) 1/23/2020 Active   COMBIGAN 0.2-0.5 % Solution 1/23/2020 Active   cyclobenzaprine (FLEXERIL) 5 MG tablet not taking Active   docusate sodium 100 MG Cap prn Active   dorzolamide (TRUSOPT) 2 % Solution 1/23/2020 Active   levothyroxine (SYNTHROID) 75 MCG Tab 1/23/2020 Active   losartan-hydrochlorothiazide (HYZAAR) 100-12.5 MG  "per tablet 1/23/2020 Active   meclizine (ANTIVERT) 25 MG Tab 1/23/2020 Active   TRAVATAN Z 0.004 % Solution 1/23/2020 Active                ALLERGIES  No Known Allergies    PHYSICAL EXAM  VITAL SIGNS: /72   Pulse 89   Temp 36.7 °C (98.1 °F) (Temporal)   Resp 18   Ht 1.499 m (4' 11\")   Wt 57.6 kg (126 lb 15.8 oz)   SpO2 96%   BMI 25.65 kg/m²   Vitals reviewed.  Consitutional: Well-developed, well-nourished. Negative for: distress.  HENT: Normocephalic, right external ear normal, left external ear normal, oropharynx clear and moist.  Eyes: Diffuse conjunctival injection to right eye, opacification to the right cornea, left cornea is completely opacified, positive photophobia to right eye, extraocular movements normal. Negative for: discharge in right and left eye, icterus.  Neck: Range of motion normal, supple.  Musculoskeletal: Normal range of motion. Negative for edema.  Neurological: Alert and oriented x3. No focal deficits.  Skin: Warm, dry. Negative for rash.  Psych: Mood/affect normal, behavior normal, judgment normal.      COURSE & MEDICAL DECISION MAKING  Nursing notes, VS, PMSFHx reviewed in chart.    5:06 PM Patient seen and examined at bedside. The patient presents with right eye pain secondary to endophthalmitis. Patient will be treated with Decadron 4 mg, Fortaz IO intraocular injection 2 mg, and vancomycin intraocular injection 1 mg for her symptoms.      5:16 PM Spoke to patient's ophthalmologist in regards to injection recommendation.     5:49 PM Spoke to Pharmacy who will consult with patient's ophthalmologist in regards to specific medication dosage.    6:45 PM Dr. Mckinley at bedside administering injection.     7:03 PM Patient was reevaluated at bedside. She was cleared for discharge at this time, and the patient was understanding and agreeable to discharge.    The patient will return for new or worsening symptoms and is stable at the time of discharge.    The patient is referred to a " primary physician for blood pressure management, diabetic screening, and for all other preventative health concerns.    DISPOSITION:  Patient will be discharged home in stable condition.    FOLLOW UP:  Horacio Mckinley M.D.  [address] [place] [address]  [phone]      tomorrow at 1130 as scheduled      OUTPATIENT MEDICATIONS:  New Prescriptions    No medications on file       FINAL IMPRESSION  1. Endophthalmitis, acute, right         I, Melina Ge M.D. personally performed the services described in this documentation, as scribed by Enrike Morales in my presence, and it is both accurate and complete.    C.    The note accurately reflects work and decisions made by me.  Melina Ge M.D.  1/23/2020  11:21 PM

## 2020-01-24 NOTE — ED TRIAGE NOTES
Pt ambulated to triage with   Chief Complaint   Patient presents with   • Sent by MD     Sent by Dr Mckinley, for injection to right eye; pt has endophtualuitis      Pt Informed regarding triage process and verbalized understanding to inform triage tech or RN for any changes in condition. Placed in [facility].

## 2020-01-24 NOTE — PROCEDURES
The patient's right eye was prepped and draped in the usual fashion for intravitreal injection. The eye was sterilized with 5% betadine applied to the lashes and on the conjunctiva after drops of proparacaine were applied to the left eye. 2% subconjunctival lidocaine was used to anesthetize the left eye. A lid speculum was placed and  A drop of 5% betadine was then applied to the temporal aspect of the cornea. A 30 gauge needle was then placed through the temporal aspect of the cornea and approximately 0.1mL of aqueous fluid was removed from the anterior chamber and was sent for gram stain and culture. Next, a betadine drop was applied to the site of injection in the inferior temporal quadrant. An intravitreal injection of vancomycin (1mg/0.1mL), ceftazidime (2mg/0.1mL), and dexamethasone (0.4mg/0.1mL) was performed. The patient tolerated the procedure without any complications.

## 2020-01-26 LAB
BACTERIA FLD AEROBE CULT: NORMAL
GRAM STN SPEC: NORMAL
SIGNIFICANT IND 70042: NORMAL
SITE SITE: NORMAL
SOURCE SOURCE: NORMAL

## 2020-02-06 ENCOUNTER — HOSPITAL ENCOUNTER (OUTPATIENT)
Dept: LAB | Facility: MEDICAL CENTER | Age: 80
End: 2020-02-06
Attending: FAMILY MEDICINE
Payer: MEDICARE

## 2020-02-06 LAB
ALBUMIN SERPL BCP-MCNC: 4.2 G/DL (ref 3.2–4.9)
ALBUMIN/GLOB SERPL: 1.1 G/DL
ALP SERPL-CCNC: 89 U/L (ref 30–99)
ALT SERPL-CCNC: 23 U/L (ref 2–50)
ANION GAP SERPL CALC-SCNC: 7 MMOL/L (ref 0–11.9)
AST SERPL-CCNC: 23 U/L (ref 12–45)
BILIRUB SERPL-MCNC: 0.6 MG/DL (ref 0.1–1.5)
BUN SERPL-MCNC: 12 MG/DL (ref 8–22)
CALCIUM SERPL-MCNC: 9.5 MG/DL (ref 8.5–10.5)
CHLORIDE SERPL-SCNC: 103 MMOL/L (ref 96–112)
CHOLEST SERPL-MCNC: 166 MG/DL (ref 100–199)
CO2 SERPL-SCNC: 29 MMOL/L (ref 20–33)
CREAT SERPL-MCNC: 0.7 MG/DL (ref 0.5–1.4)
EST. AVERAGE GLUCOSE BLD GHB EST-MCNC: 134 MG/DL
GLOBULIN SER CALC-MCNC: 3.8 G/DL (ref 1.9–3.5)
GLUCOSE SERPL-MCNC: 102 MG/DL (ref 65–99)
HBA1C MFR BLD: 6.3 % (ref 0–5.6)
HDLC SERPL-MCNC: 61 MG/DL
LDLC SERPL CALC-MCNC: 93 MG/DL
POTASSIUM SERPL-SCNC: 3.9 MMOL/L (ref 3.6–5.5)
PROT SERPL-MCNC: 8 G/DL (ref 6–8.2)
SODIUM SERPL-SCNC: 139 MMOL/L (ref 135–145)
T4 FREE SERPL-MCNC: 1.27 NG/DL (ref 0.53–1.43)
TRIGL SERPL-MCNC: 60 MG/DL (ref 0–149)
TSH SERPL DL<=0.005 MIU/L-ACNC: 7.41 UIU/ML (ref 0.38–5.33)

## 2020-02-06 PROCEDURE — 80053 COMPREHEN METABOLIC PANEL: CPT

## 2020-02-06 PROCEDURE — 84439 ASSAY OF FREE THYROXINE: CPT

## 2020-02-06 PROCEDURE — 36415 COLL VENOUS BLD VENIPUNCTURE: CPT

## 2020-02-06 PROCEDURE — 84443 ASSAY THYROID STIM HORMONE: CPT

## 2020-02-06 PROCEDURE — 80061 LIPID PANEL: CPT

## 2020-02-06 PROCEDURE — 83036 HEMOGLOBIN GLYCOSYLATED A1C: CPT

## 2020-06-18 ENCOUNTER — HOSPITAL ENCOUNTER (OUTPATIENT)
Dept: LAB | Facility: MEDICAL CENTER | Age: 80
End: 2020-06-18
Attending: FAMILY MEDICINE
Payer: MEDICARE

## 2020-06-18 LAB
ALBUMIN SERPL BCP-MCNC: 4.3 G/DL (ref 3.2–4.9)
ALBUMIN/GLOB SERPL: 1.2 G/DL
ALP SERPL-CCNC: 105 U/L (ref 30–99)
ALT SERPL-CCNC: 16 U/L (ref 2–50)
ANION GAP SERPL CALC-SCNC: 13 MMOL/L (ref 7–16)
AST SERPL-CCNC: 24 U/L (ref 12–45)
BILIRUB SERPL-MCNC: 0.4 MG/DL (ref 0.1–1.5)
BUN SERPL-MCNC: 17 MG/DL (ref 8–22)
CALCIUM SERPL-MCNC: 9.7 MG/DL (ref 8.5–10.5)
CHLORIDE SERPL-SCNC: 98 MMOL/L (ref 96–112)
CHOLEST SERPL-MCNC: 158 MG/DL (ref 100–199)
CO2 SERPL-SCNC: 27 MMOL/L (ref 20–33)
CREAT SERPL-MCNC: 0.61 MG/DL (ref 0.5–1.4)
FASTING STATUS PATIENT QL REPORTED: NORMAL
GLOBULIN SER CALC-MCNC: 3.7 G/DL (ref 1.9–3.5)
GLUCOSE SERPL-MCNC: 121 MG/DL (ref 65–99)
HDLC SERPL-MCNC: 75 MG/DL
LDLC SERPL CALC-MCNC: 70 MG/DL
POTASSIUM SERPL-SCNC: 3.7 MMOL/L (ref 3.6–5.5)
PROT SERPL-MCNC: 8 G/DL (ref 6–8.2)
SODIUM SERPL-SCNC: 138 MMOL/L (ref 135–145)
T4 FREE SERPL-MCNC: 1.68 NG/DL (ref 0.93–1.7)
TRIGL SERPL-MCNC: 67 MG/DL (ref 0–149)
TSH SERPL DL<=0.005 MIU/L-ACNC: 0.21 UIU/ML (ref 0.38–5.33)

## 2020-06-18 PROCEDURE — 84439 ASSAY OF FREE THYROXINE: CPT

## 2020-06-18 PROCEDURE — 36415 COLL VENOUS BLD VENIPUNCTURE: CPT

## 2020-06-18 PROCEDURE — 80053 COMPREHEN METABOLIC PANEL: CPT

## 2020-06-18 PROCEDURE — 84443 ASSAY THYROID STIM HORMONE: CPT

## 2020-06-18 PROCEDURE — 80061 LIPID PANEL: CPT

## 2020-08-12 ENCOUNTER — HOSPITAL ENCOUNTER (OUTPATIENT)
Dept: LAB | Facility: MEDICAL CENTER | Age: 80
End: 2020-08-12
Attending: FAMILY MEDICINE
Payer: MEDICARE

## 2020-08-12 LAB
ALBUMIN SERPL BCP-MCNC: 4.2 G/DL (ref 3.2–4.9)
ALBUMIN/GLOB SERPL: 1.2 G/DL
ALP SERPL-CCNC: 112 U/L (ref 30–99)
ALT SERPL-CCNC: 12 U/L (ref 2–50)
ANION GAP SERPL CALC-SCNC: 9 MMOL/L (ref 7–16)
AST SERPL-CCNC: 19 U/L (ref 12–45)
BILIRUB SERPL-MCNC: 0.4 MG/DL (ref 0.1–1.5)
BUN SERPL-MCNC: 20 MG/DL (ref 8–22)
CALCIUM SERPL-MCNC: 9.8 MG/DL (ref 8.5–10.5)
CHLORIDE SERPL-SCNC: 101 MMOL/L (ref 96–112)
CO2 SERPL-SCNC: 29 MMOL/L (ref 20–33)
CREAT SERPL-MCNC: 0.67 MG/DL (ref 0.5–1.4)
EST. AVERAGE GLUCOSE BLD GHB EST-MCNC: 134 MG/DL
GLOBULIN SER CALC-MCNC: 3.6 G/DL (ref 1.9–3.5)
GLUCOSE SERPL-MCNC: 111 MG/DL (ref 65–99)
HBA1C MFR BLD: 6.3 % (ref 0–5.6)
POTASSIUM SERPL-SCNC: 4.1 MMOL/L (ref 3.6–5.5)
PROT SERPL-MCNC: 7.8 G/DL (ref 6–8.2)
SODIUM SERPL-SCNC: 139 MMOL/L (ref 135–145)
T4 FREE SERPL-MCNC: 1.48 NG/DL (ref 0.93–1.7)
TSH SERPL DL<=0.005 MIU/L-ACNC: 0.81 UIU/ML (ref 0.38–5.33)

## 2020-08-12 PROCEDURE — 84443 ASSAY THYROID STIM HORMONE: CPT

## 2020-08-12 PROCEDURE — 80053 COMPREHEN METABOLIC PANEL: CPT

## 2020-08-12 PROCEDURE — 83036 HEMOGLOBIN GLYCOSYLATED A1C: CPT

## 2020-08-12 PROCEDURE — 84439 ASSAY OF FREE THYROXINE: CPT

## 2020-08-12 PROCEDURE — 36415 COLL VENOUS BLD VENIPUNCTURE: CPT

## 2020-10-30 ENCOUNTER — HOSPITAL ENCOUNTER (OUTPATIENT)
Facility: MEDICAL CENTER | Age: 80
End: 2020-10-30
Attending: NURSE PRACTITIONER
Payer: MEDICARE

## 2020-10-30 ENCOUNTER — OFFICE VISIT (OUTPATIENT)
Dept: URGENT CARE | Facility: CLINIC | Age: 80
End: 2020-10-30
Payer: MEDICARE

## 2020-10-30 VITALS
HEIGHT: 57 IN | BODY MASS INDEX: 25.89 KG/M2 | HEART RATE: 110 BPM | OXYGEN SATURATION: 94 % | WEIGHT: 120 LBS | TEMPERATURE: 98.8 F | SYSTOLIC BLOOD PRESSURE: 110 MMHG | RESPIRATION RATE: 12 BRPM | DIASTOLIC BLOOD PRESSURE: 78 MMHG

## 2020-10-30 DIAGNOSIS — J02.9 PHARYNGITIS, UNSPECIFIED ETIOLOGY: ICD-10-CM

## 2020-10-30 DIAGNOSIS — R51.9 NONINTRACTABLE HEADACHE, UNSPECIFIED CHRONICITY PATTERN, UNSPECIFIED HEADACHE TYPE: ICD-10-CM

## 2020-10-30 LAB
INT CON NEG: NEGATIVE
INT CON POS: POSITIVE
S PYO AG THROAT QL: NORMAL

## 2020-10-30 PROCEDURE — 99213 OFFICE O/P EST LOW 20 MIN: CPT | Performed by: NURSE PRACTITIONER

## 2020-10-30 PROCEDURE — U0003 INFECTIOUS AGENT DETECTION BY NUCLEIC ACID (DNA OR RNA); SEVERE ACUTE RESPIRATORY SYNDROME CORONAVIRUS 2 (SARS-COV-2) (CORONAVIRUS DISEASE [COVID-19]), AMPLIFIED PROBE TECHNIQUE, MAKING USE OF HIGH THROUGHPUT TECHNOLOGIES AS DESCRIBED BY CMS-2020-01-R: HCPCS

## 2020-10-30 PROCEDURE — 87880 STREP A ASSAY W/OPTIC: CPT | Performed by: NURSE PRACTITIONER

## 2020-10-30 ASSESSMENT — ENCOUNTER SYMPTOMS
NAUSEA: 0
MYALGIAS: 0
CHILLS: 0
TROUBLE SWALLOWING: 0
SWOLLEN GLANDS: 0
ABDOMINAL PAIN: 0
DIZZINESS: 0
SHORTNESS OF BREATH: 0
SORE THROAT: 1
VOMITING: 0
FEVER: 0
COUGH: 0
EYE REDNESS: 0
HOARSE VOICE: 0
HEADACHES: 1

## 2020-10-30 ASSESSMENT — FIBROSIS 4 INDEX: FIB4 SCORE: 1.41

## 2020-10-31 NOTE — PROGRESS NOTES
"Subjective:   Debra Stockton  is a 80 y.o. female who presents for Sore Throat (x1 week for sore throat , little of a running nose)  Patient is Citizen of [place]-speaking only.  Daughter translating for encounter.      Pharyngitis   This is a new problem. The current episode started in the past 7 days. The problem has been unchanged. Neither side of throat is experiencing more pain than the other. There has been no fever. The pain is at a severity of 7/10. The pain is moderate. Associated symptoms include congestion and headaches. Pertinent negatives include no abdominal pain, coughing, ear discharge, hoarse voice, shortness of breath, swollen glands, trouble swallowing or vomiting. She has had no exposure to strep or mono. She has tried acetaminophen for the symptoms. The treatment provided no relief.   Patient with no recent travel and/or close contact with COVID-19.  Patient does not wish to be tested for Covid.    Review of Systems   Constitutional: Negative for chills and fever.   HENT: Positive for congestion and sore throat. Negative for ear discharge, hoarse voice and trouble swallowing.    Eyes: Negative for redness.   Respiratory: Negative for cough and shortness of breath.    Cardiovascular: Negative for chest pain.   Gastrointestinal: Negative for abdominal pain, nausea and vomiting.   Genitourinary: Negative for dysuria.   Musculoskeletal: Negative for myalgias.   Skin: Negative for rash.   Neurological: Positive for headaches. Negative for dizziness.     No Known Allergies   Objective:   /78   Pulse (!) 110   Temp 37.1 °C (98.8 °F) (Temporal)   Resp 12   Ht 1.448 m (4' 9\")   Wt 54.4 kg (120 lb)   SpO2 94%   BMI 25.97 kg/m²   Physical Exam  Vitals signs and nursing note reviewed.   Constitutional:       General: She is not in acute distress.     Appearance: She is well-developed.   HENT:      Head: Normocephalic and atraumatic.      Right Ear: Tympanic membrane and external ear normal.      Left " Ear: Tympanic membrane and external ear normal.      Nose: Nose normal.      Right Sinus: No maxillary sinus tenderness or frontal sinus tenderness.      Left Sinus: No maxillary sinus tenderness or frontal sinus tenderness.      Mouth/Throat:      Mouth: Mucous membranes are moist.      Pharynx: Uvula midline. No posterior oropharyngeal erythema.      Tonsils: No tonsillar exudate or tonsillar abscesses.   Eyes:      General:         Right eye: No discharge.         Left eye: No discharge.      Conjunctiva/sclera: Conjunctivae normal.   Cardiovascular:      Rate and Rhythm: Normal rate.   Pulmonary:      Effort: Pulmonary effort is normal. No respiratory distress.      Breath sounds: Normal breath sounds.   Abdominal:      General: There is no distension.   Musculoskeletal: Normal range of motion.   Skin:     General: Skin is warm and dry.   Neurological:      General: No focal deficit present.      Mental Status: She is alert and oriented to person, place, and time. Mental status is at baseline.      Gait: Gait (gait at baseline) normal.   Psychiatric:         Judgment: Judgment normal.           Assessment/Plan:     1. Pharyngitis, unspecified etiology  POCT Rapid Strep A    COVID/SARS COV-2 PCR   2. Nonintractable headache, unspecified chronicity pattern, unspecified headache type      \  Strep negative  Patient will be tested for COVID-19 at this time  · Provided patient with self-isolation instructions  · Provided ER precautions including worsening shortness of breath and high fever  · Stressed the seriousness of isolation and prevention of transmissible disease  · Instructed to take 1000 mg of Tylenol 3 times per day  Differential diagnosis, natural history, supportive care, and indications for immediate follow-up discussed.

## 2020-11-01 DIAGNOSIS — J02.9 PHARYNGITIS, UNSPECIFIED ETIOLOGY: ICD-10-CM

## 2020-11-01 LAB — COVID ORDER STATUS COVID19: NORMAL

## 2020-11-02 LAB
SARS-COV-2 RNA RESP QL NAA+PROBE: NOTDETECTED
SPECIMEN SOURCE: NORMAL

## 2020-11-10 ENCOUNTER — HOSPITAL ENCOUNTER (OUTPATIENT)
Dept: LAB | Facility: MEDICAL CENTER | Age: 80
End: 2020-11-10
Attending: FAMILY MEDICINE
Payer: MEDICARE

## 2020-11-10 LAB
ALBUMIN SERPL BCP-MCNC: 3.8 G/DL (ref 3.2–4.9)
ALBUMIN/GLOB SERPL: 1.1 G/DL
ALP SERPL-CCNC: 99 U/L (ref 30–99)
ALT SERPL-CCNC: 13 U/L (ref 2–50)
ANION GAP SERPL CALC-SCNC: 10 MMOL/L (ref 7–16)
AST SERPL-CCNC: 20 U/L (ref 12–45)
BASOPHILS # BLD AUTO: 0.7 % (ref 0–1.8)
BASOPHILS # BLD: 0.05 K/UL (ref 0–0.12)
BILIRUB SERPL-MCNC: 0.4 MG/DL (ref 0.1–1.5)
BUN SERPL-MCNC: 21 MG/DL (ref 8–22)
CALCIUM SERPL-MCNC: 9.5 MG/DL (ref 8.5–10.5)
CHLORIDE SERPL-SCNC: 101 MMOL/L (ref 96–112)
CHOLEST SERPL-MCNC: 154 MG/DL (ref 100–199)
CO2 SERPL-SCNC: 27 MMOL/L (ref 20–33)
CREAT SERPL-MCNC: 0.65 MG/DL (ref 0.5–1.4)
EOSINOPHIL # BLD AUTO: 0.2 K/UL (ref 0–0.51)
EOSINOPHIL NFR BLD: 2.8 % (ref 0–6.9)
ERYTHROCYTE [DISTWIDTH] IN BLOOD BY AUTOMATED COUNT: 48 FL (ref 35.9–50)
EST. AVERAGE GLUCOSE BLD GHB EST-MCNC: 140 MG/DL
FASTING STATUS PATIENT QL REPORTED: NORMAL
GLOBULIN SER CALC-MCNC: 3.5 G/DL (ref 1.9–3.5)
GLUCOSE SERPL-MCNC: 103 MG/DL (ref 65–99)
HBA1C MFR BLD: 6.5 % (ref 0–5.6)
HCT VFR BLD AUTO: 36.8 % (ref 37–47)
HDLC SERPL-MCNC: 66 MG/DL
HGB BLD-MCNC: 11.5 G/DL (ref 12–16)
IMM GRANULOCYTES # BLD AUTO: 0.01 K/UL (ref 0–0.11)
IMM GRANULOCYTES NFR BLD AUTO: 0.1 % (ref 0–0.9)
LDLC SERPL CALC-MCNC: 76 MG/DL
LYMPHOCYTES # BLD AUTO: 2.53 K/UL (ref 1–4.8)
LYMPHOCYTES NFR BLD: 35.7 % (ref 22–41)
MCH RBC QN AUTO: 27.1 PG (ref 27–33)
MCHC RBC AUTO-ENTMCNC: 31.3 G/DL (ref 33.6–35)
MCV RBC AUTO: 86.8 FL (ref 81.4–97.8)
MONOCYTES # BLD AUTO: 0.5 K/UL (ref 0–0.85)
MONOCYTES NFR BLD AUTO: 7.1 % (ref 0–13.4)
NEUTROPHILS # BLD AUTO: 3.8 K/UL (ref 2–7.15)
NEUTROPHILS NFR BLD: 53.6 % (ref 44–72)
NRBC # BLD AUTO: 0 K/UL
NRBC BLD-RTO: 0 /100 WBC
PLATELET # BLD AUTO: 243 K/UL (ref 164–446)
PMV BLD AUTO: 12.2 FL (ref 9–12.9)
POTASSIUM SERPL-SCNC: 3.9 MMOL/L (ref 3.6–5.5)
PROT SERPL-MCNC: 7.3 G/DL (ref 6–8.2)
RBC # BLD AUTO: 4.24 M/UL (ref 4.2–5.4)
SODIUM SERPL-SCNC: 138 MMOL/L (ref 135–145)
T4 FREE SERPL-MCNC: 1.37 NG/DL (ref 0.93–1.7)
TRIGL SERPL-MCNC: 62 MG/DL (ref 0–149)
TSH SERPL DL<=0.005 MIU/L-ACNC: 3.42 UIU/ML (ref 0.38–5.33)
WBC # BLD AUTO: 7.1 K/UL (ref 4.8–10.8)

## 2020-11-10 PROCEDURE — 80061 LIPID PANEL: CPT

## 2020-11-10 PROCEDURE — 84439 ASSAY OF FREE THYROXINE: CPT

## 2020-11-10 PROCEDURE — 85025 COMPLETE CBC W/AUTO DIFF WBC: CPT

## 2020-11-10 PROCEDURE — 36415 COLL VENOUS BLD VENIPUNCTURE: CPT

## 2020-11-10 PROCEDURE — 83036 HEMOGLOBIN GLYCOSYLATED A1C: CPT

## 2020-11-10 PROCEDURE — 80053 COMPREHEN METABOLIC PANEL: CPT

## 2020-11-10 PROCEDURE — 84443 ASSAY THYROID STIM HORMONE: CPT

## 2020-11-18 ENCOUNTER — HOSPITAL ENCOUNTER (OUTPATIENT)
Dept: LAB | Facility: MEDICAL CENTER | Age: 80
End: 2020-11-18
Attending: FAMILY MEDICINE
Payer: MEDICARE

## 2020-11-18 PROCEDURE — U0003 INFECTIOUS AGENT DETECTION BY NUCLEIC ACID (DNA OR RNA); SEVERE ACUTE RESPIRATORY SYNDROME CORONAVIRUS 2 (SARS-COV-2) (CORONAVIRUS DISEASE [COVID-19]), AMPLIFIED PROBE TECHNIQUE, MAKING USE OF HIGH THROUGHPUT TECHNOLOGIES AS DESCRIBED BY CMS-2020-01-R: HCPCS

## 2020-11-18 PROCEDURE — C9803 HOPD COVID-19 SPEC COLLECT: HCPCS

## 2020-11-20 LAB
COVID ORDER STATUS COVID19: NORMAL
SARS-COV-2 RNA RESP QL NAA+PROBE: NOTDETECTED
SPECIMEN SOURCE: NORMAL

## 2020-12-31 ENCOUNTER — APPOINTMENT (OUTPATIENT)
Dept: RADIOLOGY | Facility: MEDICAL CENTER | Age: 80
End: 2020-12-31
Attending: EMERGENCY MEDICINE
Payer: MEDICARE

## 2020-12-31 ENCOUNTER — HOSPITAL ENCOUNTER (EMERGENCY)
Facility: MEDICAL CENTER | Age: 80
End: 2021-01-01
Attending: EMERGENCY MEDICINE
Payer: MEDICARE

## 2020-12-31 DIAGNOSIS — I16.0 HYPERTENSIVE URGENCY: ICD-10-CM

## 2020-12-31 LAB
ANION GAP SERPL CALC-SCNC: 13 MMOL/L (ref 7–16)
BUN SERPL-MCNC: 21 MG/DL (ref 8–22)
CALCIUM SERPL-MCNC: 9.7 MG/DL (ref 8.5–10.5)
CHLORIDE SERPL-SCNC: 94 MMOL/L (ref 96–112)
CO2 SERPL-SCNC: 25 MMOL/L (ref 20–33)
CREAT SERPL-MCNC: 0.84 MG/DL (ref 0.5–1.4)
GLUCOSE SERPL-MCNC: 139 MG/DL (ref 65–99)
POTASSIUM SERPL-SCNC: 3.6 MMOL/L (ref 3.6–5.5)
SODIUM SERPL-SCNC: 132 MMOL/L (ref 135–145)
TROPONIN T SERPL-MCNC: <6 NG/L (ref 6–19)

## 2020-12-31 PROCEDURE — 80048 BASIC METABOLIC PNL TOTAL CA: CPT

## 2020-12-31 PROCEDURE — 700101 HCHG RX REV CODE 250: Performed by: EMERGENCY MEDICINE

## 2020-12-31 PROCEDURE — 71045 X-RAY EXAM CHEST 1 VIEW: CPT

## 2020-12-31 PROCEDURE — 96374 THER/PROPH/DIAG INJ IV PUSH: CPT

## 2020-12-31 PROCEDURE — 84484 ASSAY OF TROPONIN QUANT: CPT

## 2020-12-31 PROCEDURE — 70450 CT HEAD/BRAIN W/O DYE: CPT

## 2020-12-31 PROCEDURE — 99284 EMERGENCY DEPT VISIT MOD MDM: CPT

## 2020-12-31 RX ORDER — LABETALOL HYDROCHLORIDE 5 MG/ML
10 INJECTION, SOLUTION INTRAVENOUS ONCE
Status: COMPLETED | OUTPATIENT
Start: 2020-12-31 | End: 2020-12-31

## 2020-12-31 RX ADMIN — LABETALOL HYDROCHLORIDE 10 MG: 5 INJECTION, SOLUTION INTRAVENOUS at 23:10

## 2020-12-31 ASSESSMENT — FIBROSIS 4 INDEX: FIB4 SCORE: 1.78

## 2021-01-01 VITALS
WEIGHT: 129 LBS | HEART RATE: 86 BPM | DIASTOLIC BLOOD PRESSURE: 72 MMHG | SYSTOLIC BLOOD PRESSURE: 167 MMHG | HEIGHT: 58 IN | TEMPERATURE: 97.7 F | OXYGEN SATURATION: 96 % | BODY MASS INDEX: 27.08 KG/M2 | RESPIRATION RATE: 18 BRPM

## 2021-01-01 NOTE — ED TRIAGE NOTES
Chief Complaint   Patient presents with   • Hypertension     PT c/o increasing BP osince 5 pm/  She was checking it at home and it was 190's systolic.  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  Pt has residual eye injury and and facial dropp from this event.    No neuro deficit noted upon entry to ED.      IV started, labs sent.      PT states she was taking 100 mg losartan until yesterday.  She then started taking 22.5 mg lisinopril per MD instruction and stopped losartan, however her BP has remained elevated.

## 2021-01-01 NOTE — ED PROVIDER NOTES
ED Provider Note        Primary care provider: Herson Garcia M.D.    I verified that the patient was wearing a mask and I was wearing appropriate PPE every time I entered the room. The patient's mask was on the patient at all times during my encounter except for a brief view of the oropharynx.      CHIEF COMPLAINT  Chief Complaint   Patient presents with   • Hypertension     PT c/o increasing BP osince 5 pm/  She was checking it at home and it was 190's systolic.  Medication changed yesterday from losartan to lisinopril//hctz combo pill   • Headache     Worsening HA 5/10 with increasing BP       HPI  Debra Stockton is a 80 y.o. female who presents to the Emergency Department with chief complaint of elevated blood pressure. The patient initially had been taking losartan 100mg.  She had noticed that her blood pressure had slowly been increasing and her primary care changed her from losartan to lisinopril 12.5mg BID. As her blood pressure continued to increase, she was changed to lisinopril 20mg BID. The patient states that over the past two days her blood pressure continued to increase and she started to develop a headache. The headache was primarily centered at the back of her head. States that the pain would increase to up to 5/10 and is dull and achy. The patient took two regular strength Tylenol for pain which she states helped. She states that she has not had any new dizziness or lightheadedness. 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 The patient denies any chest pain, palpitations, cough, or shortness of breath. She denied any abdominal pain or tearing back pain.     REVIEW OF SYSTEMS  10 systems reviewed and otherwise negative, pertinent positives and negatives listed in the history of present illness.    PAST MEDICAL HISTORY   has a past medical history of Arthritis, Dental disorder,  "Glaucoma, Heart burn, Hypertension, Indigestion, Other specified disorder of intestines, Unspecified cataract, Unspecified disorder of thyroid, Unspecified essential hypertension (10/30/2014), and Unspecified hypothyroidism (10/30/2014).    SURGICAL HISTORY   has a past surgical history that includes other abdominal surgery (over 40 yrs ago); eye foreign body removal (5/20/2014); appendectomy (1996); lid tightening (8/27/2014); lumbar laminectomy diskectomy (Left, 7/17/2015); other (2-3yrs ago); other (2004); other (1952); eye foreign body removal (Left, 1/29/2016); blepharoplasty (Left, 9/7/2016); cervical disk and fusion anterior (N/A, 6/25/2018); and corpectomy (N/A, 6/25/2018).    SOCIAL HISTORY  Social History     Tobacco Use   • Smoking status: Never Smoker   • Smokeless tobacco: Never Used   Substance Use Topics   • Alcohol use: No   • Drug use: No      Social History     Substance and Sexual Activity   Drug Use No       FAMILY HISTORY  Non-Contributory    CURRENT MEDICATIONS  Home Medications     Reviewed by Larisa Estrada R.N. (Registered Nurse) on 12/31/20 at 2226  Med List Status: Partial   Medication Last Dose Status   benzocaine-menthol (CEPACOL) 15-3.6 MG Lozenge  Active   CALCIUM PO  Active   Cholecalciferol (VITAMIN D PO)  Active   COMBIGAN 0.2-0.5 % Solution  Active   cyclobenzaprine (FLEXERIL) 5 MG tablet  Active   docusate sodium 100 MG Cap  Active   dorzolamide (TRUSOPT) 2 % Solution  Active   levothyroxine (SYNTHROID) 75 MCG Tab  Active   losartan-hydrochlorothiazide (HYZAAR) 100-12.5 MG per tablet 12/31/2020 Active   meclizine (ANTIVERT) 25 MG Tab  Active   TRAVATAN Z 0.004 % Solution  Active                ALLERGIES  No Known Allergies    PHYSICAL EXAM  VITAL SIGNS: BP (!) 202/95   Pulse (!) 107   Temp 36.5 °C (97.7 °F)   Resp 16   Ht 1.473 m (4' 10\")   Wt 58.5 kg (129 lb)   SpO2 97%   BMI 26.96 kg/m²   Pulse ox interpretation: I interpret this pulse ox as normal.  Constitutional: " Alert and oriented x 3, Non-toxic appearing  HEENT: Left eye with subconjunctival redness, Right eye with cataract, reactive to light. The nares is clear, external ears are normal, mouth shows moist mucous membranes  Neck: Supple, no JVD no tracheal deviation  Cardiovascular: Increased rate, regular rhythm, no murmur rub or gallop 2+ pulses peripherally x4  Thorax & Lungs: No respiratory distress, no wheezes rales or rhonchi, No chest tenderness.   GI: Soft nontender nondistended positive bowel sounds, no peritoneal signs. No pulsatile masses.  Skin: Warm dry no acute rash or lesion  Musculoskeletal: Moving all extremities with full range and 5 of 5 strength, no acute deformity  Neurologic: Cranial nerves III through XII are grossly intact, no sensory deficit, no cerebellar dysfunction. Left upper extremity biceps and  4/5, Right upper extremity  and biceps 5/5.   Psychiatric: Appropriate affect for situation at this time      DIAGNOSTIC STUDIES / PROCEDURES  LABS      Results for orders placed or performed during the hospital encounter of 12/31/20   TROPONIN   Result Value Ref Range    Troponin T <6 6 - 19 ng/L   BASIC METABOLIC PANEL   Result Value Ref Range    Sodium 132 (L) 135 - 145 mmol/L    Potassium 3.6 3.6 - 5.5 mmol/L    Chloride 94 (L) 96 - 112 mmol/L    Co2 25 20 - 33 mmol/L    Glucose 139 (H) 65 - 99 mg/dL    Bun 21 8 - 22 mg/dL    Creatinine 0.84 0.50 - 1.40 mg/dL    Calcium 9.7 8.5 - 10.5 mg/dL    Anion Gap 13.0 7.0 - 16.0   ESTIMATED GFR   Result Value Ref Range    GFR If African American >60 >60 mL/min/1.73 m 2    GFR If Non African American >60 >60 mL/min/1.73 m 2       All labs reviewed by me.      RADIOLOGY  DX-CHEST-PORTABLE (1 VIEW)   Final Result         1. No acute cardiopulmonary abnormalities are identified.      CT-HEAD W/O   Final Result         1. No acute intracranial abnormality. No evidence of acute intracranial hemorrhage or mass lesion.      2. 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           The radiologist's interpretation of all radiological studies have been reviewed by me.    COURSE & MEDICAL DECISION MAKING  Pertinent Labs & Imaging studies reviewed. (See chart for details)    The patient is a 81 y/o female with a history of hypertension who presented to the hospital with symptoms of headache and elevated blood pressure. CT scan of the head was ordered to rule out underlying hypertensive bleed. CT head showed no evidence of intracranial hemorrhage or mass - only signs of enchephalomalacia of the left cerebellum.  The patient was given a dose of IV labetalol as her heart rate was also elevated at 107. A chemistry panel was ordered which revealed a normal creatinine. As the patient had no evidence of end-organ damage based on CT scan and laboratory findings, she will be discharged home with instructions to follow up with her PCP in one week.       Patient seen and evaluated with resident physician agree with above assessment and plan.  In short this is a pleasant 80-year-old female chronic high blood pressure that had hypertension this week after switching her usual regimen.  She had slight headache head CT is negative troponins negative BNP is unremarkable her given 1 dose of labetalol and her heart rate and blood pressure vastly improved she feels much better she is instructed to keep a log of her blood pressure to only measure twice daily to follow-up with primary care in 1 week after doing so.  She is to return to the ER if she should have worsening headache chest pain shortness of breath any other acute symptoms or concerns discharged in stable and improved condition.      /64   Pulse 81   Temp 36.5 °C (97.7 °F)   Resp 16   Ht 1.473 m (4' 10\")   Wt 58.5 kg (129 lb)   SpO2 95%   BMI 26.96 kg/m²     Herson Garcia M.D.  [address] [place] [address]  [phone]    In 1 week      [facility], Emergency Dept  [address]  [place] [id number] [phone]    If symptoms worsen          FINAL IMPRESSION  1. Hypertensive urgency        1. Hypertensive Urgency  2. Headache    This dictation has been created using voice recognition software and/or scribes. The accuracy of the dictation is limited by the abilities of the software and the expertise of the scribes. I expect there may be some errors of grammar and possibly content. I made every attempt to manually correct the errors within my dictation. However, errors related to voice recognition software and/or scribes may still exist and should be interpreted within the appropriate context.

## 2021-01-08 DIAGNOSIS — Z23 NEED FOR VACCINATION: ICD-10-CM

## 2021-02-02 ENCOUNTER — HOSPITAL ENCOUNTER (OUTPATIENT)
Dept: LAB | Facility: MEDICAL CENTER | Age: 81
End: 2021-02-02
Attending: FAMILY MEDICINE
Payer: MEDICARE

## 2021-02-02 LAB
ALBUMIN SERPL BCP-MCNC: 4.3 G/DL (ref 3.2–4.9)
ALBUMIN/GLOB SERPL: 1.1 G/DL
ALP SERPL-CCNC: 109 U/L (ref 30–99)
ALT SERPL-CCNC: 11 U/L (ref 2–50)
ANION GAP SERPL CALC-SCNC: 13 MMOL/L (ref 7–16)
AST SERPL-CCNC: 22 U/L (ref 12–45)
BILIRUB SERPL-MCNC: 0.3 MG/DL (ref 0.1–1.5)
BUN SERPL-MCNC: 23 MG/DL (ref 8–22)
CALCIUM SERPL-MCNC: 10.1 MG/DL (ref 8.5–10.5)
CHLORIDE SERPL-SCNC: 98 MMOL/L (ref 96–112)
CHOLEST SERPL-MCNC: 149 MG/DL (ref 100–199)
CO2 SERPL-SCNC: 25 MMOL/L (ref 20–33)
CREAT SERPL-MCNC: 0.69 MG/DL (ref 0.5–1.4)
CRP SERPL HS-MCNC: 12.4 MG/L (ref 0–7.5)
ERYTHROCYTE [SEDIMENTATION RATE] IN BLOOD BY WESTERGREN METHOD: 66 MM/HOUR (ref 0–30)
GLOBULIN SER CALC-MCNC: 3.9 G/DL (ref 1.9–3.5)
GLUCOSE SERPL-MCNC: 107 MG/DL (ref 65–99)
HDLC SERPL-MCNC: 65 MG/DL
LDLC SERPL CALC-MCNC: 74 MG/DL
POTASSIUM SERPL-SCNC: 4.4 MMOL/L (ref 3.6–5.5)
PROT SERPL-MCNC: 8.2 G/DL (ref 6–8.2)
SODIUM SERPL-SCNC: 136 MMOL/L (ref 135–145)
T4 FREE SERPL-MCNC: 1.89 NG/DL (ref 0.93–1.7)
TRIGL SERPL-MCNC: 49 MG/DL (ref 0–149)
TSH SERPL DL<=0.005 MIU/L-ACNC: 0.11 UIU/ML (ref 0.38–5.33)

## 2021-02-02 PROCEDURE — 80053 COMPREHEN METABOLIC PANEL: CPT

## 2021-02-02 PROCEDURE — 80061 LIPID PANEL: CPT

## 2021-02-02 PROCEDURE — 84439 ASSAY OF FREE THYROXINE: CPT

## 2021-02-02 PROCEDURE — 85652 RBC SED RATE AUTOMATED: CPT

## 2021-02-02 PROCEDURE — 84443 ASSAY THYROID STIM HORMONE: CPT

## 2021-02-02 PROCEDURE — 86141 C-REACTIVE PROTEIN HS: CPT

## 2021-02-02 PROCEDURE — 83036 HEMOGLOBIN GLYCOSYLATED A1C: CPT

## 2021-02-02 PROCEDURE — 36415 COLL VENOUS BLD VENIPUNCTURE: CPT

## 2021-02-04 LAB
EST. AVERAGE GLUCOSE BLD GHB EST-MCNC: 134 MG/DL
HBA1C MFR BLD: 6.3 % (ref 0–5.6)

## 2021-03-31 ENCOUNTER — HOSPITAL ENCOUNTER (OUTPATIENT)
Dept: LAB | Facility: MEDICAL CENTER | Age: 81
End: 2021-03-31
Attending: FAMILY MEDICINE
Payer: MEDICARE

## 2021-03-31 LAB
BASOPHILS # BLD AUTO: 0.7 % (ref 0–1.8)
BASOPHILS # BLD: 0.06 K/UL (ref 0–0.12)
CRP SERPL HS-MCNC: 1.29 MG/DL (ref 0–0.75)
EOSINOPHIL # BLD AUTO: 0.27 K/UL (ref 0–0.51)
EOSINOPHIL NFR BLD: 3.1 % (ref 0–6.9)
ERYTHROCYTE [DISTWIDTH] IN BLOOD BY AUTOMATED COUNT: 45.9 FL (ref 35.9–50)
ERYTHROCYTE [SEDIMENTATION RATE] IN BLOOD BY WESTERGREN METHOD: 54 MM/HOUR (ref 0–30)
HCT VFR BLD AUTO: 39.8 % (ref 37–47)
HGB BLD-MCNC: 12.1 G/DL (ref 12–16)
IMM GRANULOCYTES # BLD AUTO: 0.03 K/UL (ref 0–0.11)
IMM GRANULOCYTES NFR BLD AUTO: 0.3 % (ref 0–0.9)
LYMPHOCYTES # BLD AUTO: 2.55 K/UL (ref 1–4.8)
LYMPHOCYTES NFR BLD: 28.9 % (ref 22–41)
MCH RBC QN AUTO: 27.6 PG (ref 27–33)
MCHC RBC AUTO-ENTMCNC: 30.4 G/DL (ref 33.6–35)
MCV RBC AUTO: 90.7 FL (ref 81.4–97.8)
MONOCYTES # BLD AUTO: 0.64 K/UL (ref 0–0.85)
MONOCYTES NFR BLD AUTO: 7.3 % (ref 0–13.4)
NEUTROPHILS # BLD AUTO: 5.26 K/UL (ref 2–7.15)
NEUTROPHILS NFR BLD: 59.7 % (ref 44–72)
NRBC # BLD AUTO: 0 K/UL
NRBC BLD-RTO: 0 /100 WBC
PLATELET # BLD AUTO: 303 K/UL (ref 164–446)
PMV BLD AUTO: 12 FL (ref 9–12.9)
RBC # BLD AUTO: 4.39 M/UL (ref 4.2–5.4)
T4 FREE SERPL-MCNC: 1.75 NG/DL (ref 0.93–1.7)
TSH SERPL DL<=0.005 MIU/L-ACNC: 0.23 UIU/ML (ref 0.38–5.33)
WBC # BLD AUTO: 8.8 K/UL (ref 4.8–10.8)

## 2021-03-31 PROCEDURE — 85025 COMPLETE CBC W/AUTO DIFF WBC: CPT

## 2021-03-31 PROCEDURE — 84443 ASSAY THYROID STIM HORMONE: CPT

## 2021-03-31 PROCEDURE — 84439 ASSAY OF FREE THYROXINE: CPT

## 2021-03-31 PROCEDURE — 85652 RBC SED RATE AUTOMATED: CPT

## 2021-03-31 PROCEDURE — 36415 COLL VENOUS BLD VENIPUNCTURE: CPT

## 2021-03-31 PROCEDURE — 86140 C-REACTIVE PROTEIN: CPT

## 2021-06-04 ENCOUNTER — HOSPITAL ENCOUNTER (OUTPATIENT)
Dept: RADIOLOGY | Facility: MEDICAL CENTER | Age: 81
End: 2021-06-04
Attending: NURSE PRACTITIONER
Payer: MEDICARE

## 2021-06-04 ENCOUNTER — OFFICE VISIT (OUTPATIENT)
Dept: URGENT CARE | Facility: PHYSICIAN GROUP | Age: 81
End: 2021-06-04
Payer: MEDICARE

## 2021-06-04 ENCOUNTER — HOSPITAL ENCOUNTER (OUTPATIENT)
Facility: MEDICAL CENTER | Age: 81
End: 2021-06-04
Attending: NURSE PRACTITIONER
Payer: MEDICARE

## 2021-06-04 VITALS
HEIGHT: 58 IN | TEMPERATURE: 100.8 F | OXYGEN SATURATION: 95 % | BODY MASS INDEX: 28.97 KG/M2 | SYSTOLIC BLOOD PRESSURE: 142 MMHG | WEIGHT: 138 LBS | HEART RATE: 111 BPM | DIASTOLIC BLOOD PRESSURE: 82 MMHG

## 2021-06-04 DIAGNOSIS — R68.83 CHILLS: ICD-10-CM

## 2021-06-04 DIAGNOSIS — R05.9 COUGH: ICD-10-CM

## 2021-06-04 DIAGNOSIS — J02.9 SORE THROAT: ICD-10-CM

## 2021-06-04 DIAGNOSIS — J34.89 NASAL CONGESTION WITH RHINORRHEA: ICD-10-CM

## 2021-06-04 DIAGNOSIS — R09.81 NASAL CONGESTION WITH RHINORRHEA: ICD-10-CM

## 2021-06-04 DIAGNOSIS — R50.9 FEVER, UNSPECIFIED FEVER CAUSE: ICD-10-CM

## 2021-06-04 LAB
FLUAV+FLUBV AG SPEC QL IA: NEGATIVE
INT CON NEG: NEGATIVE
INT CON NEG: NEGATIVE
INT CON POS: POSITIVE
INT CON POS: POSITIVE
S PYO AG THROAT QL: NEGATIVE

## 2021-06-04 PROCEDURE — 87804 INFLUENZA ASSAY W/OPTIC: CPT | Performed by: NURSE PRACTITIONER

## 2021-06-04 PROCEDURE — 71046 X-RAY EXAM CHEST 2 VIEWS: CPT

## 2021-06-04 PROCEDURE — U0003 INFECTIOUS AGENT DETECTION BY NUCLEIC ACID (DNA OR RNA); SEVERE ACUTE RESPIRATORY SYNDROME CORONAVIRUS 2 (SARS-COV-2) (CORONAVIRUS DISEASE [COVID-19]), AMPLIFIED PROBE TECHNIQUE, MAKING USE OF HIGH THROUGHPUT TECHNOLOGIES AS DESCRIBED BY CMS-2020-01-R: HCPCS

## 2021-06-04 PROCEDURE — U0005 INFEC AGEN DETEC AMPLI PROBE: HCPCS

## 2021-06-04 PROCEDURE — 87880 STREP A ASSAY W/OPTIC: CPT | Performed by: NURSE PRACTITIONER

## 2021-06-04 PROCEDURE — 99214 OFFICE O/P EST MOD 30 MIN: CPT | Performed by: NURSE PRACTITIONER

## 2021-06-04 RX ORDER — CODEINE PHOSPHATE AND GUAIFENESIN 10; 100 MG/5ML; MG/5ML
5 SOLUTION ORAL 2 TIMES DAILY PRN
Qty: 118 ML | Refills: 0 | Status: SHIPPED | OUTPATIENT
Start: 2021-06-04 | End: 2021-06-04

## 2021-06-04 RX ORDER — LORATADINE 10 MG/1
10 CAPSULE, LIQUID FILLED ORAL
Qty: 30 CAPSULE | Refills: 0 | Status: SHIPPED | OUTPATIENT
Start: 2021-06-04

## 2021-06-04 RX ORDER — ACETAMINOPHEN 500 MG
1000 TABLET ORAL ONCE
Status: COMPLETED | OUTPATIENT
Start: 2021-06-04 | End: 2021-06-04

## 2021-06-04 RX ADMIN — Medication 1000 MG: at 18:30

## 2021-06-04 ASSESSMENT — ENCOUNTER SYMPTOMS
SORE THROAT: 1
COUGH: 1
MYALGIAS: 1
NERVOUS/ANXIOUS: 0
EYE DISCHARGE: 0
ABDOMINAL PAIN: 0
NAUSEA: 0
FLANK PAIN: 0
WHEEZING: 0
DIZZINESS: 0
FEVER: 1
SHORTNESS OF BREATH: 0
WEAKNESS: 0
ORTHOPNEA: 0
CHILLS: 1
HEADACHES: 0
PALPITATIONS: 0
HEARTBURN: 0
VOMITING: 0
DIARRHEA: 0
SINUS PAIN: 0
EYE REDNESS: 0
CONSTIPATION: 0

## 2021-06-04 ASSESSMENT — FIBROSIS 4 INDEX: FIB4 SCORE: 1.73

## 2021-06-05 ENCOUNTER — TELEPHONE (OUTPATIENT)
Dept: URGENT CARE | Facility: PHYSICIAN GROUP | Age: 81
End: 2021-06-05

## 2021-06-05 DIAGNOSIS — J02.9 SORE THROAT: ICD-10-CM

## 2021-06-05 DIAGNOSIS — R05.9 COUGH: ICD-10-CM

## 2021-06-05 DIAGNOSIS — R68.83 CHILLS: ICD-10-CM

## 2021-06-05 NOTE — PROGRESS NOTES
Subjective:      Debra Stockton is a 81 y.o. female who presents with Sinus Problem (1x day:discharge, presure, ), Cough (chest discomfort, and back pain from the cough, productive cough but Pt states that its clear(1x day)), and Emesis (last night( 3 times because of cough) )            HPI  Patient states has maxillary sinus congestion, post nasal drainage, cough with clear phlegm, chills, body aches since yesterday. Lives alone in apartment, but has caregiver who comes mon-thurs to help clean, food prep. States caregiver has not been ill. No history of pneumonia or, asthma but has had history of bronchitis. Sore throat. Loss of appetite. Admits to poor water drinking today. No known exposoure to others with illness. Received 2nd COVID vaccine in 3/2021. No history of respiratory or lung problems. No nausea/vomiting or abdominal pain. Admits to vomiting from coughing only. No dysuria or changes in bowel movements. No meds taken for fever. Has used Robitussin for cough but has not helped. No NSAID use for chills, body aches. Citizen of [place] Speaker, niece present who is available for translation.     PMH:  has a past medical history of Arthritis, Dental disorder, Glaucoma, Heart burn, Hypertension, Indigestion, Other specified disorder of intestines, Unspecified cataract, Unspecified disorder of thyroid, Unspecified essential hypertension (10/30/2014), and Unspecified hypothyroidism (10/30/2014).  MEDS:   Current Outpatient Medications:   •  losartan-hydrochlorothiazide (HYZAAR) 100-12.5 MG per tablet, Take 1 Tab by mouth every day., Disp: , Rfl: 3  •  levothyroxine (SYNTHROID) 75 MCG Tab, Take 75 mcg by mouth Every morning on an empty stomach., Disp: , Rfl:   •  COMBIGAN 0.2-0.5 % Solution, INSTILL ONE DROP IN BOTH EYES TWICE DAILY, Disp: , Rfl: 4  •  dorzolamide (TRUSOPT) 2 % Solution, Place  in right eye 2 Times a Day., Disp: , Rfl:   •  TRAVATAN Z 0.004 % Solution, Place  in both eyes 2 Times a Day., Disp: , Rfl:    •  meclizine (ANTIVERT) 25 MG Tab, Take 1 Tab by mouth 3 times a day as needed., Disp: 90 Tab, Rfl: 3  •  docusate sodium 100 MG Cap, Take 100 mg by mouth 2 Times a Day. (Patient not taking: Reported on 6/4/2021), Disp: 60 Cap, Rfl:   •  benzocaine-menthol (CEPACOL) 15-3.6 MG Lozenge, Spray 1 Lozenge in mouth/throat every 2 hours as needed (for sore throat). (Patient not taking: Reported on 6/4/2021), Disp: , Rfl:   •  cyclobenzaprine (FLEXERIL) 5 MG tablet, Take 1 Tab by mouth 3 times a day as needed for Muscle Spasms. (Patient not taking: Reported on 10/30/2020), Disp: 30 Tab, Rfl: 0  •  Cholecalciferol (VITAMIN D PO), Take  by mouth every day. (Patient not taking: Reported on 6/4/2021), Disp: , Rfl:   •  CALCIUM PO, Take  by mouth every day. (Patient not taking: Reported on 6/4/2021), Disp: , Rfl:   ALLERGIES: No Known Allergies  SURGHX:   Past Surgical History:   Procedure Laterality Date   • CERVICAL DISK AND FUSION ANTERIOR N/A 6/25/2018    Procedure: CERVICAL DISK AND FUSION ANTERIOR/ C4-7;  Surgeon: Lester Doyle M.D.;  Location: [facility];  Service: Neurosurgery   • CORPECTOMY N/A 6/25/2018    Procedure: CORPECTOMY/ C5;  Surgeon: Lester Doyle M.D.;  Location: [facility];  Service: Neurosurgery   • BLEPHAROPLASTY Left 9/7/2016    Procedure:  PERMANENT TEMPORAL PARTIAL TARSORRHAPHY;  Surgeon: Alphonso Montoya M.D.;  Location: SURGERY [facility];  Service:    • EYE FOREIGN BODY REMOVAL Left 1/29/2016    Procedure: EYE FOREIGN BODY REMOVAL  FOR: REMOVAL OF UPPER EYELID WEIGHT IMPLANT;  Surgeon: Alphonso Montoya M.D.;  Location: SURGERY SAME DAY [facility];  Service:    • LUMBAR LAMINECTOMY DISKECTOMY Left 7/17/2015    Procedure: LUMBAR LAMINECTOMY DISKECTOMY POSTERIOR L5-S1 MICRO;  Surgeon: Abel Salazar M.D.;  Location: [facility];  Service:    • LID TIGHTENING  8/27/2014    Performed by Omer Servin M.D. at SURGERY [facility]   •  "EYE FOREIGN BODY REMOVAL  5/20/2014    Performed by Omer Servin M.D. at SURGERY [facility]   • OTHER  2004    left ear tumor removed   • APPENDECTOMY  1996   • OTHER  1952    tonsillectomy   • OTHER  2-3yrs ago    l eye  with multiple surgeries   • OTHER ABDOMINAL SURGERY  over 40 yrs ago    gavino wagner     SOCHX:  reports that she has never smoked. She has never used smokeless tobacco. She reports that she does not drink alcohol and does not use drugs.  FH: Family history was reviewed, no pertinent findings to report    Review of Systems   Constitutional: Positive for chills, fever and malaise/fatigue.   HENT: Positive for congestion and sore throat. Negative for ear pain and sinus pain.    Eyes: Negative for discharge and redness.   Respiratory: Positive for cough. Negative for shortness of breath and wheezing.    Cardiovascular: Negative for palpitations and orthopnea.   Gastrointestinal: Negative for abdominal pain, constipation, diarrhea, heartburn, nausea and vomiting.   Genitourinary: Negative for dysuria, flank pain, frequency, hematuria and urgency.   Musculoskeletal: Positive for myalgias.   Skin: Negative for itching and rash.   Neurological: Negative for dizziness, weakness and headaches.   Endo/Heme/Allergies: Negative for environmental allergies.   Psychiatric/Behavioral: The patient is not nervous/anxious.    All other systems reviewed and are negative.         Objective:     /82 (BP Location: Left arm, Patient Position: Sitting, BP Cuff Size: Adult)   Pulse (!) 111   Temp (!) 38.2 °C (100.8 °F) (Temporal)   Ht 1.473 m (4' 10\")   Wt 62.6 kg (138 lb)   SpO2 95%   BMI 28.84 kg/m²      Physical Exam  Vitals reviewed.   Constitutional:       General: She is awake. She is not in acute distress.     Appearance: Normal appearance. She is well-developed. She is not ill-appearing, toxic-appearing or diaphoretic.   HENT:      Head: Normocephalic.      Right Ear: Tympanic membrane, ear " canal and external ear normal.      Left Ear: Tympanic membrane, ear canal and external ear normal.      Nose: Congestion and rhinorrhea present. No nasal tenderness or mucosal edema.      Mouth/Throat:      Mouth: Mucous membranes are dry.      Pharynx: Oropharynx is clear. Uvula midline. Posterior oropharyngeal erythema present. No pharyngeal swelling, oropharyngeal exudate or uvula swelling.      Tonsils: No tonsillar exudate or tonsillar abscesses. 1+ on the right. 1+ on the left.   Eyes:      General: Lids are normal.      Extraocular Movements: Extraocular movements intact.      Conjunctiva/sclera: Conjunctivae normal.      Pupils: Pupils are equal, round, and reactive to light.   Cardiovascular:      Rate and Rhythm: Regular rhythm. Tachycardia present.      Heart sounds: Normal heart sounds, S1 normal and S2 normal. Heart sounds not distant. No murmur heard.   No friction rub. No gallop. No S4 sounds.    Pulmonary:      Effort: Pulmonary effort is normal. No tachypnea, accessory muscle usage or respiratory distress.      Breath sounds: Normal breath sounds and air entry. No stridor, decreased air movement or transmitted upper airway sounds. No decreased breath sounds, wheezing, rhonchi or rales.   Musculoskeletal:         General: Normal range of motion.      Cervical back: Normal range of motion and neck supple.   Skin:     General: Skin is warm and dry.   Neurological:      Mental Status: She is alert and oriented to person, place, and time.      GCS: GCS eye subscore is 4. GCS verbal subscore is 5. GCS motor subscore is 6.      Cranial Nerves: Cranial nerves are intact.      Sensory: Sensation is intact.      Motor: Motor function is intact.      Coordination: Coordination is intact.      Gait: Gait is intact.      Comments: Ambulates with walker, needs assistance to transfer from seat to stand. No noted dizziness or weakness.   Psychiatric:         Attention and Perception: Attention and perception  normal.         Mood and Affect: Mood and affect normal.         Speech: Speech normal.         Behavior: Behavior normal. Behavior is cooperative.         Thought Content: Thought content normal.         Cognition and Memory: Cognition and memory normal.         Judgment: Judgment normal.                   CXR:   FINDINGS:   LUNGS: Clear. No effusions.  PNEUMOTHORAX: None.  LINES AND TUBES: None.  MEDIASTINUM: No cardiomegaly. Atherosclerosis.  MUSCULOSKELETAL STRUCTURES: No acute displaced fracture. ACDF.   IMPRESSION:   No acute cardiopulmonary abnormality.      Assessment/Plan:        1. Cough    - POCT Influenza A/B  - SARS-CoV-2 PCR (24 hour In-House): Collect NP swab in VTM; Future  - DX-CHEST-2 VIEWS; Future  - Hydrocod Polst-CPM Polst ER (TUSSIONEX) 10-8 MG/5ML Suspension Extended Release; Take 5 mL by mouth every 12 hours as needed for Cough for up to 12 days. Causes drowsiness, be careful with ambulating.  Dispense: 115 mL; Refill: 0 (unable to print, patient/niece given paper Rx for this instead)    2. Chills    - POCT Rapid Strep A  - POCT Influenza A/B  - SARS-CoV-2 PCR (24 hour In-House): Collect NP swab in VTM; Future    3. Sore throat    - POCT Rapid Strep A  - POCT Influenza A/B  - SARS-CoV-2 PCR (24 hour In-House): Collect NP swab in VTM; Future    4. Fever, unspecified fever cause    - acetaminophen (TYLENOL) tablet 1,000 mg    5. Nasal congestion with rhinorrhea    - Loratadine (CLARITIN) 10 MG Cap; Take 10 mg by mouth 1 time a day as needed.  Dispense: 30 capsule; Refill: 0    Prognosis:  -Acute symptoms appear most likely viral URI with seasonal allergy component, acute new illness with unknown prognosis, negative for Strep, Influenza, Bronchitis or Pneumonia at this time  -Post nasal drainage may be a factor in cough, recommend nasal decongestant and allergy med at this time with cough suppressant at night as requested    Plan:  -Niece states pans to be at home with patient tonight due to  acute illness with fever and use of codeine cough syrup  -Discussed risk for fall and decreased respiratory rate with codeine cough syrup, caution with ambulating with walker assistance due to drowsiness, both patient and niece understand this  -Patient states prefers not to use nasal sprays and gargling with salt water  -Recommended allergy med as prescribed for nasal congestion with runny nose, saline nasal mist, salt water gargle and Tylenol for chills/body aches/fever for symptom management  -Declines throat numbing agent like lidocaine as she does not like to gargle  -Encourage hydration intake and light meals with soft foods if pain with swallowing  -Warm or cool drinks for comfort reasons with swallowing  -May use humidifier or vaporizer for cough/sore throat    Education (recommendations) as includes:  -Maintain hydration intake  -May use over the counter Tylenol as needed for fever, sore throat or body aches  -Encourage rest  -Salt water gargle as needed for sore throat  -May use over the counter Chloraseptic or throat lozenges as needed   -Flonase, saline nasal spray as needed for nasal congestion, runny nose  -Monitor for fevers, worse cough, shortness of breath, chest pain, chest tightness, thick nasal discharge- may return for re-evaluation in Urgent Care  Niece given paper Rx for codeine cough syrup at this time due to printer/computer difficulty with sending to pharmacy    -Recommend family to wear mask and 6 ft distance when possible when caring for patient if COVID +, will know this test result within the next 24 hrs, niece expresses understanding of this  Call dario Ramirez, [phone], may leave message for COVID test result, may take up to 24 hrs      -Reviewed chart for previous urgent care visits/external chart notes with similar symptoms pertain to today's chief complaint as well as labs for any indication for any pertinent info that may be contributing to currrent symptom, none found  just previous acute self limiting illness  -History of HTN, taking blood pressure meds, controlled  -Allergies reviewed and no contraindication for rx meds at this time   -PDMP verified/ reported reviewed     Discharge:  -Patient stable, vitals stable at exam time and at discharge    -Advised to follow-up with the primary care provider for recheck, reevaluation, and consideration of further management if necessary.   -Patient/niece expresses understanding to treatment and plan of care. -Questions were encouraged and answered. Recommended over the counter meds were written down as well when requested    -Time spent evaluating this patient was at least 30 minutes. This time comprises of the following: preparing for visit/chart review, patient history taken into account pertinent to symptoms, patient counseling/education for needed treatment outpatient, exam/evaluation/treatment plan provided, ordering appropriate lab/test/procedures/meds, independent interpretation of any clinic testing, medication management with any other listed medications and chart documentation.

## 2021-06-06 NOTE — TELEPHONE ENCOUNTER
Called and left message on provided niece phone as discussed at exam time regarding COVID test result that is negative. If symptoms change or worsen she may return to clinic for evaluation.

## 2021-06-08 ENCOUNTER — HOSPITAL ENCOUNTER (OUTPATIENT)
Dept: LAB | Facility: MEDICAL CENTER | Age: 81
End: 2021-06-08
Attending: FAMILY MEDICINE
Payer: MEDICARE

## 2021-06-08 LAB
CRP SERPL HS-MCNC: 27.9 MG/L (ref 0–7.5)
ERYTHROCYTE [SEDIMENTATION RATE] IN BLOOD BY WESTERGREN METHOD: 34 MM/HOUR (ref 0–25)
RHEUMATOID FACT SER IA-ACNC: 11 IU/ML (ref 0–14)
TSH SERPL DL<=0.005 MIU/L-ACNC: 0.37 UIU/ML (ref 0.38–5.33)

## 2021-06-08 PROCEDURE — 86038 ANTINUCLEAR ANTIBODIES: CPT

## 2021-06-08 PROCEDURE — 36415 COLL VENOUS BLD VENIPUNCTURE: CPT

## 2021-06-08 PROCEDURE — 85652 RBC SED RATE AUTOMATED: CPT

## 2021-06-08 PROCEDURE — 86141 C-REACTIVE PROTEIN HS: CPT

## 2021-06-08 PROCEDURE — 86431 RHEUMATOID FACTOR QUANT: CPT

## 2021-06-08 PROCEDURE — 84443 ASSAY THYROID STIM HORMONE: CPT

## 2021-06-11 LAB — NUCLEAR IGG SER QL IA: NORMAL

## 2021-07-05 ENCOUNTER — PATIENT OUTREACH (OUTPATIENT)
Dept: HEALTH INFORMATION MANAGEMENT | Facility: OTHER | Age: 81
End: 2021-07-05

## 2021-07-05 NOTE — PROGRESS NOTES
Outcome: Left Message  To schedule Comprehensive health  Assessment      Please transfer to Patient Outreach Team at [phone] when patient returns call.      Attempt # 1

## 2021-08-11 ENCOUNTER — HOSPITAL ENCOUNTER (OUTPATIENT)
Dept: LAB | Facility: MEDICAL CENTER | Age: 81
End: 2021-08-11
Attending: FAMILY MEDICINE
Payer: MEDICARE

## 2021-08-11 LAB
ALBUMIN SERPL BCP-MCNC: 3.9 G/DL (ref 3.2–4.9)
ALBUMIN/GLOB SERPL: 1.2 G/DL
ALP SERPL-CCNC: 86 U/L (ref 30–99)
ALT SERPL-CCNC: 9 U/L (ref 2–50)
ANION GAP SERPL CALC-SCNC: 8 MMOL/L (ref 7–16)
AST SERPL-CCNC: 14 U/L (ref 12–45)
BASOPHILS # BLD AUTO: 0.4 % (ref 0–1.8)
BASOPHILS # BLD: 0.04 K/UL (ref 0–0.12)
BILIRUB SERPL-MCNC: 0.4 MG/DL (ref 0.1–1.5)
BUN SERPL-MCNC: 21 MG/DL (ref 8–22)
CALCIUM SERPL-MCNC: 9.6 MG/DL (ref 8.5–10.5)
CHLORIDE SERPL-SCNC: 98 MMOL/L (ref 96–112)
CO2 SERPL-SCNC: 30 MMOL/L (ref 20–33)
CREAT SERPL-MCNC: 0.73 MG/DL (ref 0.5–1.4)
CRP SERPL HS-MCNC: 4 MG/L (ref 0–3)
EOSINOPHIL # BLD AUTO: 0.09 K/UL (ref 0–0.51)
EOSINOPHIL NFR BLD: 0.8 % (ref 0–6.9)
ERYTHROCYTE [DISTWIDTH] IN BLOOD BY AUTOMATED COUNT: 46.5 FL (ref 35.9–50)
ERYTHROCYTE [SEDIMENTATION RATE] IN BLOOD BY WESTERGREN METHOD: 13 MM/HOUR (ref 0–25)
GLOBULIN SER CALC-MCNC: 3.2 G/DL (ref 1.9–3.5)
GLUCOSE SERPL-MCNC: 102 MG/DL (ref 65–99)
HCT VFR BLD AUTO: 39.2 % (ref 37–47)
HGB BLD-MCNC: 12.4 G/DL (ref 12–16)
IMM GRANULOCYTES # BLD AUTO: 0.05 K/UL (ref 0–0.11)
IMM GRANULOCYTES NFR BLD AUTO: 0.5 % (ref 0–0.9)
LYMPHOCYTES # BLD AUTO: 3.76 K/UL (ref 1–4.8)
LYMPHOCYTES NFR BLD: 34 % (ref 22–41)
MCH RBC QN AUTO: 28.4 PG (ref 27–33)
MCHC RBC AUTO-ENTMCNC: 31.6 G/DL (ref 33.6–35)
MCV RBC AUTO: 89.7 FL (ref 81.4–97.8)
MONOCYTES # BLD AUTO: 0.61 K/UL (ref 0–0.85)
MONOCYTES NFR BLD AUTO: 5.5 % (ref 0–13.4)
NEUTROPHILS # BLD AUTO: 6.5 K/UL (ref 2–7.15)
NEUTROPHILS NFR BLD: 58.8 % (ref 44–72)
NRBC # BLD AUTO: 0 K/UL
NRBC BLD-RTO: 0 /100 WBC
PLATELET # BLD AUTO: 304 K/UL (ref 164–446)
PMV BLD AUTO: 10.9 FL (ref 9–12.9)
POTASSIUM SERPL-SCNC: 4.7 MMOL/L (ref 3.6–5.5)
PROT SERPL-MCNC: 7.1 G/DL (ref 6–8.2)
RBC # BLD AUTO: 4.37 M/UL (ref 4.2–5.4)
SODIUM SERPL-SCNC: 136 MMOL/L (ref 135–145)
T4 FREE SERPL-MCNC: 1.62 NG/DL (ref 0.93–1.7)
TSH SERPL DL<=0.005 MIU/L-ACNC: 0.93 UIU/ML (ref 0.38–5.33)
WBC # BLD AUTO: 11.1 K/UL (ref 4.8–10.8)

## 2021-08-11 PROCEDURE — 85025 COMPLETE CBC W/AUTO DIFF WBC: CPT

## 2021-08-11 PROCEDURE — 85652 RBC SED RATE AUTOMATED: CPT

## 2021-08-11 PROCEDURE — 86141 C-REACTIVE PROTEIN HS: CPT

## 2021-08-11 PROCEDURE — 84443 ASSAY THYROID STIM HORMONE: CPT

## 2021-08-11 PROCEDURE — 36415 COLL VENOUS BLD VENIPUNCTURE: CPT

## 2021-08-11 PROCEDURE — 84439 ASSAY OF FREE THYROXINE: CPT

## 2021-08-11 PROCEDURE — 80053 COMPREHEN METABOLIC PANEL: CPT

## 2021-09-28 ENCOUNTER — HOSPITAL ENCOUNTER (OUTPATIENT)
Dept: RADIOLOGY | Facility: MEDICAL CENTER | Age: 81
End: 2021-09-28
Attending: FAMILY MEDICINE
Payer: MEDICARE

## 2021-09-28 DIAGNOSIS — Z12.31 VISIT FOR SCREENING MAMMOGRAM: ICD-10-CM

## 2021-09-28 PROCEDURE — 77063 BREAST TOMOSYNTHESIS BI: CPT

## 2021-11-10 ENCOUNTER — HOSPITAL ENCOUNTER (OUTPATIENT)
Dept: LAB | Facility: MEDICAL CENTER | Age: 81
End: 2021-11-10
Attending: FAMILY MEDICINE
Payer: MEDICARE

## 2021-11-10 LAB
ALBUMIN SERPL BCP-MCNC: 4.5 G/DL (ref 3.2–4.9)
ALBUMIN/GLOB SERPL: 1.5 G/DL
ALP SERPL-CCNC: 93 U/L (ref 30–99)
ALT SERPL-CCNC: 11 U/L (ref 2–50)
ANION GAP SERPL CALC-SCNC: 7 MMOL/L (ref 7–16)
AST SERPL-CCNC: 17 U/L (ref 12–45)
BILIRUB SERPL-MCNC: 0.3 MG/DL (ref 0.1–1.5)
BUN SERPL-MCNC: 21 MG/DL (ref 8–22)
CALCIUM SERPL-MCNC: 9.6 MG/DL (ref 8.5–10.5)
CHLORIDE SERPL-SCNC: 104 MMOL/L (ref 96–112)
CHOLEST SERPL-MCNC: 168 MG/DL (ref 100–199)
CO2 SERPL-SCNC: 30 MMOL/L (ref 20–33)
CREAT SERPL-MCNC: 0.67 MG/DL (ref 0.5–1.4)
CRP SERPL HS-MCNC: 0.53 MG/DL (ref 0–0.75)
ERYTHROCYTE [SEDIMENTATION RATE] IN BLOOD BY WESTERGREN METHOD: 31 MM/HOUR (ref 0–25)
EST. AVERAGE GLUCOSE BLD GHB EST-MCNC: 140 MG/DL
GLOBULIN SER CALC-MCNC: 3 G/DL (ref 1.9–3.5)
GLUCOSE SERPL-MCNC: 115 MG/DL (ref 65–99)
HBA1C MFR BLD: 6.5 % (ref 4–5.6)
HDLC SERPL-MCNC: 74 MG/DL
LDLC SERPL CALC-MCNC: 76 MG/DL
POTASSIUM SERPL-SCNC: 4.4 MMOL/L (ref 3.6–5.5)
PROT SERPL-MCNC: 7.5 G/DL (ref 6–8.2)
SODIUM SERPL-SCNC: 141 MMOL/L (ref 135–145)
T4 FREE SERPL-MCNC: 1.6 NG/DL (ref 0.93–1.7)
TRIGL SERPL-MCNC: 90 MG/DL (ref 0–149)
TSH SERPL DL<=0.005 MIU/L-ACNC: 1.32 UIU/ML (ref 0.38–5.33)

## 2021-11-10 PROCEDURE — 36415 COLL VENOUS BLD VENIPUNCTURE: CPT

## 2021-11-10 PROCEDURE — 84439 ASSAY OF FREE THYROXINE: CPT

## 2021-11-10 PROCEDURE — 83036 HEMOGLOBIN GLYCOSYLATED A1C: CPT

## 2021-11-10 PROCEDURE — 85652 RBC SED RATE AUTOMATED: CPT

## 2021-11-10 PROCEDURE — 84443 ASSAY THYROID STIM HORMONE: CPT

## 2021-11-10 PROCEDURE — 80053 COMPREHEN METABOLIC PANEL: CPT

## 2021-11-10 PROCEDURE — 80061 LIPID PANEL: CPT

## 2021-11-10 PROCEDURE — 86140 C-REACTIVE PROTEIN: CPT

## 2021-11-15 NOTE — PROGRESS NOTES
Outcome: Left Message    Please transfer to Patient Outreach Team at [phone] when patient returns call.      Attempt # 2

## 2022-01-21 ENCOUNTER — HOSPITAL ENCOUNTER (OUTPATIENT)
Dept: RADIOLOGY | Facility: MEDICAL CENTER | Age: 82
End: 2022-01-21
Attending: FAMILY MEDICINE
Payer: MEDICARE

## 2022-01-21 DIAGNOSIS — M54.16 LUMBAR RADICULOPATHY: ICD-10-CM

## 2022-01-21 PROCEDURE — 72148 MRI LUMBAR SPINE W/O DYE: CPT

## 2022-05-12 ENCOUNTER — HOSPITAL ENCOUNTER (OUTPATIENT)
Dept: RADIOLOGY | Facility: MEDICAL CENTER | Age: 82
End: 2022-05-12
Attending: NURSE PRACTITIONER
Payer: MEDICARE

## 2022-05-12 DIAGNOSIS — D36.10 SCHWANNOMA: ICD-10-CM

## 2022-05-12 PROCEDURE — 70551 MRI BRAIN STEM W/O DYE: CPT

## 2022-06-14 ENCOUNTER — HOSPITAL ENCOUNTER (INPATIENT)
Facility: MEDICAL CENTER | Age: 82
LOS: 1 days | DRG: 178 | End: 2022-06-15
Attending: EMERGENCY MEDICINE | Admitting: INTERNAL MEDICINE
Payer: MEDICARE

## 2022-06-14 ENCOUNTER — APPOINTMENT (OUTPATIENT)
Dept: RADIOLOGY | Facility: MEDICAL CENTER | Age: 82
DRG: 178 | End: 2022-06-14
Attending: EMERGENCY MEDICINE
Payer: MEDICARE

## 2022-06-14 DIAGNOSIS — I10 ESSENTIAL HYPERTENSION: ICD-10-CM

## 2022-06-14 DIAGNOSIS — I10 HYPERTENSION, UNSPECIFIED TYPE: ICD-10-CM

## 2022-06-14 DIAGNOSIS — E87.79 OTHER HYPERVOLEMIA: ICD-10-CM

## 2022-06-14 DIAGNOSIS — U07.1 COVID-19 VIRUS INFECTION: ICD-10-CM

## 2022-06-14 DIAGNOSIS — R09.02 HYPOXEMIA: ICD-10-CM

## 2022-06-14 DIAGNOSIS — E03.9 HYPOTHYROIDISM, UNSPECIFIED TYPE: ICD-10-CM

## 2022-06-14 DIAGNOSIS — R79.89 TROPONIN I ABOVE REFERENCE RANGE: ICD-10-CM

## 2022-06-14 PROBLEM — J96.00 ACUTE RESPIRATORY FAILURE (HCC): Status: ACTIVE | Noted: 2022-06-14

## 2022-06-14 PROBLEM — I11.0 CONGESTIVE HEART FAILURE DUE TO HYPERTENSION (HCC): Status: ACTIVE | Noted: 2022-06-14

## 2022-06-14 PROBLEM — I50.9 ACUTE CONGESTIVE HEART FAILURE (HCC): Status: ACTIVE | Noted: 2022-06-14

## 2022-06-14 PROBLEM — F10.20 ALCOHOL DEPENDENCE (HCC): Status: ACTIVE | Noted: 2022-06-14

## 2022-06-14 LAB
ALBUMIN SERPL BCP-MCNC: 4.3 G/DL (ref 3.2–4.9)
ALBUMIN/GLOB SERPL: 1.1 G/DL
ALP SERPL-CCNC: 113 U/L (ref 30–99)
ALT SERPL-CCNC: 18 U/L (ref 2–50)
ANION GAP SERPL CALC-SCNC: 17 MMOL/L (ref 7–16)
AST SERPL-CCNC: 46 U/L (ref 12–45)
BASOPHILS # BLD AUTO: 0.6 % (ref 0–1.8)
BASOPHILS # BLD: 0.05 K/UL (ref 0–0.12)
BILIRUB SERPL-MCNC: 0.3 MG/DL (ref 0.1–1.5)
BUN SERPL-MCNC: 17 MG/DL (ref 8–22)
CALCIUM SERPL-MCNC: 9.3 MG/DL (ref 8.5–10.5)
CHLORIDE SERPL-SCNC: 94 MMOL/L (ref 96–112)
CO2 SERPL-SCNC: 20 MMOL/L (ref 20–33)
CREAT SERPL-MCNC: 0.96 MG/DL (ref 0.5–1.4)
EKG IMPRESSION: NORMAL
EOSINOPHIL # BLD AUTO: 0.01 K/UL (ref 0–0.51)
EOSINOPHIL NFR BLD: 0.1 % (ref 0–6.9)
ERYTHROCYTE [DISTWIDTH] IN BLOOD BY AUTOMATED COUNT: 44 FL (ref 35.9–50)
GFR SERPLBLD CREATININE-BSD FMLA CKD-EPI: 60 ML/MIN/1.73 M 2
GLOBULIN SER CALC-MCNC: 4 G/DL (ref 1.9–3.5)
GLUCOSE SERPL-MCNC: 114 MG/DL (ref 65–99)
HCT VFR BLD AUTO: 39.6 % (ref 37–47)
HGB BLD-MCNC: 12.7 G/DL (ref 12–16)
IMM GRANULOCYTES # BLD AUTO: 0.05 K/UL (ref 0–0.11)
IMM GRANULOCYTES NFR BLD AUTO: 0.6 % (ref 0–0.9)
LACTATE BLD-SCNC: 1.9 MMOL/L (ref 0.5–2)
LYMPHOCYTES # BLD AUTO: 1.24 K/UL (ref 1–4.8)
LYMPHOCYTES NFR BLD: 13.8 % (ref 22–41)
MAGNESIUM SERPL-MCNC: 1.6 MG/DL (ref 1.5–2.5)
MCH RBC QN AUTO: 27.3 PG (ref 27–33)
MCHC RBC AUTO-ENTMCNC: 32.1 G/DL (ref 33.6–35)
MCV RBC AUTO: 85 FL (ref 81.4–97.8)
MONOCYTES # BLD AUTO: 0.62 K/UL (ref 0–0.85)
MONOCYTES NFR BLD AUTO: 6.9 % (ref 0–13.4)
NEUTROPHILS # BLD AUTO: 7.01 K/UL (ref 2–7.15)
NEUTROPHILS NFR BLD: 78 % (ref 44–72)
NRBC # BLD AUTO: 0 K/UL
NRBC BLD-RTO: 0 /100 WBC
NT-PROBNP SERPL IA-MCNC: 1016 PG/ML (ref 0–125)
PLATELET # BLD AUTO: 222 K/UL (ref 164–446)
PMV BLD AUTO: 10.9 FL (ref 9–12.9)
POTASSIUM SERPL-SCNC: 3.7 MMOL/L (ref 3.6–5.5)
PROCALCITONIN SERPL-MCNC: 0.07 NG/ML
PROT SERPL-MCNC: 8.3 G/DL (ref 6–8.2)
RBC # BLD AUTO: 4.66 M/UL (ref 4.2–5.4)
SODIUM SERPL-SCNC: 131 MMOL/L (ref 135–145)
TROPONIN T SERPL-MCNC: 33 NG/L (ref 6–19)
TROPONIN T SERPL-MCNC: 73 NG/L (ref 6–19)
TSH SERPL DL<=0.005 MIU/L-ACNC: 4.37 UIU/ML (ref 0.38–5.33)
WBC # BLD AUTO: 9 K/UL (ref 4.8–10.8)

## 2022-06-14 PROCEDURE — 71045 X-RAY EXAM CHEST 1 VIEW: CPT

## 2022-06-14 PROCEDURE — 83880 ASSAY OF NATRIURETIC PEPTIDE: CPT

## 2022-06-14 PROCEDURE — 99285 EMERGENCY DEPT VISIT HI MDM: CPT

## 2022-06-14 PROCEDURE — 96375 TX/PRO/DX INJ NEW DRUG ADDON: CPT

## 2022-06-14 PROCEDURE — 84145 PROCALCITONIN (PCT): CPT

## 2022-06-14 PROCEDURE — 770020 HCHG ROOM/CARE - TELE (206)

## 2022-06-14 PROCEDURE — 83735 ASSAY OF MAGNESIUM: CPT

## 2022-06-14 PROCEDURE — 84484 ASSAY OF TROPONIN QUANT: CPT

## 2022-06-14 PROCEDURE — 85025 COMPLETE CBC W/AUTO DIFF WBC: CPT

## 2022-06-14 PROCEDURE — 93005 ELECTROCARDIOGRAM TRACING: CPT | Performed by: EMERGENCY MEDICINE

## 2022-06-14 PROCEDURE — 96374 THER/PROPH/DIAG INJ IV PUSH: CPT

## 2022-06-14 PROCEDURE — 87040 BLOOD CULTURE FOR BACTERIA: CPT

## 2022-06-14 PROCEDURE — 700111 HCHG RX REV CODE 636 W/ 250 OVERRIDE (IP): Performed by: EMERGENCY MEDICINE

## 2022-06-14 PROCEDURE — 84443 ASSAY THYROID STIM HORMONE: CPT

## 2022-06-14 PROCEDURE — 700105 HCHG RX REV CODE 258: Performed by: EMERGENCY MEDICINE

## 2022-06-14 PROCEDURE — 99223 1ST HOSP IP/OBS HIGH 75: CPT | Mod: AI | Performed by: INTERNAL MEDICINE

## 2022-06-14 PROCEDURE — 83605 ASSAY OF LACTIC ACID: CPT

## 2022-06-14 PROCEDURE — 36415 COLL VENOUS BLD VENIPUNCTURE: CPT

## 2022-06-14 PROCEDURE — 80053 COMPREHEN METABOLIC PANEL: CPT

## 2022-06-14 RX ORDER — BISACODYL 10 MG
10 SUPPOSITORY, RECTAL RECTAL
Status: DISCONTINUED | OUTPATIENT
Start: 2022-06-14 | End: 2022-06-15 | Stop reason: HOSPADM

## 2022-06-14 RX ORDER — ONDANSETRON 4 MG/1
4 TABLET, ORALLY DISINTEGRATING ORAL EVERY 4 HOURS PRN
Status: DISCONTINUED | OUTPATIENT
Start: 2022-06-14 | End: 2022-06-14

## 2022-06-14 RX ORDER — LISINOPRIL 20 MG/1
20 TABLET ORAL 2 TIMES DAILY
Status: DISCONTINUED | OUTPATIENT
Start: 2022-06-14 | End: 2022-06-15 | Stop reason: HOSPADM

## 2022-06-14 RX ORDER — HYDROCHLOROTHIAZIDE 12.5 MG/1
12.5 TABLET ORAL 2 TIMES DAILY
Status: DISCONTINUED | OUTPATIENT
Start: 2022-06-14 | End: 2022-06-15 | Stop reason: HOSPADM

## 2022-06-14 RX ORDER — ACETAMINOPHEN 325 MG/1
650 TABLET ORAL EVERY 6 HOURS PRN
Status: DISCONTINUED | OUTPATIENT
Start: 2022-06-14 | End: 2022-06-15 | Stop reason: HOSPADM

## 2022-06-14 RX ORDER — HYDRALAZINE HYDROCHLORIDE 20 MG/ML
10 INJECTION INTRAMUSCULAR; INTRAVENOUS ONCE
Status: COMPLETED | OUTPATIENT
Start: 2022-06-14 | End: 2022-06-14

## 2022-06-14 RX ORDER — FUROSEMIDE 10 MG/ML
40 INJECTION INTRAMUSCULAR; INTRAVENOUS EVERY 8 HOURS
Status: DISCONTINUED | OUTPATIENT
Start: 2022-06-14 | End: 2022-06-15

## 2022-06-14 RX ORDER — SODIUM CHLORIDE 9 MG/ML
500 INJECTION, SOLUTION INTRAVENOUS ONCE
Status: COMPLETED | OUTPATIENT
Start: 2022-06-14 | End: 2022-06-14

## 2022-06-14 RX ORDER — LISINOPRIL AND HYDROCHLOROTHIAZIDE 20; 12.5 MG/1; MG/1
1 TABLET ORAL 2 TIMES DAILY
Status: SHIPPED | COMMUNITY
End: 2023-08-31

## 2022-06-14 RX ORDER — ONDANSETRON 4 MG/1
4 TABLET, ORALLY DISINTEGRATING ORAL EVERY 6 HOURS PRN
Status: DISCONTINUED | OUTPATIENT
Start: 2022-06-14 | End: 2022-06-15 | Stop reason: HOSPADM

## 2022-06-14 RX ORDER — LISINOPRIL 10 MG/1
10 TABLET ORAL
Status: DISCONTINUED | OUTPATIENT
Start: 2022-06-15 | End: 2022-06-14

## 2022-06-14 RX ORDER — METOPROLOL SUCCINATE 25 MG/1
25 TABLET, EXTENDED RELEASE ORAL
Status: DISCONTINUED | OUTPATIENT
Start: 2022-06-14 | End: 2022-06-15 | Stop reason: HOSPADM

## 2022-06-14 RX ORDER — ONDANSETRON 2 MG/ML
4 INJECTION INTRAMUSCULAR; INTRAVENOUS EVERY 4 HOURS PRN
Status: DISCONTINUED | OUTPATIENT
Start: 2022-06-14 | End: 2022-06-14

## 2022-06-14 RX ORDER — DEXAMETHASONE SODIUM PHOSPHATE 4 MG/ML
10 INJECTION, SOLUTION INTRA-ARTICULAR; INTRALESIONAL; INTRAMUSCULAR; INTRAVENOUS; SOFT TISSUE ONCE
Status: COMPLETED | OUTPATIENT
Start: 2022-06-14 | End: 2022-06-14

## 2022-06-14 RX ORDER — HYDRALAZINE HYDROCHLORIDE 20 MG/ML
10 INJECTION INTRAMUSCULAR; INTRAVENOUS EVERY 4 HOURS PRN
Status: DISCONTINUED | OUTPATIENT
Start: 2022-06-14 | End: 2022-06-15 | Stop reason: HOSPADM

## 2022-06-14 RX ORDER — POTASSIUM CHLORIDE 20 MEQ/1
20 TABLET, EXTENDED RELEASE ORAL 2 TIMES DAILY
Status: DISCONTINUED | OUTPATIENT
Start: 2022-06-14 | End: 2022-06-15 | Stop reason: HOSPADM

## 2022-06-14 RX ORDER — LOSARTAN POTASSIUM AND HYDROCHLOROTHIAZIDE 12.5; 1 MG/1; MG/1
1 TABLET ORAL
Status: DISCONTINUED | OUTPATIENT
Start: 2022-06-14 | End: 2022-06-14

## 2022-06-14 RX ORDER — FLUTICASONE PROPIONATE 50 MCG
2 SPRAY, SUSPENSION (ML) NASAL DAILY
Status: DISCONTINUED | OUTPATIENT
Start: 2022-06-15 | End: 2022-06-14

## 2022-06-14 RX ORDER — POLYETHYLENE GLYCOL 3350 17 G/17G
1 POWDER, FOR SOLUTION ORAL
Status: DISCONTINUED | OUTPATIENT
Start: 2022-06-14 | End: 2022-06-15 | Stop reason: HOSPADM

## 2022-06-14 RX ORDER — AMOXICILLIN 250 MG
2 CAPSULE ORAL 2 TIMES DAILY
Status: DISCONTINUED | OUTPATIENT
Start: 2022-06-14 | End: 2022-06-15 | Stop reason: HOSPADM

## 2022-06-14 RX ORDER — HYDRALAZINE HYDROCHLORIDE 20 MG/ML
10 INJECTION INTRAMUSCULAR; INTRAVENOUS EVERY 4 HOURS PRN
Status: DISCONTINUED | OUTPATIENT
Start: 2022-06-14 | End: 2022-06-14

## 2022-06-14 RX ORDER — ONDANSETRON 2 MG/ML
4 INJECTION INTRAMUSCULAR; INTRAVENOUS EVERY 6 HOURS PRN
Status: DISCONTINUED | OUTPATIENT
Start: 2022-06-14 | End: 2022-06-15 | Stop reason: HOSPADM

## 2022-06-14 RX ADMIN — DEXAMETHASONE SODIUM PHOSPHATE 10 MG: 4 INJECTION, SOLUTION INTRA-ARTICULAR; INTRALESIONAL; INTRAMUSCULAR; INTRAVENOUS; SOFT TISSUE at 17:23

## 2022-06-14 RX ADMIN — SODIUM CHLORIDE 500 ML: 9 INJECTION, SOLUTION INTRAVENOUS at 17:22

## 2022-06-14 RX ADMIN — HYDRALAZINE HYDROCHLORIDE 10 MG: 20 INJECTION INTRAMUSCULAR; INTRAVENOUS at 18:49

## 2022-06-14 ASSESSMENT — ENCOUNTER SYMPTOMS
INSOMNIA: 0
STRIDOR: 0
SHORTNESS OF BREATH: 1
DOUBLE VISION: 0
CLAUDICATION: 0
HEADACHES: 0
HEMOPTYSIS: 0
COUGH: 1
MYALGIAS: 0
NECK PAIN: 0
WHEEZING: 0
NAUSEA: 0
VOMITING: 0
FEVER: 0
DEPRESSION: 0
BRUISES/BLEEDS EASILY: 0
PND: 1
WEIGHT LOSS: 0
SPUTUM PRODUCTION: 0
BLURRED VISION: 0
ORTHOPNEA: 1
DIZZINESS: 0
PALPITATIONS: 0
SORE THROAT: 0

## 2022-06-14 ASSESSMENT — LIFESTYLE VARIABLES
TOTAL SCORE: 0
TOTAL SCORE: 0
HAVE YOU EVER FELT YOU SHOULD CUT DOWN ON YOUR DRINKING: NO
CONSUMPTION TOTAL: NEGATIVE
HOW MANY TIMES IN THE PAST YEAR HAVE YOU HAD 5 OR MORE DRINKS IN A DAY: 0
AVERAGE NUMBER OF DAYS PER WEEK YOU HAVE A DRINK CONTAINING ALCOHOL: 0
ON A TYPICAL DAY WHEN YOU DRINK ALCOHOL HOW MANY DRINKS DO YOU HAVE: 0
ALCOHOL_USE: NO
EVER FELT BAD OR GUILTY ABOUT YOUR DRINKING: NO
EVER HAD A DRINK FIRST THING IN THE MORNING TO STEADY YOUR NERVES TO GET RID OF A HANGOVER: NO
TOTAL SCORE: 0
HAVE PEOPLE ANNOYED YOU BY CRITICIZING YOUR DRINKING: NO

## 2022-06-14 ASSESSMENT — PATIENT HEALTH QUESTIONNAIRE - PHQ9
SUM OF ALL RESPONSES TO PHQ9 QUESTIONS 1 AND 2: 0
1. LITTLE INTEREST OR PLEASURE IN DOING THINGS: NOT AT ALL
2. FEELING DOWN, DEPRESSED, IRRITABLE, OR HOPELESS: NOT AT ALL

## 2022-06-14 ASSESSMENT — FIBROSIS 4 INDEX
FIB4 SCORE: 1.35
FIB4 SCORE: 3.91

## 2022-06-14 NOTE — ED PROVIDER NOTES
ED Provider Note    Scribed for Nahomi Sawyer M.D. by Soledad Paredes. 6/14/2022, 4:37 PM.    Primary care provider: Herson Garcia M.D.  Means of arrival: EMS  History obtained from: EMS and Patient  History limited by: None pertinent    CHIEF COMPLAINT  Chief Complaint   Patient presents with   • Hypertension   • Shortness of Breath     Pt was diagnosed with COVID yesterday. Reports increasing SOB since this AM. Pt requiring oxygen to maintain saturations. Pt also reporting dizziness.       HPI  Debra Stockton is a 82 y.o. female who presents to the Emergency Department with hypertension and shortness of breath onset onset this morning. She endorses associated cough (4 days ago), but denies any fever. No alleviating factors attempted.  Patient reports that she took a home COVID test yesterday that was positive, also went to an outpatient clinic which confirmed that she was positive. The patient adds that she lives at home by herself.  The patient was also noted to be hypertensive on arrival, she reports she is compliant with her blood pressure and thyroid medications. History obtained with .     REVIEW OF SYSTEMS  Review of Systems   Constitutional: Negative for fever.   Respiratory: Positive for cough and shortness of breath.    Cardiovascular:        Positive for hypertension.     All other systems reviewed and are negative.        PAST MEDICAL HISTORY   has a past medical history of Arthritis, Dental disorder, Glaucoma, Heart burn, Hypertension, Indigestion, Other specified disorder of intestines, Unspecified cataract, Unspecified disorder of thyroid, Unspecified essential hypertension (10/30/2014), and Unspecified hypothyroidism (10/30/2014).    SURGICAL HISTORY   has a past surgical history that includes other abdominal surgery (over 40 yrs ago); eye foreign body removal (5/20/2014); appendectomy (1996); lid tightening (8/27/2014); lumbar laminectomy diskectomy (Left, 7/17/2015); other  "(2-3yrs ago); other (2004); other (1952); eye foreign body removal (Left, 1/29/2016); blepharoplasty (Left, 9/7/2016); cervical disk and fusion anterior (N/A, 6/25/2018); and corpectomy (N/A, 6/25/2018).    SOCIAL HISTORY  Social History     Tobacco Use   • Smoking status: Never Smoker   • Smokeless tobacco: Never Used   Vaping Use   • Vaping Use: Never used   Substance Use Topics   • Alcohol use: No   • Drug use: No      Social History     Substance and Sexual Activity   Drug Use No       FAMILY HISTORY  Family History   Problem Relation Age of Onset   • Diabetes Mother    • Cancer Father    • Diabetes Father    • Diabetes Brother        CURRENT MEDICATIONS  See medication list    ALLERGIES  No Known Allergies    PHYSICAL EXAM  VITAL SIGNS: BP (!) 192/106   Pulse (!) 108   Resp (!) 21   Ht 1.473 m (4' 10\")   Wt 54.4 kg (120 lb)   SpO2 98%   BMI 25.08 kg/m²   Vitals reviewed by myself.  Nursing note and vitals reviewed.  Constitutional: Well-developed and well-nourished.  Mild distress.   HENT: Head is normocephalic and atraumatic.  Eyes: extra-ocular movements intact  Cardiovascular: Tachycardic rate and regular rhythm. No murmur heard.  Pulmonary/Chest: coarse rhonchi in bialteral lung fields  Abdominal: Soft and non-tender. No distention.    Musculoskeletal: Extremities exhibit normal range of motion without edema or tenderness.   Neurological: Awake and alert  Skin: Skin is warm and dry. No rash.     DIAGNOSTIC STUDIES /  LABS  Labs Reviewed   CBC WITH DIFFERENTIAL - Abnormal; Notable for the following components:       Result Value    MCHC 32.1 (*)     Neutrophils-Polys 78.00 (*)     Lymphocytes 13.80 (*)     All other components within normal limits   LACTIC ACID   COMP METABOLIC PANEL   BLOOD CULTURE    Narrative:     Per Hospital Policy: Only change Specimen Src: to \"Line\" if  specified by physician order.   BLOOD CULTURE    Narrative:     Per Hospital Policy: Only change Specimen Src: to \"Line\" " if  specified by physician order.   TROPONIN   PROCALCITONIN   TSH   PROBRAIN NATRIURETIC PEPTIDE, NT   TROPONIN       All labs reviewed by me.    EKG Interpretation:  Interpreted by myself    12 Lead EKG interpreted by me to show:  EKG at 4:55 PM: Sinus tachycardia, heart rate 106, normal axis, normal intervals, , QRS 88, QTc 425, no acute ST-T segment changes, no evidence of acute arrhythmia or ischemia  My impression of this EKG: Does not indicate ischemia or arrhythmia at this time.    RADIOLOGY  DX-CHEST-PORTABLE (1 VIEW)   Final Result      No acute cardiopulmonary abnormality.           The radiologist's interpretation of all radiological studies have been reviewed by me.    REASSESSMENT  4:47 PM - Patient was seen at bedside. The patient is currently on oxygen. I updated her on the plan of care including likely hospitalization. Patient verbalizes understanding and agreement to this plan of care.     HYDRATION: Based on the patient's presentation of Tachycardia the patient was given IV fluids. IV Hydration was used because oral hydration was not adequate alone. Upon recheck following hydration, the patient was improved.    COURSE & MEDICAL DECISION MAKING  Nursing notes, VS, PMSFHx reviewed in chart.    Patient is a 82-year-old female who comes in for evaluation of cough, shortness of breath.  Differential diagnosis includes viral syndrome, pneumonia, upper respiratory infection.  Diagnostic work-up includes labs and chest x-ray.    Patient's initial vitals notable for tachycardia, she is also mildly tachypneic.  Was hypoxic per EMS requiring 2 L by nasal cannula.  Given known COVID diagnosis patient is started on Decadron as she is hypoxic.  Chest x-ray returns and demonstrates no acute cardiopulmonary processes.  Lactic acid is within normal limits.  EKG returns demonstrates no evidence of acute arrhythmia or ischemia.  White count is within normal limits.  Patient will be hospitalized for hypoxemia  in the setting of COVID infection.  Discussed the case with Dr. Alamo who is excepted patient for hospitalization.  Of note troponin and metabolic panel are pending at time of hospitalization.  Dr. Alamo is aware of this we will follow-up with labs.  Patient is in guarded condition.      FINAL IMPRESSION  1. COVID-19 virus infection    2. Hypoxemia    3. Hypertension, unspecified type          I, Soledad Paredes (Scribe), silvio scribing for, and in the presence of, Nahomi Sawyer M.D..    Electronically signed by: Soledad Paredes (Scribe), 6/14/2022    I, Nahomi Sawyer M.D. personally performed the services described in this documentation, as scribed by Soledad Paredes in my presence, and it is both accurate and complete. C    The note accurately reflects work and decisions made by me.  Nahomi Sawyer M.D.  6/14/2022  6:45 PM

## 2022-06-14 NOTE — ED TRIAGE NOTES
Chief Complaint   Patient presents with   • Hypertension   • Shortness of Breath     Pt was diagnosed with COVID yesterday. Reports increasing SOB since this AM. Pt requiring oxygen to maintain saturations. Pt also reporting dizziness.

## 2022-06-15 ENCOUNTER — PATIENT OUTREACH (OUTPATIENT)
Dept: SCHEDULING | Facility: IMAGING CENTER | Age: 82
End: 2022-06-15

## 2022-06-15 ENCOUNTER — PHARMACY VISIT (OUTPATIENT)
Dept: PHARMACY | Facility: MEDICAL CENTER | Age: 82
End: 2022-06-15
Payer: COMMERCIAL

## 2022-06-15 ENCOUNTER — APPOINTMENT (OUTPATIENT)
Dept: CARDIOLOGY | Facility: MEDICAL CENTER | Age: 82
DRG: 178 | End: 2022-06-15
Attending: INTERNAL MEDICINE
Payer: MEDICARE

## 2022-06-15 VITALS
BODY MASS INDEX: 28.32 KG/M2 | HEIGHT: 58 IN | RESPIRATION RATE: 18 BRPM | TEMPERATURE: 98.1 F | DIASTOLIC BLOOD PRESSURE: 60 MMHG | HEART RATE: 72 BPM | SYSTOLIC BLOOD PRESSURE: 118 MMHG | WEIGHT: 134.92 LBS | OXYGEN SATURATION: 95 %

## 2022-06-15 PROBLEM — R79.89 TROPONIN I ABOVE REFERENCE RANGE: Status: RESOLVED | Noted: 2022-06-14 | Resolved: 2022-06-15

## 2022-06-15 PROBLEM — J96.00 ACUTE RESPIRATORY FAILURE (HCC): Status: RESOLVED | Noted: 2022-06-14 | Resolved: 2022-06-15

## 2022-06-15 PROBLEM — E87.70 FLUID OVERLOAD: Status: ACTIVE | Noted: 2022-06-14

## 2022-06-15 PROBLEM — E87.70 FLUID OVERLOAD: Status: RESOLVED | Noted: 2022-06-14 | Resolved: 2022-06-15

## 2022-06-15 LAB
ALBUMIN SERPL BCP-MCNC: 3.8 G/DL (ref 3.2–4.9)
ALBUMIN/GLOB SERPL: 1.1 G/DL
ALP SERPL-CCNC: 96 U/L (ref 30–99)
ALT SERPL-CCNC: 19 U/L (ref 2–50)
ANION GAP SERPL CALC-SCNC: 18 MMOL/L (ref 7–16)
AST SERPL-CCNC: 47 U/L (ref 12–45)
BASOPHILS # BLD AUTO: 0.3 % (ref 0–1.8)
BASOPHILS # BLD: 0.02 K/UL (ref 0–0.12)
BILIRUB SERPL-MCNC: 0.2 MG/DL (ref 0.1–1.5)
BUN SERPL-MCNC: 18 MG/DL (ref 8–22)
CALCIUM SERPL-MCNC: 8.9 MG/DL (ref 8.5–10.5)
CHLORIDE SERPL-SCNC: 94 MMOL/L (ref 96–112)
CO2 SERPL-SCNC: 20 MMOL/L (ref 20–33)
CREAT SERPL-MCNC: 0.74 MG/DL (ref 0.5–1.4)
EOSINOPHIL # BLD AUTO: 0 K/UL (ref 0–0.51)
EOSINOPHIL NFR BLD: 0 % (ref 0–6.9)
ERYTHROCYTE [DISTWIDTH] IN BLOOD BY AUTOMATED COUNT: 45.4 FL (ref 35.9–50)
GFR SERPLBLD CREATININE-BSD FMLA CKD-EPI: 82 ML/MIN/1.73 M 2
GLOBULIN SER CALC-MCNC: 3.6 G/DL (ref 1.9–3.5)
GLUCOSE SERPL-MCNC: 140 MG/DL (ref 65–99)
HCT VFR BLD AUTO: 39.1 % (ref 37–47)
HGB BLD-MCNC: 12.2 G/DL (ref 12–16)
IMM GRANULOCYTES # BLD AUTO: 0.02 K/UL (ref 0–0.11)
IMM GRANULOCYTES NFR BLD AUTO: 0.3 % (ref 0–0.9)
LV EJECT FRACT  99904: 70
LV EJECT FRACT MOD 2C 99903: 63.03
LV EJECT FRACT MOD 4C 99902: 79.46
LV EJECT FRACT MOD BP 99901: 71.58
LYMPHOCYTES # BLD AUTO: 1.08 K/UL (ref 1–4.8)
LYMPHOCYTES NFR BLD: 15.9 % (ref 22–41)
MAGNESIUM SERPL-MCNC: 1.5 MG/DL (ref 1.5–2.5)
MCH RBC QN AUTO: 27.4 PG (ref 27–33)
MCHC RBC AUTO-ENTMCNC: 31.2 G/DL (ref 33.6–35)
MCV RBC AUTO: 87.7 FL (ref 81.4–97.8)
MONOCYTES # BLD AUTO: 0.12 K/UL (ref 0–0.85)
MONOCYTES NFR BLD AUTO: 1.8 % (ref 0–13.4)
NEUTROPHILS # BLD AUTO: 5.55 K/UL (ref 2–7.15)
NEUTROPHILS NFR BLD: 81.7 % (ref 44–72)
NRBC # BLD AUTO: 0 K/UL
NRBC BLD-RTO: 0 /100 WBC
NT-PROBNP SERPL IA-MCNC: 4092 PG/ML (ref 0–125)
PLATELET # BLD AUTO: 200 K/UL (ref 164–446)
PMV BLD AUTO: 11 FL (ref 9–12.9)
POTASSIUM SERPL-SCNC: 3.7 MMOL/L (ref 3.6–5.5)
PROT SERPL-MCNC: 7.4 G/DL (ref 6–8.2)
RBC # BLD AUTO: 4.46 M/UL (ref 4.2–5.4)
SODIUM SERPL-SCNC: 132 MMOL/L (ref 135–145)
TROPONIN T SERPL-MCNC: 58 NG/L (ref 6–19)
TROPONIN T SERPL-MCNC: 67 NG/L (ref 6–19)
TROPONIN T SERPL-MCNC: 81 NG/L (ref 6–19)
WBC # BLD AUTO: 6.8 K/UL (ref 4.8–10.8)

## 2022-06-15 PROCEDURE — A9270 NON-COVERED ITEM OR SERVICE: HCPCS | Performed by: INTERNAL MEDICINE

## 2022-06-15 PROCEDURE — 96376 TX/PRO/DX INJ SAME DRUG ADON: CPT

## 2022-06-15 PROCEDURE — 83880 ASSAY OF NATRIURETIC PEPTIDE: CPT

## 2022-06-15 PROCEDURE — 85025 COMPLETE CBC W/AUTO DIFF WBC: CPT

## 2022-06-15 PROCEDURE — RXMED WILLOW AMBULATORY MEDICATION CHARGE: Performed by: HOSPITALIST

## 2022-06-15 PROCEDURE — 84484 ASSAY OF TROPONIN QUANT: CPT | Mod: 91

## 2022-06-15 PROCEDURE — 93306 TTE W/DOPPLER COMPLETE: CPT

## 2022-06-15 PROCEDURE — 80053 COMPREHEN METABOLIC PANEL: CPT

## 2022-06-15 PROCEDURE — 700102 HCHG RX REV CODE 250 W/ 637 OVERRIDE(OP): Performed by: INTERNAL MEDICINE

## 2022-06-15 PROCEDURE — 700111 HCHG RX REV CODE 636 W/ 250 OVERRIDE (IP): Performed by: INTERNAL MEDICINE

## 2022-06-15 PROCEDURE — 99239 HOSP IP/OBS DSCHRG MGMT >30: CPT | Performed by: HOSPITALIST

## 2022-06-15 PROCEDURE — 700111 HCHG RX REV CODE 636 W/ 250 OVERRIDE (IP): Performed by: HOSPITALIST

## 2022-06-15 PROCEDURE — 96375 TX/PRO/DX INJ NEW DRUG ADDON: CPT

## 2022-06-15 PROCEDURE — 93306 TTE W/DOPPLER COMPLETE: CPT | Mod: 26 | Performed by: INTERNAL MEDICINE

## 2022-06-15 PROCEDURE — 83735 ASSAY OF MAGNESIUM: CPT

## 2022-06-15 RX ORDER — FUROSEMIDE 10 MG/ML
80 INJECTION INTRAMUSCULAR; INTRAVENOUS ONCE
Status: COMPLETED | OUTPATIENT
Start: 2022-06-15 | End: 2022-06-15

## 2022-06-15 RX ORDER — METOPROLOL SUCCINATE 25 MG/1
25 TABLET, EXTENDED RELEASE ORAL DAILY
Qty: 30 TABLET | Refills: 3 | Status: SHIPPED | OUTPATIENT
Start: 2022-06-16 | End: 2023-08-31

## 2022-06-15 RX ORDER — FUROSEMIDE 10 MG/ML
40 INJECTION INTRAMUSCULAR; INTRAVENOUS EVERY 8 HOURS
Status: DISCONTINUED | OUTPATIENT
Start: 2022-06-15 | End: 2022-06-15

## 2022-06-15 RX ORDER — ASPIRIN 81 MG/1
81 TABLET ORAL DAILY
Qty: 30 TABLET | Refills: 3 | Status: SHIPPED | OUTPATIENT
Start: 2022-06-15 | End: 2023-08-31

## 2022-06-15 RX ADMIN — METOPROLOL SUCCINATE 25 MG: 25 TABLET, EXTENDED RELEASE ORAL at 00:21

## 2022-06-15 RX ADMIN — FUROSEMIDE 40 MG: 10 INJECTION, SOLUTION INTRAMUSCULAR; INTRAVENOUS at 00:17

## 2022-06-15 RX ADMIN — FUROSEMIDE 80 MG: 10 INJECTION, SOLUTION INTRAMUSCULAR; INTRAVENOUS at 10:36

## 2022-06-15 RX ADMIN — RIVAROXABAN 10 MG: 10 TABLET, FILM COATED ORAL at 00:20

## 2022-06-15 RX ADMIN — HYDROCHLOROTHIAZIDE 12.5 MG: 12.5 TABLET ORAL at 00:21

## 2022-06-15 RX ADMIN — LISINOPRIL 20 MG: 20 TABLET ORAL at 00:21

## 2022-06-15 RX ADMIN — POTASSIUM CHLORIDE 20 MEQ: 1500 TABLET, EXTENDED RELEASE ORAL at 09:12

## 2022-06-15 RX ADMIN — POTASSIUM CHLORIDE 20 MEQ: 1500 TABLET, EXTENDED RELEASE ORAL at 00:21

## 2022-06-15 ASSESSMENT — PAIN DESCRIPTION - PAIN TYPE
TYPE: ACUTE PAIN
TYPE: ACUTE PAIN

## 2022-06-15 NOTE — ASSESSMENT & PLAN NOTE
EKG without acute ischemic change, denies chest pain follow-up serial troponin consider stress test

## 2022-06-15 NOTE — DISCHARGE SUMMARY
Discharge Summary    CHIEF COMPLAINT ON ADMISSION  Chief Complaint   Patient presents with   • Hypertension   • Shortness of Breath     Pt was diagnosed with COVID yesterday. Reports increasing SOB since this AM. Pt requiring oxygen to maintain saturations. Pt also reporting dizziness.       Reason for Admission  EMS     Admission Date  6/14/2022    CODE STATUS  Prior    HPI & HOSPITAL COURSE  As per dr strong h+pp    Debra Stockton is a 82 y.o. female who presented 6/14/2022 with hypertension and shortness of breath onset onset this morning. She endorses associated cough (4 days ago), but denies any fever. No alleviating factors attempted.  Patient reports that she took a home COVID test yesterday that was positive, also went to an outpatient clinic which confirmed that she was positive. The patient adds that she lives at home by herself.  The patient was also noted to be hypertensive on arrival, she reports she is compliant with her blood pressure and thyroid medications.  Labs are pending and she is referred to the hospitalist for admission.  At bedside she admits shortness of breath with lying flat and a nonproductive cough.  Denying chest pain palpitations nausea or vomiting.  She denies any recent changes in medications other than an unknown treatment for COVID discontinued 3 days ago.      The patient was hospitalized and noted to have some pulmonary edema thought to be related to COVID.    She was diuresed with Lasix.  Patient was hypoxic when she was admitted but we were able to wean the patient off oxygen with incentive spirometry and diuresis.    Patient had a slightly elevated troponin which was deemed to be related to cardiac strain from her pulmonary edema.      Patient started on a beta-blocker and low-dose aspirin and was stable for discharge on 6/15/2022 to follow-up with PCP for the care and management    Therefore, she is discharged in good and stable condition to home with close outpatient  follow-up.    The patient met 2-midnight criteria for an inpatient stay at the time of discharge.    Discharge Date  6/15/2022    FOLLOW UP ITEMS POST DISCHARGE      DISCHARGE DIAGNOSES  Principal Problem (Resolved):    Acute respiratory failure (HCC) POA: Yes  Active Problems:    Hypothyroidism POA: Yes      Overview: ICD-10 transition    Alcohol dependence (HCC) POA: Yes    Congestive heart failure due to hypertension (HCC) POA: Yes    COVID-19 POA: Yes  Resolved Problems:    Fluid overload POA: Yes    Troponin I above reference range POA: Yes      FOLLOW UP  No future appointments.  Herson Garcia M.D.  [address] [place] [id number]  [phone]    Go on 6/16/2022  Please go to your follow up appointment with Herson Garcia MD on June 16, 2022 at 10:30am.    Herson Garcia M.D.  [address]   [address]  [place] [address]  [phone]            MEDICATIONS ON DISCHARGE     Medication List      START taking these medications      Instructions   Aspirin Low Dose 81 MG EC tablet  Generic drug: aspirin   Hi-Nella 1 tableta por vía oral diariamente.  (Take 1 Tablet by mouth every day.)  Dose: 81 mg     metoprolol SR 25 MG Tb24  Commonly known as: TOPROL XL   Hi-Nella 1 tableta por vía oral diariamente.  (Take 1 Tablet by mouth every day.)  Dose: 25 mg        CONTINUE taking these medications      Instructions   Combigan 0.2-0.5 % Soln  Generic drug: Brimonidine Tartrate-Timolol   INSTILL ONE DROP IN BOTH EYES TWICE DAILY     dorzolamide 2 % Soln  Commonly known as: TRUSOPT   Place  in right eye 2 Times a Day.     levothyroxine 75 MCG Tabs  Commonly known as: SYNTHROID   Take 75 mcg by mouth Every morning on an empty stomach.  Dose: 75 mcg     lisinopril-hydrochlorothiazide 20-12.5 MG per tablet  Commonly known as: PRINZIDE   Take 1 Tablet by mouth 2 times a day.  Dose: 1 Tablet     Loratadine 10 MG Caps  Commonly known as: Claritin   Take 10 mg by mouth 1 time a day as needed.  Dose: 10  mg     meclizine 25 MG Tabs  Commonly known as: ANTIVERT   Take 1 Tab by mouth 3 times a day as needed.  Dose: 25 mg     Travatan Z 0.004 % Soln  Generic drug: travoprost   Place  in both eyes 2 Times a Day.        STOP taking these medications    losartan-hydrochlorothiazide 100-12.5 MG per tablet  Commonly known as: HYZAAR            Allergies  No Known Allergies    DIET  No orders of the defined types were placed in this encounter.      ACTIVITY  As tolerated.  Weight bearing as tolerated    CONSULTATIONS      PROCEDURES  Images     Show images for EC-ECHOCARDIOGRAM COMPLETE W/O CONT    EC-ECHOCARDIOGRAM COMPLETE W/O CONT  Order: [id number]   Status: Final result       Visible to patient: No (inaccessible in MyChart)       Next appt: None       0 Result Notes      Details    Reading Physician Reading Date Result Priority   No Reading Provider Prelim 6/15/2022    Robby Gregory M.D.  [phone] 6/15/2022      Narrative & Impression  Transthoracic  Echo Report        Echocardiography Laboratory     CONCLUSIONS  No prior study is available for comparison.   Normal left ventricular size, wall thickness and systolic function.  No regional wall motion abnormality noted.  No hemodynamically significant valvular heart disease noted.     SARAY WYNN  Exam Date:         06/15/2022                      10:12  Exam Location:     Inpatient  Priority:          Routine     Ordering Physician:        CAROLA VALDEZ  Referring Physician:       COURTNEY Campos  Sonographer:               Maricruz Jo RDCS     Age:    82     Gender:    F  MRN:    [id number]  :    1940  BSA:    1.54   Ht (in):    58     Wt (lb):    134  Exam Type:     Complete     Indications:     Unspecified systolic (congestive) heart failure  ICD Codes:       I50.20     CPT Codes:       74497     BP:   177    /   82     HR:   77  Technical Quality:       Fair     MEASUREMENTS  (Male / Female) Normal Values  2D ECHO  LV Diastolic Diameter PLAX         3.1 cm                4.2 - 5.9 / 3.9 - 5.3   cm  LV Systolic Diameter PLAX         1.1 cm                2.1 - 4.0 cm  IVS Diastolic Thickness           0.96 cm                 LVPW Diastolic Thickness          0.94 cm                 LVOT Diameter                     1.9 cm                  Estimated LV Ejection Fraction    70 %                    LV Ejection Fraction MOD BP       71.6 %                >= 55  %  LV Ejection Fraction MOD 4C       79.5 %                  LV Ejection Fraction MOD 2C       63 %                    IVC Diameter                      0.81 cm                    DOPPLER  AV Peak Velocity                  1.4 m/s                 AV Peak Gradient                  7.7 mmHg                AV Mean Gradient                  4.8 mmHg                LVOT Peak Velocity                0.82 m/s                AV Area Cont Eq vti               1.5 cm2                 Mitral E Point Velocity           0.46 m/s                Mitral E to A Ratio               0.53                    MV Pressure Half Time             89.6 ms                 MV Area PHT                       2.5 cm2                 MV Deceleration Time              309 ms                  TR Peak Velocity                  246 cm/s                PV Peak Velocity                  1.1 m/s                 PV Peak Gradient                  4.5 mmHg                RVOT Peak Velocity                0.92 m/s                   * Indicates values subject to auto-interpretation  LV EF:  70    %     FINDINGS  Left Ventricle  The left ventricle is normal in size and thickness.  Normal left   ventricular systolic function. The left ventricular ejection fraction   is visually estimated to be 70%. Normal regional wall motion. Normal   diastolic function.     Right Ventricle  The right ventricle is normal in size and systolic function.     Right Atrium  The right atrium is normal in size. Normal inferior vena cava size and   inspiratory collapse.     Left  Atrium  The left atrium is normal in size. Left atrial volume index is 22 mL/sq   m.     Mitral Valve  Structurally normal mitral valve without significant stenosis or   regurgitation.  Loose chorde     Aortic Valve  Structurally normal aortic valve without significant stenosis or   regurgitation.     Tricuspid Valve  Structurally normal tricuspid valve without significant stenosis. Trace   tricuspid regurgitation. Right ventricular systolic pressure is   estimated to be 27 mmHg. Right atrial pressure is estimated to be 3   mmHg.     Pulmonic Valve  Structurally normal pulmonic valve without significant stenosis or   regurgitation.     Pericardium  Normal pericardium without effusion.     Aorta  Normal aortic root for body surface area. The ascending aorta diameter   is 2.7 cm.                                Robby Gregory MD  (Electronically Signed)             LABORATORY  Lab Results   Component Value Date    SODIUM 132 (L) 06/15/2022    POTASSIUM 3.7 06/15/2022    CHLORIDE 94 (L) 06/15/2022    CO2 20 06/15/2022    GLUCOSE 140 (H) 06/15/2022    BUN 18 06/15/2022    CREATININE 0.74 06/15/2022        Lab Results   Component Value Date    WBC 6.8 06/15/2022    HEMOGLOBIN 12.2 06/15/2022    HEMATOCRIT 39.1 06/15/2022    PLATELETCT 200 06/15/2022        Total time of the discharge process exceeds 37  minutes.

## 2022-06-15 NOTE — PROGRESS NOTES
4 Eyes Skin Assessment Completed by SADAF Malin and SADAF Zavala.    Head WDL (left eye swollen)  Ears WDL  Nose WDL  Mouth WDL  Neck WDL  Breast/Chest WDL  Shoulder Blades WDL  Spine WDL  (R) Arm/Elbow/Hand WDL  (L) Arm/Elbow/Hand WDL  Abdomen WDL, old scar (healed)  Groin WDL  Scrotum/Coccyx/Buttocks WDL  (R) Leg WDL  (L) Leg WDL  (R) Heel/Foot/Toe WDL  (L) Heel/Foot/Toe WDL          Devices In Places Tele Box, Blood Pressure Cuff and Pulse Ox      Interventions In Place Pillows    Possible Skin Injury No    Pictures Uploaded Into Epic NO  Wound Consult Placed N/A  RN Wound Prevention Protocol Ordered No

## 2022-06-15 NOTE — ED NOTES
Patient calm and in room at this time. Pt remains tachypnic, but maintaining adequate saturations with supplemental oxygen. BP remains high, will contact MD for medication order. Pt to be admitted. Awaiting hospitalist examination and bed placement.

## 2022-06-15 NOTE — PROGRESS NOTES
Contacted pharmacy to reschedule patients morning medications (Hydrochlorothiazide, lisinopril, potassium) since pt received medications past midnight and patient is only supposed to receive medication twice daily.

## 2022-06-15 NOTE — ASSESSMENT & PLAN NOTE
Likely secondary to heart failure complicated by recent COVID.  Optimize blood pressure and pulmonary toilet.  Supplemental oxygen as needed

## 2022-06-15 NOTE — ASSESSMENT & PLAN NOTE
Appears euvolemic, likely secondary to high output of hypertension.  Optimize pressure and rate.  Follow-up echocardiogram

## 2022-06-15 NOTE — DISCHARGE PLANNING
[facility] TCN chart review completed. Collaborated with MINDI Lugo prior to meeting with the pt. The most current review of medical record, knowledge of pt's PLOF and social support, LACE+ score of 64 were considered.  6 clicks scores are not yet present.     Pt seen at bedside, Ipad  utilized. Introduced TCN program. Provided education regarding post acute resources. Discussed [facility] plan benefits including Meds to Beds, medical uber and [facility] transitional care services. Pt verbalizes understanding.    Pt reports living alone in a 2nd floor apt with elevator access. She mobilizes with a FWW and reports her current functional mobility to be at baseline. She has been requiring 2L O2 via NC. Choice proactively obtained for DME in case of need for home O2 and given to MINDI Lugo.  Pt agrees to [facility]- referral placed. TCN will continue to follow and collaborate with discharge planning team as additional post acute needs arise. Thank you.

## 2022-06-15 NOTE — HEART FAILURE PROGRAM
Reached out to Dr. Washington. Patient is clearly admitted primarily for SARS CoV2. However, heart failure is also being diagnosed as decompensated due to the infection. Patient has no encounters or notes from cardiology in our system.    Finally, the echocardiogram is not overtly supportive of a HF diagnosis.    Patient has a discharge order. I have asked the schedulers to get her in with her PCP within 7 days.    Thanks! Pip [phone] (Pipitone on Voalte)

## 2022-06-15 NOTE — PROGRESS NOTES
Report received from night shift RN. Assume care. Pt. AAOx4 pt is bed,  Assessment completed. VSS. Denies bodies pain,good CMS and pulses to dylan LE,  Reinforce education about use of IS- pulking 5---750. Pt was update for the care for the day. White board updated, All question answered. Pt has call light within reach,  bed is in the lowest position. Pt has no other needs at this time.   1000 Eco tech at  Bedside.  1039 Lasix 80 mg given. Reinforce education about fall Precautions and to call prior getting OOB

## 2022-06-15 NOTE — CARE PLAN
Problem: Fall Risk  Goal: Patient will remain free from falls  Outcome: Progressing   The patient is Stable - Low risk of patient condition declining or worsening    Shift Goals  Clinical Goals: wean of O2 but 1030  Patient Goals: rest  Family Goals: n/a    Progress made toward(s) clinical / shift goals:  Yes. Pt was educatated about the use of IS-pulling 500-750 mLs. She's a RA 93-94%. Awaiting for Eco result if negative Pt will  be going home. With OP f/U    Patient is not progressing towards the following goals:

## 2022-06-15 NOTE — PROGRESS NOTES
Pt had two Troponins obtained in the ER an hour apart. The first one 33, second one 73. Contacted on call provider to see if she would like another trop ordered. Received order for troponin draw.     New trop obtained 81. On call provider notified. No new orders received.

## 2022-06-15 NOTE — CARE PLAN
The patient is Watcher - Medium risk of patient condition declining or worsening    Shift Goals  Clinical Goals: Monitor BP/HR, trops, ECHO  Patient Goals: rest  Family Goals: n/a    Progress made toward(s) clinical / shift goals:    Problem: Knowledge Deficit - Standard  Goal: Patient and family/care givers will demonstrate understanding of plan of care, disease process/condition, diagnostic tests and medications  Outcome: Progressing     Problem: Care Map:  Admission Optimal Outcome for the Heart Failure Patient  Goal: Admission:  Optimal Care of the heart failure patient  Outcome: Progressing     Problem: Care Map:  Day 1 Optimal Outcome for the Heart Failure Patient  Goal: Day 1:  Optimal Care of the heart failure patient  Outcome: Progressing     Problem: Care Map:  Day 2 Optimal Outcome for the Heart Failure Patient  Goal: Day 2:  Optimal Care of the heart failure patient  Outcome: Progressing     Problem: Care Map:  Day 3 Optimal Outcome for the Heart Failure Patient  Goal: Day 3:  Optimal Care of the heart failure patient  Outcome: Progressing     Problem: Care Map:  Day Before Discharge Optimal Outcome for the Heart Failure Patient  Goal: Day Before Discharge:  Optimal Care of the heart failure patient  Outcome: Progressing     Problem: Care Map:  Day of Discharge Optimal Outcome for the Heart Failure Patient  Goal: Day of Discharge:  Optimal Care of the heart failure patient  Outcome: Progressing     Problem: Fall Risk  Goal: Patient will remain free from falls  Outcome: Progressing       Patient is not progressing towards the following goals:

## 2022-06-15 NOTE — H&P
Hospital Medicine History & Physical Note    Date of Service  6/14/2022    Primary Care Physician  Herson Garcia M.D.      Code Status  Full Code    Chief Complaint  Chief Complaint   Patient presents with   • Hypertension   • Shortness of Breath     Pt was diagnosed with COVID yesterday. Reports increasing SOB since this AM. Pt requiring oxygen to maintain saturations. Pt also reporting dizziness.       History of Presenting Illness  Debra Stockton is a 82 y.o. female who presented 6/14/2022 with hypertension and shortness of breath onset onset this morning. She endorses associated cough (4 days ago), but denies any fever. No alleviating factors attempted.  Patient reports that she took a home COVID test yesterday that was positive, also went to an outpatient clinic which confirmed that she was positive. The patient adds that she lives at home by herself.  The patient was also noted to be hypertensive on arrival, she reports she is compliant with her blood pressure and thyroid medications.  Labs are pending and she is referred to the hospitalist for admission.  At bedside she admits shortness of breath with lying flat and a nonproductive cough.  Denying chest pain palpitations nausea or vomiting.  She denies any recent changes in medications other than an unknown treatment for COVID discontinued 3 days ago.    I discussed the plan of care with patient.    Review of Systems  Review of Systems   Constitutional: Negative for fever, malaise/fatigue and weight loss.   HENT: Negative for sore throat and tinnitus.    Eyes: Negative for blurred vision and double vision.   Respiratory: Positive for cough and shortness of breath. Negative for hemoptysis, sputum production, wheezing and stridor.    Cardiovascular: Positive for orthopnea and PND. Negative for chest pain, palpitations, claudication and leg swelling.   Gastrointestinal: Negative for nausea and vomiting.   Genitourinary: Negative for dysuria and urgency.    Musculoskeletal: Negative for myalgias and neck pain.   Skin: Negative for itching and rash.   Neurological: Negative for dizziness and headaches.   Endo/Heme/Allergies: Does not bruise/bleed easily.   Psychiatric/Behavioral: Negative for depression. The patient does not have insomnia.        Past Medical History   has a past medical history of Arthritis, Dental disorder, Glaucoma, Heart burn, Hypertension, Indigestion, Other specified disorder of intestines, Unspecified cataract, Unspecified disorder of thyroid, Unspecified essential hypertension (10/30/2014), and Unspecified hypothyroidism (10/30/2014).    Surgical History   has a past surgical history that includes other abdominal surgery (over 40 yrs ago); eye foreign body removal (5/20/2014); appendectomy (1996); lid tightening (8/27/2014); lumbar laminectomy diskectomy (Left, 7/17/2015); other (2-3yrs ago); other (2004); other (1952); eye foreign body removal (Left, 1/29/2016); blepharoplasty (Left, 9/7/2016); cervical disk and fusion anterior (N/A, 6/25/2018); and corpectomy (N/A, 6/25/2018).     Family History  family history includes Cancer in her father; Diabetes in her brother, father, and mother.   Family history reviewed with patient. There is no family history that is pertinent to the chief complaint.     Social History   reports that she has never smoked. She has never used smokeless tobacco. She reports that she does not drink alcohol and does not use drugs.    Allergies  No Known Allergies    Medications  Prior to Admission Medications   Prescriptions Last Dose Informant Patient Reported? Taking?   COMBIGAN 0.2-0.5 % Solution  Patient Yes No   Sig: INSTILL ONE DROP IN BOTH EYES TWICE DAILY   Loratadine (CLARITIN) 10 MG Cap   No No   Sig: Take 10 mg by mouth 1 time a day as needed.   TRAVATAN Z 0.004 % Solution  Patient Yes No   Sig: Place  in both eyes 2 Times a Day.   dorzolamide (TRUSOPT) 2 % Solution  Patient Yes No   Sig: Place  in right eye 2  Times a Day.   levothyroxine (SYNTHROID) 75 MCG Tab   Yes No   Sig: Take 75 mcg by mouth Every morning on an empty stomach.   losartan-hydrochlorothiazide (HYZAAR) 100-12.5 MG per tablet   Yes No   Sig: Take 1 Tab by mouth every day.   meclizine (ANTIVERT) 25 MG Tab  Patient No No   Sig: Take 1 Tab by mouth 3 times a day as needed.      Facility-Administered Medications: None       Physical Exam  Temp:  [36.6 °C (97.8 °F)-36.8 °C (98.3 °F)] 36.8 °C (98.3 °F)  Pulse:  [106-118] 118  Resp:  [21-22] 22  BP: (140-197)/() 177/82  SpO2:  [95 %-98 %] 97 %  Blood Pressure : (!) 177/82   Temperature: 36.8 °C (98.3 °F)   Pulse: (!) 118   Respiration: (!) 22   Pulse Oximetry: 97 %       Physical Exam  Vitals and nursing note reviewed.   Constitutional:       General: She is not in acute distress.     Appearance: Normal appearance. She is normal weight. She is not toxic-appearing.   HENT:      Head: Normocephalic and atraumatic.      Nose: Nose normal. No congestion or rhinorrhea.      Mouth/Throat:      Mouth: Mucous membranes are moist.      Pharynx: Oropharynx is clear.   Eyes:      Extraocular Movements: Extraocular movements intact.      Conjunctiva/sclera: Conjunctivae normal.      Pupils: Pupils are equal, round, and reactive to light.   Neck:      Vascular: No carotid bruit.   Cardiovascular:      Rate and Rhythm: Normal rate and regular rhythm.      Pulses: Normal pulses.      Heart sounds: No murmur heard.    Gallop present.   Pulmonary:      Effort: No respiratory distress.      Breath sounds: Rales present. No wheezing.   Abdominal:      General: Abdomen is flat. Bowel sounds are normal. There is no distension.      Palpations: Abdomen is soft. There is no mass.      Tenderness: There is no abdominal tenderness.      Hernia: No hernia is present.   Musculoskeletal:         General: No swelling, tenderness, deformity or signs of injury.      Cervical back: Normal range of motion and neck supple. No muscular  tenderness.      Right lower leg: No edema.      Left lower leg: No edema.   Lymphadenopathy:      Cervical: No cervical adenopathy.   Skin:     Capillary Refill: Capillary refill takes less than 2 seconds.      Coloration: Skin is not jaundiced or pale.      Findings: No bruising.   Neurological:      General: No focal deficit present.      Mental Status: She is alert and oriented to person, place, and time. Mental status is at baseline.      Cranial Nerves: No cranial nerve deficit.      Motor: No weakness.      Coordination: Coordination normal.   Psychiatric:         Mood and Affect: Mood normal.         Thought Content: Thought content normal.         Judgment: Judgment normal.         Laboratory:  Recent Labs     06/14/22  1717   WBC 9.0   RBC 4.66   HEMOGLOBIN 12.7   HEMATOCRIT 39.6   MCV 85.0   MCH 27.3   MCHC 32.1*   RDW 44.0   PLATELETCT 222   MPV 10.9     Recent Labs     06/14/22  1717   SODIUM 131*   POTASSIUM 3.7   CHLORIDE 94*   CO2 20   GLUCOSE 114*   BUN 17   CREATININE 0.96   CALCIUM 9.3     Recent Labs     06/14/22  1717   ALTSGPT 18   ASTSGOT 46*   ALKPHOSPHAT 113*   TBILIRUBIN 0.3   GLUCOSE 114*         Recent Labs     06/14/22  1848   NTPROBNP 1016*         Recent Labs     06/14/22  1717 06/14/22  1848   TROPONINT 33* 73*       Imaging:  DX-CHEST-PORTABLE (1 VIEW)   Final Result      No acute cardiopulmonary abnormality.         EC-ECHOCARDIOGRAM COMPLETE W/O CONT    (Results Pending)       X-Ray:  I have personally reviewed the images and compared with prior images.    Assessment/Plan:  Justification for Admission Status  I anticipate this patient will require at least two midnights for appropriate medical management, necessitating inpatient admission because Acute congestive heart failure complicated by uncontrolled blood pressure and alcohol use.    * Acute respiratory failure (HCC)- (present on admission)  Assessment & Plan  Likely secondary to heart failure complicated by recent COVID.   Optimize blood pressure and pulmonary toilet.  Supplemental oxygen as needed    Alcohol dependence (HCC)  Assessment & Plan  Education, monitor for alcohol withdrawal    Troponin I above reference range  Assessment & Plan  EKG without acute ischemic change, denies chest pain follow-up serial troponin consider stress test    Acute congestive heart failure (HCC)  Assessment & Plan  Appears euvolemic, likely secondary to high output of hypertension.  Optimize pressure and rate.  Follow-up echocardiogram    Hypothyroidism- (present on admission)  Assessment & Plan  Unclear control.  Follow-up TSH      VTE prophylaxis: SCDs/TEDs

## 2022-06-15 NOTE — DISCHARGE INSTRUCTIONS
Discharge Instructions    Discharged to home by car with relative. Discharged via wheelchair, hospital escort: Yes.  Special equipment needed: Not Applicable    Be sure to schedule a follow-up appointment with your primary care doctor or any specialists as instructed.     Discharge Plan:   Diet Plan: Discussed  Activity Level: Discussed  Confirmed Follow up Appointment: Patient to Call and Schedule Appointment  Confirmed Symptoms Management: Discussed  Medication Reconciliation Updated: Yes    I understand that a diet low in cholesterol, fat, and sodium is recommended for good health. Unless I have been given specific instructions below for another diet, I accept this instruction as my diet prescription.   Other diet: Cardiac diet    Special Instructions: None    Is patient discharged on Warfarin / Coumadin?   No     Depression / Suicide Risk    As you are discharged from this [facility] facility, it is important to learn how to keep safe from harming yourself.    Recognize the warning signs:  Abrupt changes in personality, positive or negative- including increase in energy   Giving away possessions  Change in eating patterns- significant weight changes-  positive or negative  Change in sleeping patterns- unable to sleep or sleeping all the time   Unwillingness or inability to communicate  Depression  Unusual sadness, discouragement and loneliness  Talk of wanting to die  Neglect of personal appearance   Rebelliousness- reckless behavior  Withdrawal from people/activities they love  Confusion- inability to concentrate     If you or a loved one observes any of these behaviors or has concerns about self-harm, here's what you can do:  Talk about it- your feelings and reasons for harming yourself  Remove any means that you might use to hurt yourself (examples: pills, rope, extension cords, firearm)  Get professional help from the community (Mental Health, Substance Abuse, psychological counseling)  Do not be  alone:Call your Safe Contact- someone whom you trust who will be there for you.  Call your local CRISIS HOTLINE 427-6651 or [phone]  Call your local Children's Mobile Crisis Response Team [place] [phone] 076-2794 or www.Iron.io  Call the toll free National Suicide Prevention Hotlines   National Suicide Prevention Lifeline 828-523-NUGP (0376)  National Ventrix Line Network 800-SUICIDE (992-6931)    Insuficiencia respiratoria aguda en los adultos  (Acute Respiratory Failure, Adult)  La insuficiencia respiratoria aguda ocurre cuando no pasa suficiente oxígeno de los pulmones al cuerpo. Cuando esto sucede, los pulmones tienen dificultad para eliminar el dióxido de carbono de la marcos. A medida que el dióxido de carbono se acumula, el nivel de oxígeno en la marcos disminuye considerablemente.  La insuficiencia respiratoria aguda es elvia emergencia médica. Puede aparecer rápidamente, yoselin es temporaria si se trata pronto. La capacidad pulmonar, o cuánto aire pueden contener los pulmones, puede mejorar con el tiempo, el ejercicio y el tratamiento.  CAUSAS  Hay muchas causas posibles de la insuficiencia respiratoria aguda, entre ellas:  Lesión pulmonar.  Lesión torácica o daño en las costillas o los tejidos cercanos a los pulmones.  Afecciones pulmonares que afectan el flujo de aire y marcos hacia y desde los pulmones, angela la neumonía, el síndrome de dificultad respiratoria aguda y la fibrosis quística.  Afecciones médicas, angela ictus y lesiones en la médula garcia, que afectan los músculos y los nervios que controlan la respiración.  Infección en la marcos (sepsis).  Inflamación del páncreas (pancreatitis).  Un coágulo de marcos en los pulmones (embolia pulmonar).  Elvia transfusión de marcos de gran volumen.  Quemaduras.  Ahogamiento inminente.  Convulsiones.  Inhalación de humo.  Reacciones a medicamentos.  Sobredosis de alcohol o drogas.  FACTORES DE RIESGO  Es más probable que esta afección se manifieste  en las personas que tienen:  Elvia obstrucción en las vías respiratorias.  Asma.  Elvia afección o elvia enfermedad que daña o debilita los músculos, los nervios, los huesos o los tejidos que participan en la respiración.  Elvia infección grave.  Un problema de dillon que bloquea el reflejo inconsciente que está involucrado en la respiración, angela el hipotiroidismo o la apnea del sueño.  Un traumatismo o lesión pulmonar.  SÍNTOMAS  La dificultad para respirar es el síntoma principal de la insuficiencia respiratoria aguda. Otros síntomas son:  Respiración rápida.  Ansiedad o inquietud.  Piel, labios o yemas de los dedos de color azulado (cianosis).  Frecuencia cardíaca acelerada.  Ritmo cardíaco anormal (arritmia).  Confusión o cambios en la conducta.  Cansancio o pérdida de la energía.  Somnolencia o pérdida de la consciencia.  DIAGNÓSTICO  El médico puede diagnosticar insuficiencia respiratoria aguda en función de gerardo historia clínica y de un examen físico. Ana el examen, el médico escuchará el corazón y tratará de detectar sonidos crepitantes o sibilancias en los pulmones. También pueden hacerle estudios para confirmar el diagnóstico y determinar qué está produciendo la insuficiencia respiratoria. Estos estudios pueden incluir los siguientes:  Medición de la cantidad de oxígeno en la marcos (oximetría de pulso). La medición se obtiene con un dispositivo pequeño que se coloca en el dedo, el lóbulo o un dedo del pie.  Otros análisis de marcos para medir los gases en la marcos y para buscar signos de infección.  Ligia de muestras del líquido cefalorraquídeo o del líquido traqueal para detectar infecciones.  Radiografía de tórax para buscar líquidos en espacios que deberían estar llenos de aire.  Electrocardiograma (ECG) para observar la actividad eléctrica del corazón.  TRATAMIENTO  El tratamiento para esta afección suele realizarse en la unidad de cuidados intensivos (UCI) de un hospital. El tratamiento depende de la  causa. Se pueden necesitar yaw o más tratamientos para que los síntomas mejoren. El tratamiento puede incluir lo siguiente:  Oxígeno complementario. Para administrar oxígeno extra, se utiliza un tubo en la nariz, elvia mascarilla o elvia zoraida de oxígeno.  Un dispositivo angela elvia máquina de presión positiva continua (CPAP) o de presión positiva de dos niveles (BiPAP o BPAP) en las vías respiratorias. Diana tratamiento utiliza elvia presión de aire leve para mantener las vías respiratorias abiertas. Se le colocará elvia mascarilla u otro dispositivo en la nariz o la boca. Un tuvo conectado a un motor administrará oxígeno a través de la mascarilla.  Respirador. Diana tratamiento ayuda a transportar aire desde y hacia los pulmones. [place] se puede realizar con elvia bolsa y elvia mascarilla o elvia máquina. Para diana tratamiento, se coloca un tubo en la tráquea para que el aire y el oxígeno puedan llegar a los pulmones.  Oxigenación por membrana extracorpórea (OMEC). Diana tratamiento asume la función del corazón y los pulmones, de forma temporaria, para suministrar oxígeno y eliminar el dióxido de carbono. A través de la OMEC, los pulmones tienen la posibilidad de recuperarse. Diana tratamiento se puede utilizar cuando el ventilador no es efectivo.  Traqueostomía. En diana procedimiento se hace un orificio en el martha en el que se inserta un tubo para que pueda respirar.  Recibir líquidos y medicamentos.  Mecer la cama para ayudar a respirar.  INSTRUCCIONES PARA EL CUIDADO EN EL HOGAR  Dubuque los medicamentos de venta michelet y los recetados solamente angela se lo haya indicado el médico.  Retome skip actividades normales angela se lo haya indicado el médico. Pregúntele al médico qué actividades son seguras para usted.  Concurra a todas las visitas de control angela se lo haya indicado el médico. [place] es importante.  PREVENCIÓN  Tratar infecciones y afecciones médicas que pueden producir insuficiencia respiratoria aguda puede contribuir a evitar  el desarrollo de esta afección.  SOLICITE ATENCIÓN MÉDICA SI:  Tiene fiebre.  Los síntomas no mejoran o empeoran.  SOLICITE ATENCIÓN MÉDICA DE INMEDIATO SI:  Presenta dificultades respiratorias súbitas.  Pierde la conciencia.  Tiene cianosis o se torna de un color azulado.  Se le acelera la frecuencia cardíaca.  Se siente confundido.  Estos síntomas pueden representar un problema grave que constituye elvia emergencia. No espere hasta que los síntomas desaparezcan. Solicite atención médica de inmediato. Comuníquese con el servicio de emergencias de gerardo localidad (911 en los [place]). No conduzca por skip propios medios hasta el hospital.  Esta información no tiene angela fin reemplazar el consejo del médico. Asegúrese de hacerle al médico cualquier pregunta que tenga.  Document Released: 12/23/2014 Document Revised: 11/07/2017 Document Reviewed: 07/05/2017  Elsevier Patient Education © 2020 [employer]    COVID-19  COVID-19  La COVID-19 es elvia infección respiratoria causada por un virus llamado coronavirus tipo 2 causante del síndrome respiratorio joe grave (SARS-CoV-2). La enfermedad también se conoce angela enfermedad por coronavirus o nuevo coronavirus. En algunas personas, el virus puede no ocasionar síntomas. En otras, puede producir elvia infección grave. La infección puede empeorar rápidamente y causar complicaciones, angela:  Neumonía o infección en los pulmones.  Síndrome de dificultad respiratoria aguda o SDRA. Se trata de la acumulación de líquido en los pulmones.  Insuficiencia respiratoria aguda. Se trata de elvia afección en la que no pasa suficiente oxígeno de los pulmones al cuerpo.  Sepsis o choque séptico. Se trata de elvia reacción grave del cuerpo ante elvia infección.  Problemas de coagulación.  Infecciones secundarias debido a bacterias u hongos.  El virus que causa la COVID-19 es contagioso. [place] significa que puede transmitirse de elvia persona a otra a través de las gotitas de saliva de la tos y de  los estornudos (secreciones respiratorias).  ¿Cuáles son las causas?  Esta enfermedad es causada por un virus. Usted puede contagiarse con diana virus:  Al aspirar las gotitas que elvia persona infectada elimina al toser o estornudar.  Al tocar algo, angela elvia wolfe o el picaportes de elvia arnulfo, que estuvo expuesto al virus (contaminado) y luego tocarse la boca, nariz o los ojos.  ¿Qué incrementa el riesgo?  Riesgo de infección  Es más probable que se infecte con diana virus si:  Vive o viaja a elvia marla donde hay un brote de COVID-19.  Entra en contacto con elvia persona enferma que recientemente viajó a elvia marla con un brote de COVID-19.  Cuida o vive con elvia persona infectada con COVID-19.  Riesgo de enfermedad grave  Es más probable que se enferme gravemente por el virus si:  Tiene 65 años o más.  Tiene elvia enfermedad crónica que disminuye la capacidad del cuerpo para combatir las infecciones (immunocomprometido).  Vive en un hogar de ancianos o centro de atención a jay plazo.  Tiene elvia enfermedad prolongada (crónica), angela las siguientes:  Enfermedad pulmonar crónica, que incluye la enfermedad pulmonar obstructiva crónica o asma.  Enfermedad cardíaca.  Diabetes.  Enfermedad renal crónica.  Enfermedad hepática.  Es tj.  ¿Cuáles son los signos o síntomas?  Los síntomas de esta afección pueden ser de leves a graves. Los síntomas pueden aparecer en el término de 2 a 14 días después de kiya estado expuesto al virus. Incluyen los siguientes:  Fiebre.  Tos.  Dificultad para respirar.  Escalofríos.  Prudence musculares.  Dolor de garganta.  Pérdida del gusto o el olfato.  Algunas personas también pueden tener problemas estomacales, angela náuseas, vómitos o diarrea.  Es posible que otras personas no tengan síntomas de COVID-19.  ¿Cómo se diagnostica?  Esta afección se puede diagnosticar en función de lo siguiente:  [place] signos y síntomas, especialmente si:  Vive en elvia marla donde hay un brote de COVID-19.  Viajó  recientemente a elvia marla donde el virus es frecuente.  Cuida o vive con elvia persona a quien se le diagnosticó COVID-19.  Un examen físico.  Análisis de laboratorio que pueden incluir:  Un hisopado nasal para kunal elvia muestra de líquido de la nariz.  Un hisopado de garganta para kunal elvia muestra de líquido de la garganta.  Elvia muestra de mucosidad de los pulmones (esputo).  Análisis de marcos.  Los estudios de diagnóstico por imágenes pueden incluir radiografías, exploración por tomografía computarizada (TC) o ecografía.  ¿Cómo se trata?  En diana momento, no hay ningún medicamento para tratar la COVID-19. Los medicamentos para tratar otras enfermedades se usan a modo de ensayo para comprobar si son eficaces contra la COVID-19.  El médico le informará sobre las maneras de tratar los síntomas. En la mayoría de las personas, la infección es leve y puede controlarse en el hogar con reposo, líquidos y medicamentos de venta michelet.  El tratamiento para elvia infección grave suele realizarse en la unidad de cuidados intensivos (UCI) de un hospital. Puede incluir yaw o más de los siguientes. Estos tratamientos se administran hasta que los síntomas mejoran.  Recibir líquidos y medicamentos a través de elvia vía intravenosa.  Oxígeno complementario. Para administrar oxígeno extra, se utiliza un tubo en la nariz, elvia mascarilla o elvia zoraida de oxígeno.  Colocarlo para que se recueste boca abajo (decúbito prono). Porcupine facilita el ingreso de oxígeno a los pulmones.  Uso continuo de elvia máquina de presión positiva de las vías aéreas (CPAP) o de presión positiva de las vías aéreas de dos niveles (BPAP). Diana tratamiento utiliza elvia presión de aire leve para mantener las vías respiratorias abiertas. Un tubo conectado a un motor administra oxígeno al cuerpo.  Respirador. Diana tratamiento mueve el aire dentro y fuera de los pulmones mediante el uso de un tubo que se coloca en la tráquea.  Traqueostomía. En diana procedimiento se hace un  orificio en el martha para insertar un tubo de respiración.  Oxigenación por membrana extracorpórea (OMEC). En diana procedimiento, los pulmones tienen la posibilidad de recuperarse al asumir las funciones del corazón y los pulmones. Suministra oxígeno al cuerpo y elimina el dióxido de carbono.  Siga estas instrucciones en gerardo casa:  Estilo de chloe  Si está enfermo, quédese en gerardo casa, excepto para obtener atención médica. El médico le indicará cuánto tiempo debe quedarse en casa. Llame al médico antes de buscar atención médica.  Moshe reposo en gerardo casa angela se lo haya indicado el médico.  No consuma ningún producto que contenga nicotina o tabaco, angela cigarrillos, cigarrillos electrónicos y tabaco de mascar. Si necesita ayuda para dejar de fumar, consulte al médico.  Retome skip actividades normales angela se lo haya indicado el médico. Pregúntele al médico qué actividades son seguras para usted.  Instrucciones generales  Use los medicamentos de venta michelet y los recetados solamente angela se lo haya indicado el médico.  [place] suficiente líquido angela para mantener la orina de color amarillo pálido.  Concurra a todas las visitas de seguimiento angela se lo haya indicado el médico. [place] es importante.  ¿Cómo se mariza?    No hay ninguna vacuna que ayude a prevenir la infección por la COVID-19. Sin embargo, hay medidas que puede kunal para protegerse y proteger a otras personas de diana virus.  Para protegerse:   No viaje a zonas donde la COVID-19 sea un riesgo. Las zonas donde se informa la presencia de la COVID-19 cambian con frecuencia. Para identificar las zonas de alto riesgo y las restricciones de viaje, consulte el sitio web de viajes de los Centers for Disease Control and Prevention (CDC) (Centros para el Control y la Prevención de Enfermedades): wwwnc.cdc.gov/travel/notices  Si vive o debe viajar a elvia marla donde COVID-19 es un riesgo, tome precauciones para evitar infecciones.  Aléjese de las personas enfermas.  Lávese  las edelmira frecuentemente con agua y jabón rashaad 20 segundos. Use desinfectante para edelmira con alcohol si no dispone de agua y jabón.  Evite tocarse la boca, la erickson, los ojos o la nariz.  Evite salir de gerardo casa, siga las indicaciones de gerardo estado y de las autoridades sanitarias locales.  Si debe salir de gerardo casa, use un barbijo de jordan o elvia mascarilla facial.  Desinfecte los objetos y las superficies que se tocan con frecuencia todos los días. Pueden incluir:  Encimeras y mesas.  Picaportes e interruptores de neelam.  Lavabos, fregaderos y grifos.  Aparatos electrónicos tales angela teléfonos, controles remotos, teclados, computadoras y tabletas.  Cómo proteger a los demás:  Si tiene síntomas de la COVID-19, tome medidas para evitar que el virus se propague a otras personas.  Si vanessa que tiene elvia infección por la COVID-19, comuníquese de inmediato con gerardo médico. Informe al equipo de atención médica que vanessa que puede tener elvia infección por la COVID-19.  Quédese en gerardo casa. Salga de gerardo casa solo para buscar atención médica. No utilice el transporte público.  No viaje mientras esté enfermo.  Lávese las edelmira frecuentemente con agua y jabón rashaad 20 segundos. Usar desinfectante para edelmira con alcohol si no dispone de agua y jabón.  Manténgase alejado de quienes vivan con usted. Permita que los miembros de la amy sanos cuiden a los niños y las mascotas, si es posible. Si tiene que cuidar a los niños o las mascotas, lávese las edelmira con frecuencia y use un barbijo. Si es posible, permanezca en gerardo habitación, separado de los demás. Utilice un baño diferente.  Asegúrese de que todas las personas que viven en gerardo casa se laven zeeshan las edelmira y con frecuencia.  Tosa o estornude en un pañuelo de papel o sobre gerardo manga o codo. No tosa o estornude al aire ni se cubra la boca o la nariz con la mano.  Use un barbijo de jordan o elvia mascarilla facial.  Dónde buscar más información  Centers for Disease Control and Prevention  (Centros para el Control y la Prevención de Enfermedades): www.cdc.gov/coronavirus/2019-ncov/index.html  World Health Organization (Organización Mundial de la [place]): www.who.int/health-topics/coronavirus  Comuníquese con un médico si:  Vive o ha viajado a elvia marla donde la COVID-19 es un riesgo y tiene síntomas de infección.  Ha tenido contacto con alguien que tiene COVID-19 y usted tiene síntomas de infección.  Solicite ayuda de inmediato si:  Tiene dificultad para respirar.  Siente dolor u opresión en el pecho.  Experimenta confusión.  Tiene las uñas de los dedos y los labios de color azulado.  Tiene dificultad para despertarse.  Los síntomas empeoran.  Estos síntomas pueden representar un problema grave que constituye elvia emergencia. No espere a edward si los síntomas desaparecen. Solicite atención médica de inmediato. Comuníquese con el servicio de emergencias de gerardo localidad (911 en los [place]). No conduzca por skip propios medios hasta el hospital. Informe al personal médico de emergencias si vanessa que tiene COVID-19.  Resumen  La COVID-19 es elvia infección respiratoria causada por un virus. También se conoce angela enfermedad por coronavirus o nuevo coronavirus. Puede causar infecciones graves, angela neumonía, síndrome de dificultad respiratoria aguda, insuficiencia respiratoria aguda o sepsis.  El virus que causa la COVID-19 es contagioso. [place] significa que puede transmitirse de elvia persona a otra a través de las gotitas de saliva de la tos y de los estornudos.  Es más probable que desarrolle elvia enfermedad grave si tiene 65 años o más, tiene un sistema inmunitario débil, vive en un hogar de ancianos o tiene enfermedad crónica.  No hay ningún medicamento para tratar la COVID-19. El médico le informará sobre las maneras de tratar los síntomas.  [place] medidas para protegerse y proteger a los demás contra las infecciones. Lávese las edelmira con frecuencia y desinfecte los objetos y las superficies que se tocan  con frecuencia todos los días. Manténgase alejado de las personas que estén enfermas y use un barbijo si está enfermo.  Esta información no tiene angela fin reemplazar el consejo del médico. Asegúrese de hacerle al médico cualquier pregunta que tenga.  Document Released: 02/15/2020 Document Revised: 05/19/2020 Document Reviewed: 02/15/2020  Elsevier Patient Education © 2020 [employer] Inc.

## 2022-06-16 PROBLEM — U07.1 COVID-19: Status: ACTIVE | Noted: 2022-06-16

## 2022-06-17 NOTE — DOCUMENTATION QUERY
[facility]                                                                       Query Response Note      PATIENT:               SARAY WYNN  ACCT #:                  [id number]  MRN:                     [id number]  :                      [id number]  ADMIT DATE:       2022 4:23 PM  DISCH DATE:        6/15/2022 6:45 PM  RESPONDING  PROVIDER #:        [id number]           QUERY TEXT:    Acute Congestive Heart Failure is documented in the History and Physical. The follow up echocardiogram reports normal left ventricular size, wall thickness, and systolic function as well as normal diastolic function. The Discharge Summary notes pulmonary edema was thought to be related to COVID 19 infection. Please clarify the status of acute congestive heart failure.     NOTE:  If an appropriate response is not listed below, please respond with a new note.    The patient's Clinical Indicators include:  Per H&P   -Acute respiratory failure  --Likely secondary to heart failure complicated by recent COVI  -Acute congestive heart failure   --Appears euvolemic, likely secondary to high output of hypertension    Per D/C Summary 6/15  -noted to have some pulmonary edema thought to be related to COVID  -was diuresed with Lasix  -Active Problems:   --Congestive heart failure due to hypertension (HCC) POA: Yes     Results Review:   NT-proBNP 1016, 4092  Troponin T 33, 73 ,81, 67, 58  CXR: No acute cardiopulmonary abnormality.  Echocardiogram:   No prior study is available for comparison.   Normal left ventricular size, wall thickness and systolic function.  No regional wall motion abnormality noted.  No hemodynamically significant valvular heart disease noted    Treatment:   NT-proBNP, CXR, echocardiogram, IV Lasix, lisinopril, hydrochlorothiazide, & metoprolol    Risk Factors:   COVID 19 infection, HTN, fluid overload, elevated troponin, & hx of alcohol  dependence     Thank You,  Nahomi Wooten RN BSN  Clinical   Connect via Painting With A Twist  Options provided:   -- Congestive Heart Failure has been ruled out   -- Acute Congestive Heart Failure is present, (Please provide additional relevant clinical indicators and specify the type (systolic, diastolic, or combined)   -- Other explanation of clinical findings, (Please specify other explanation of clinical findings)   -- Unable to determine      Query created by: Nahomi Wooten on 6/17/2022 10:55 AM    RESPONSE TEXT:    Congestive Heart Failure has been ruled out          Electronically signed by:  NGA MARKHAM MD 6/17/2022 11:33 AM

## 2022-06-19 LAB
BACTERIA BLD CULT: NORMAL
BACTERIA BLD CULT: NORMAL
SIGNIFICANT IND 70042: NORMAL
SIGNIFICANT IND 70042: NORMAL
SITE SITE: NORMAL
SITE SITE: NORMAL
SOURCE SOURCE: NORMAL
SOURCE SOURCE: NORMAL

## 2022-06-27 ENCOUNTER — HOSPITAL ENCOUNTER (OUTPATIENT)
Facility: MEDICAL CENTER | Age: 82
End: 2022-06-27
Attending: FAMILY MEDICINE
Payer: MEDICARE

## 2022-06-27 PROCEDURE — 87086 URINE CULTURE/COLONY COUNT: CPT

## 2022-06-28 NOTE — DOCUMENTATION QUERY
[facility]                                                                       Query Response Note      PATIENT:               SARAY WYNN  ACCT #:                  [id number]  MRN:                     [id number]  :                      1940  ADMIT DATE:       2022 4:23 PM  DISCH DATE:        6/15/2022 6:45 PM  RESPONDING  PROVIDER #:        [id number]           QUERY TEXT:    Acute respiratory failure is documented as an admit diagnosis. The History and Physical reports the patient complains of shortness of breath with lying flat. The exam finding indicate no respiratory distress. Per vital sign flowsheets her respiratory rate was 18 to 22 with SPO2 95-98% on a maximum of 2L oxygen via nasal cannula. The Discharge Summary states she was ?hypoxic when she was admitted but we were able to wean the patient off oxygen with incentive spirometry and diuresis.? After further study, is acute respiratory failure an accurate diagnosis for this admission?    Note: If an appropriate response is not listed below, please respond with a new note.    The patient's Clinical Indicators include:  Per H&P   -admits shortness of breath with lying flat and a nonproductive cough  -Effort: No respiratory distress.   -Breath sounds: Rales present. No wheezing.   -Acute respiratory failure (HCC)- (present on admission)  -Likely secondary to heart failure complicated by recent COVID.    Per Previous Documentation Query:   -Congestive Heart Failure has been ruled out     Per D/C Summary 6/15  - hospitalized and noted to have some pulmonary edema thought to be related to COVID.   -hypoxic when she was admitted but we were able to wean the patient off oxygen with incentive spirometry and diuresis    CXR : No acute cardiopulmonary abnormality.    Treatment:   Supplemental O2 at 2L via N/C weaned to room air, CXR, IV Lasix, IV Decadron 10mg x 1dose, &  incentive spirometer    Risk Factors:   COVID 19 infection, fluid overload, & hx HTN & alcohol dependence    Thank You,  Nahomi Wooten RN BSN  Clinical   Connect via [employer]  Options provided:   -- No, acute respiratory failure is not a valid diagnosis during this admission   -- Yes, acute respiratory failure is present active during this admission, please provide additional clinical indicators   -- Other explanation of clinical findings, (Please specify other explanation of clinical findings)   -- Unable to determine      Query created by: Nahomi Wooten on 6/21/2022 12:00 PM    RESPONSE TEXT:    No, acute respiratory failure is not a valid diagnosis during this admission          Electronically signed by:  NGA MARKHAM MD 6/28/2022 6:50 AM

## 2022-06-30 LAB
BACTERIA UR CULT: NORMAL
SIGNIFICANT IND 70042: NORMAL
SITE SITE: NORMAL
SOURCE SOURCE: NORMAL

## 2022-07-02 ENCOUNTER — HOSPITAL ENCOUNTER (EMERGENCY)
Facility: MEDICAL CENTER | Age: 82
End: 2022-07-03
Attending: EMERGENCY MEDICINE
Payer: MEDICARE

## 2022-07-02 DIAGNOSIS — I10 HYPERTENSION, UNSPECIFIED TYPE: ICD-10-CM

## 2022-07-02 DIAGNOSIS — R51.9 NONINTRACTABLE HEADACHE, UNSPECIFIED CHRONICITY PATTERN, UNSPECIFIED HEADACHE TYPE: ICD-10-CM

## 2022-07-02 PROCEDURE — 99283 EMERGENCY DEPT VISIT LOW MDM: CPT

## 2022-07-03 ENCOUNTER — APPOINTMENT (OUTPATIENT)
Dept: RADIOLOGY | Facility: MEDICAL CENTER | Age: 82
End: 2022-07-03
Attending: EMERGENCY MEDICINE
Payer: MEDICARE

## 2022-07-03 VITALS
HEIGHT: 58 IN | RESPIRATION RATE: 16 BRPM | WEIGHT: 134.92 LBS | SYSTOLIC BLOOD PRESSURE: 161 MMHG | HEART RATE: 68 BPM | TEMPERATURE: 97.6 F | BODY MASS INDEX: 28.32 KG/M2 | OXYGEN SATURATION: 95 % | DIASTOLIC BLOOD PRESSURE: 71 MMHG

## 2022-07-03 LAB
ALBUMIN SERPL BCP-MCNC: 3.6 G/DL (ref 3.2–4.9)
ALBUMIN/GLOB SERPL: 1 G/DL
ALP SERPL-CCNC: 91 U/L (ref 30–99)
ALT SERPL-CCNC: 8 U/L (ref 2–50)
ANION GAP SERPL CALC-SCNC: 13 MMOL/L (ref 7–16)
AST SERPL-CCNC: 17 U/L (ref 12–45)
BASOPHILS # BLD AUTO: 0.9 % (ref 0–1.8)
BASOPHILS # BLD: 0.06 K/UL (ref 0–0.12)
BILIRUB SERPL-MCNC: 0.3 MG/DL (ref 0.1–1.5)
BUN SERPL-MCNC: 20 MG/DL (ref 8–22)
CALCIUM SERPL-MCNC: 8.9 MG/DL (ref 8.5–10.5)
CHLORIDE SERPL-SCNC: 100 MMOL/L (ref 96–112)
CO2 SERPL-SCNC: 22 MMOL/L (ref 20–33)
CREAT SERPL-MCNC: 1.02 MG/DL (ref 0.5–1.4)
EKG IMPRESSION: NORMAL
EOSINOPHIL # BLD AUTO: 0.41 K/UL (ref 0–0.51)
EOSINOPHIL NFR BLD: 6.2 % (ref 0–6.9)
ERYTHROCYTE [DISTWIDTH] IN BLOOD BY AUTOMATED COUNT: 44.4 FL (ref 35.9–50)
GFR SERPLBLD CREATININE-BSD FMLA CKD-EPI: 56 ML/MIN/1.73 M 2
GLOBULIN SER CALC-MCNC: 3.5 G/DL (ref 1.9–3.5)
GLUCOSE SERPL-MCNC: 109 MG/DL (ref 65–99)
HCT VFR BLD AUTO: 34.7 % (ref 37–47)
HGB BLD-MCNC: 11.2 G/DL (ref 12–16)
IMM GRANULOCYTES # BLD AUTO: 0.01 K/UL (ref 0–0.11)
IMM GRANULOCYTES NFR BLD AUTO: 0.2 % (ref 0–0.9)
LYMPHOCYTES # BLD AUTO: 2.22 K/UL (ref 1–4.8)
LYMPHOCYTES NFR BLD: 33.4 % (ref 22–41)
MAGNESIUM SERPL-MCNC: 1.6 MG/DL (ref 1.5–2.5)
MCH RBC QN AUTO: 27.7 PG (ref 27–33)
MCHC RBC AUTO-ENTMCNC: 32.3 G/DL (ref 33.6–35)
MCV RBC AUTO: 85.7 FL (ref 81.4–97.8)
MONOCYTES # BLD AUTO: 0.54 K/UL (ref 0–0.85)
MONOCYTES NFR BLD AUTO: 8.1 % (ref 0–13.4)
NEUTROPHILS # BLD AUTO: 3.4 K/UL (ref 2–7.15)
NEUTROPHILS NFR BLD: 51.2 % (ref 44–72)
NRBC # BLD AUTO: 0 K/UL
NRBC BLD-RTO: 0 /100 WBC
PLATELET # BLD AUTO: 268 K/UL (ref 164–446)
PMV BLD AUTO: 9.9 FL (ref 9–12.9)
POTASSIUM SERPL-SCNC: 3.4 MMOL/L (ref 3.6–5.5)
PROT SERPL-MCNC: 7.1 G/DL (ref 6–8.2)
RBC # BLD AUTO: 4.05 M/UL (ref 4.2–5.4)
SODIUM SERPL-SCNC: 135 MMOL/L (ref 135–145)
TROPONIN T SERPL-MCNC: 11 NG/L (ref 6–19)
WBC # BLD AUTO: 6.6 K/UL (ref 4.8–10.8)

## 2022-07-03 PROCEDURE — 80053 COMPREHEN METABOLIC PANEL: CPT

## 2022-07-03 PROCEDURE — 36415 COLL VENOUS BLD VENIPUNCTURE: CPT

## 2022-07-03 PROCEDURE — 83735 ASSAY OF MAGNESIUM: CPT

## 2022-07-03 PROCEDURE — 71045 X-RAY EXAM CHEST 1 VIEW: CPT

## 2022-07-03 PROCEDURE — 84484 ASSAY OF TROPONIN QUANT: CPT

## 2022-07-03 PROCEDURE — 85025 COMPLETE CBC W/AUTO DIFF WBC: CPT

## 2022-07-03 PROCEDURE — 93005 ELECTROCARDIOGRAM TRACING: CPT | Performed by: EMERGENCY MEDICINE

## 2022-07-03 ASSESSMENT — PAIN SCALES - WONG BAKER: WONGBAKER_NUMERICALRESPONSE: HURTS JUST A LITTLE BIT

## 2022-07-03 ASSESSMENT — FIBROSIS 4 INDEX: FIB4 SCORE: 4.31

## 2022-07-03 NOTE — ED NOTES
Pt given discharge teaching via . This RN escorted her to the lobby and to her cab with her walker.

## 2022-07-03 NOTE — ED PROVIDER NOTES
ED Provider Note    CHIEF COMPLAINT  Chief Complaint   Patient presents with   • Hypertension     PT reported this evening while watching TV she started having a HA and feeling dizzy,  PT called 911 and was found to have a BP of 180/110       HPI  Debra Stockton is a 82 y.o. female who presents for evaluation of high blood pressure.  Patient notes she was watching TV this evening and started feeling lightheaded and developed a mild, gradual onset headache.  She notes she has been taking her blood pressure medications as recommended and even has a as needed blood pressure medication which she took tonight.  She has had no chest pain, shortness of breath, fevers, chills, nausea, vomiting, sweating, or diarrhea.    REVIEW OF SYSTEMS  Constitutional: No fevers or chills  Skin: No rashes  HEENT: No sore throat, runny nose, sores, trouble swallowing, trouble speaking.  Neck: No neck pain  Chest: No pain   Pulm: No shortness of breath, cough, wheezing, stridor, or pain with inspiration/expiration  Gastrointestinal: No nausea, vomiting, diarrhea,  or abdominal pain.  Musculoskeletal: No  pain, swelling, weakness  Neurologic: No sensory or motor changes. No confusion or disorientation.      PAST FAM HISTORY  Family History   Problem Relation Age of Onset   • Diabetes Mother    • Cancer Father    • Diabetes Father    • Diabetes Brother        PAST MEDICAL HISTORY   has a past medical history of Arthritis, Dental disorder, Glaucoma, Heart burn, Hypertension, Indigestion, Other specified disorder of intestines, Unspecified cataract, Unspecified disorder of thyroid, Unspecified essential hypertension (10/30/2014), and Unspecified hypothyroidism (10/30/2014).    SOCIAL HISTORY  Social History     Tobacco Use   • Smoking status: Never Smoker   • Smokeless tobacco: Never Used   Vaping Use   • Vaping Use: Never used   Substance and Sexual Activity   • Alcohol use: No   • Drug use: No   • Sexual activity: Not on file  "      SURGICAL HISTORY   has a past surgical history that includes other abdominal surgery (over 40 yrs ago); eye foreign body removal (5/20/2014); appendectomy (1996); lid tightening (8/27/2014); lumbar laminectomy diskectomy (Left, 7/17/2015); other (2-3yrs ago); other (2004); other (1952); eye foreign body removal (Left, 1/29/2016); blepharoplasty (Left, 9/7/2016); cervical disk and fusion anterior (N/A, 6/25/2018); and corpectomy (N/A, 6/25/2018).    CURRENT MEDICATIONS  Home Medications    **Home medications have not yet been reviewed for this encounter**         ALLERGIES  No Known Allergies    PHYSICAL EXAM  VITAL SIGNS: BP (!) 161/71   Pulse 68   Temp 36.4 °C (97.6 °F)   Resp 16   Ht 1.473 m (4' 9.99\")   Wt 61.2 kg (134 lb 14.7 oz)   SpO2 95%   BMI 28.21 kg/m²    Gen: Alert in no apparent distress.  HEENT: No signs of trauma, Bilateral external ears normal, Nose normal. Conjunctiva normal, Non-icteric.   Cardiovascular: Regular rate and rhythm.  Capillary refill less than 3 seconds to all extremities, 2+ distal pulses.  Thorax & Lungs: Normal breath sounds, No respiratory distress, No wheezing bilateral chest rise  Abdomen: Bowel sounds normal, Soft, No tenderness, No masses, No pulsatile masses. No Guarding or rebound  Skin: Warm, Dry, No erythema, No rash noted to exposed areas.   Extremities: Intact distal pulses, No edema  Neurologic: Alert , no facial droop, grossly normal coordination and strength  Psychiatric: Affect pleasant        LABS  Results for orders placed or performed during the hospital encounter of 07/02/22   CBC WITH DIFFERENTIAL   Result Value Ref Range    WBC 6.6 4.8 - 10.8 K/uL    RBC 4.05 (L) 4.20 - 5.40 M/uL    Hemoglobin 11.2 (L) 12.0 - 16.0 g/dL    Hematocrit 34.7 (L) 37.0 - 47.0 %    MCV 85.7 81.4 - 97.8 fL    MCH 27.7 27.0 - 33.0 pg    MCHC 32.3 (L) 33.6 - 35.0 g/dL    RDW 44.4 35.9 - 50.0 fL    Platelet Count 268 164 - 446 K/uL    MPV 9.9 9.0 - 12.9 fL    Neutrophils-Polys " 51.20 44.00 - 72.00 %    Lymphocytes 33.40 22.00 - 41.00 %    Monocytes 8.10 0.00 - 13.40 %    Eosinophils 6.20 0.00 - 6.90 %    Basophils 0.90 0.00 - 1.80 %    Immature Granulocytes 0.20 0.00 - 0.90 %    Nucleated RBC 0.00 /100 WBC    Neutrophils (Absolute) 3.40 2.00 - 7.15 K/uL    Lymphs (Absolute) 2.22 1.00 - 4.80 K/uL    Monos (Absolute) 0.54 0.00 - 0.85 K/uL    Eos (Absolute) 0.41 0.00 - 0.51 K/uL    Baso (Absolute) 0.06 0.00 - 0.12 K/uL    Immature Granulocytes (abs) 0.01 0.00 - 0.11 K/uL    NRBC (Absolute) 0.00 K/uL   COMP METABOLIC PANEL   Result Value Ref Range    Sodium 135 135 - 145 mmol/L    Potassium 3.4 (L) 3.6 - 5.5 mmol/L    Chloride 100 96 - 112 mmol/L    Co2 22 20 - 33 mmol/L    Anion Gap 13.0 7.0 - 16.0    Glucose 109 (H) 65 - 99 mg/dL    Bun 20 8 - 22 mg/dL    Creatinine 1.02 0.50 - 1.40 mg/dL    Calcium 8.9 8.5 - 10.5 mg/dL    AST(SGOT) 17 12 - 45 U/L    ALT(SGPT) 8 2 - 50 U/L    Alkaline Phosphatase 91 30 - 99 U/L    Total Bilirubin 0.3 0.1 - 1.5 mg/dL    Albumin 3.6 3.2 - 4.9 g/dL    Total Protein 7.1 6.0 - 8.2 g/dL    Globulin 3.5 1.9 - 3.5 g/dL    A-G Ratio 1.0 g/dL   MAGNESIUM   Result Value Ref Range    Magnesium 1.6 1.5 - 2.5 mg/dL   TROPONIN   Result Value Ref Range    Troponin T 11 6 - 19 ng/L   ESTIMATED GFR   Result Value Ref Range    GFR (CKD-EPI) 56 (A) >60 mL/min/1.73 m 2   EKG   Result Value Ref Range    Report       [facility] Emergency Dept.    Test Date:  2022  Pt Name:    SARAY WYNN              Department: ER  MRN:        [id number]                      Room:       [facility]  Gender:     Female                       Technician: [id number]  :        [id number]                   Requested By:ER TRIAGE PROTOCOL  Order #:    [id number]                    Reading MD: Tres Solorzano MD    Measurements  Intervals                                Axis  Rate:       73                           P:          70  FL:         167                          QRS:         61  QRSD:       93                           T:          63  QT:         371  QTc:        409    Interpretive Statements  Sinus rhythm  Compared to ECG 06/14/2022 16:55:37  Sinus tachycardia no longer present  Electronically Signed On 7-3-2022 2:09:02 PDT by Tres Solorzano MD         RADIOLOGY  DX-CHEST-PORTABLE (1 VIEW)   Final Result      No evidence of acute cardiopulmonary process.            COURSE & MEDICAL DECISION MAKING  Patient arrives for evaluation of what appears to be hypertension in the setting of gradual onset headache and vague feelings of lightheadedness.  She experienced no symptoms to suggest ACS or PE and does not have any pain to suggest dissection.  Will evaluate the patient for endorgan dysfunction through labs, chest x-ray, and EKG.  Will not attempt emergent treatment of the blood pressure unless there is signs of endorgan dysfunction or she experiences deterioration in someway.  2:06 AM  Reevaluated patient at bedside, she is calm, conversant, and has a blood pressure that is trending down.  She states her headache is slowly going away and she has no visual changes, chest pain, or shortness of breath.  She states understanding of the findings and that there does not appear to be any emergent issues related to her blood pressure.  Her headache was not sudden onset nor was it the worst of her life arguing against the need for CT imaging or LP.  She has no symptoms to suggest meningitis or encephalitis.  As there is no evidence for endorgan dysfunction, I do not feel emergent management of her blood pressure is necessary and I do not feel inpatient management is necessary.  She did state understanding she needs to contact her doctor next week to discuss the episode and her medication regimen.    FINAL IMPRESSION  1. Hypertension, unspecified type    2. Nonintractable headache, unspecified chronicity pattern, unspecified headache type        Electronically signed by: Tres Solorzano M.D.,  7/3/2022 12:17 AM

## 2022-07-03 NOTE — ED TRIAGE NOTES
"Chief Complaint   Patient presents with   • Hypertension     PT reported this evening while watching TV she started having a HA and feeling dizzy,  PT called 911 and was found to have a BP of 180/110     Blood Pressure (Abnormal) 175/91   Pulse 75   Temperature 36.5 °C (97.7 °F) (Temporal)   Respiration 14   Height 1.473 m (4' 9.99\")   Weight 61.2 kg (134 lb 14.7 oz)   Oxygen Saturation 96%   Body Mass Index 28.21 kg/m²     "

## 2022-08-15 ENCOUNTER — HOSPITAL ENCOUNTER (OUTPATIENT)
Dept: LAB | Facility: MEDICAL CENTER | Age: 82
End: 2022-08-15
Attending: FAMILY MEDICINE
Payer: MEDICARE

## 2022-08-15 LAB
ALBUMIN SERPL BCP-MCNC: 3.9 G/DL (ref 3.2–4.9)
ALBUMIN/GLOB SERPL: 1.1 G/DL
ALP SERPL-CCNC: 112 U/L (ref 30–99)
ALT SERPL-CCNC: 13 U/L (ref 2–50)
ANION GAP SERPL CALC-SCNC: 11 MMOL/L (ref 7–16)
AST SERPL-CCNC: 19 U/L (ref 12–45)
BILIRUB SERPL-MCNC: 0.3 MG/DL (ref 0.1–1.5)
BUN SERPL-MCNC: 23 MG/DL (ref 8–22)
CALCIUM SERPL-MCNC: 9.4 MG/DL (ref 8.5–10.5)
CHLORIDE SERPL-SCNC: 101 MMOL/L (ref 96–112)
CHOLEST SERPL-MCNC: 153 MG/DL (ref 100–199)
CO2 SERPL-SCNC: 24 MMOL/L (ref 20–33)
CREAT SERPL-MCNC: 0.89 MG/DL (ref 0.5–1.4)
CRP SERPL HS-MCNC: 8.2 MG/L (ref 0–3)
EST. AVERAGE GLUCOSE BLD GHB EST-MCNC: 131 MG/DL
FASTING STATUS PATIENT QL REPORTED: NORMAL
GFR SERPLBLD CREATININE-BSD FMLA CKD-EPI: 65 ML/MIN/1.73 M 2
GLOBULIN SER CALC-MCNC: 3.4 G/DL (ref 1.9–3.5)
GLUCOSE SERPL-MCNC: 101 MG/DL (ref 65–99)
HBA1C MFR BLD: 6.2 % (ref 4–5.6)
HDLC SERPL-MCNC: 59 MG/DL
LDLC SERPL CALC-MCNC: 80 MG/DL
POTASSIUM SERPL-SCNC: 4.4 MMOL/L (ref 3.6–5.5)
PROT SERPL-MCNC: 7.3 G/DL (ref 6–8.2)
SODIUM SERPL-SCNC: 136 MMOL/L (ref 135–145)
T4 FREE SERPL-MCNC: 1.48 NG/DL (ref 0.93–1.7)
TRIGL SERPL-MCNC: 72 MG/DL (ref 0–149)
TSH SERPL DL<=0.005 MIU/L-ACNC: 6.31 UIU/ML (ref 0.38–5.33)

## 2022-08-15 PROCEDURE — 84439 ASSAY OF FREE THYROXINE: CPT

## 2022-08-15 PROCEDURE — 85652 RBC SED RATE AUTOMATED: CPT

## 2022-08-15 PROCEDURE — 86141 C-REACTIVE PROTEIN HS: CPT

## 2022-08-15 PROCEDURE — 80053 COMPREHEN METABOLIC PANEL: CPT

## 2022-08-15 PROCEDURE — 80061 LIPID PANEL: CPT

## 2022-08-15 PROCEDURE — 84443 ASSAY THYROID STIM HORMONE: CPT

## 2022-08-15 PROCEDURE — 83036 HEMOGLOBIN GLYCOSYLATED A1C: CPT

## 2022-08-15 PROCEDURE — 36415 COLL VENOUS BLD VENIPUNCTURE: CPT

## 2022-08-16 LAB — ERYTHROCYTE [SEDIMENTATION RATE] IN BLOOD BY WESTERGREN METHOD: 22 MM/HOUR (ref 0–25)

## 2022-08-24 ENCOUNTER — HOSPITAL ENCOUNTER (OUTPATIENT)
Facility: MEDICAL CENTER | Age: 82
End: 2022-08-24
Attending: FAMILY MEDICINE
Payer: MEDICARE

## 2022-12-15 ENCOUNTER — HOSPITAL ENCOUNTER (OUTPATIENT)
Dept: LAB | Facility: MEDICAL CENTER | Age: 82
End: 2022-12-15
Attending: FAMILY MEDICINE
Payer: MEDICARE

## 2022-12-15 LAB
T4 FREE SERPL-MCNC: 1.94 NG/DL (ref 0.93–1.7)
TSH SERPL DL<=0.005 MIU/L-ACNC: 8.24 UIU/ML (ref 0.38–5.33)

## 2022-12-15 PROCEDURE — 84439 ASSAY OF FREE THYROXINE: CPT

## 2022-12-15 PROCEDURE — 84443 ASSAY THYROID STIM HORMONE: CPT

## 2022-12-15 PROCEDURE — 36415 COLL VENOUS BLD VENIPUNCTURE: CPT

## 2023-03-24 ENCOUNTER — HOSPITAL ENCOUNTER (OUTPATIENT)
Dept: LAB | Facility: MEDICAL CENTER | Age: 83
End: 2023-03-24
Attending: FAMILY MEDICINE
Payer: MEDICARE

## 2023-03-24 LAB
25(OH)D3 SERPL-MCNC: 30 NG/ML (ref 30–100)
ALBUMIN SERPL BCP-MCNC: 4.1 G/DL (ref 3.2–4.9)
ALBUMIN/GLOB SERPL: 1.1 G/DL
ALP SERPL-CCNC: 108 U/L (ref 30–99)
ALT SERPL-CCNC: 15 U/L (ref 2–50)
ANION GAP SERPL CALC-SCNC: 11 MMOL/L (ref 7–16)
AST SERPL-CCNC: 24 U/L (ref 12–45)
BASOPHILS # BLD AUTO: 0.8 % (ref 0–1.8)
BASOPHILS # BLD: 0.06 K/UL (ref 0–0.12)
BILIRUB SERPL-MCNC: 0.4 MG/DL (ref 0.1–1.5)
BUN SERPL-MCNC: 25 MG/DL (ref 8–22)
CALCIUM ALBUM COR SERPL-MCNC: 9.7 MG/DL (ref 8.5–10.5)
CALCIUM SERPL-MCNC: 9.8 MG/DL (ref 8.5–10.5)
CHLORIDE SERPL-SCNC: 99 MMOL/L (ref 96–112)
CHOLEST SERPL-MCNC: 177 MG/DL (ref 100–199)
CO2 SERPL-SCNC: 26 MMOL/L (ref 20–33)
CREAT SERPL-MCNC: 0.89 MG/DL (ref 0.5–1.4)
EOSINOPHIL # BLD AUTO: 0.28 K/UL (ref 0–0.51)
EOSINOPHIL NFR BLD: 3.6 % (ref 0–6.9)
ERYTHROCYTE [DISTWIDTH] IN BLOOD BY AUTOMATED COUNT: 43.9 FL (ref 35.9–50)
EST. AVERAGE GLUCOSE BLD GHB EST-MCNC: 137 MG/DL
FASTING STATUS PATIENT QL REPORTED: NORMAL
GFR SERPLBLD CREATININE-BSD FMLA CKD-EPI: 65 ML/MIN/1.73 M 2
GLOBULIN SER CALC-MCNC: 3.7 G/DL (ref 1.9–3.5)
GLUCOSE SERPL-MCNC: 114 MG/DL (ref 65–99)
HBA1C MFR BLD: 6.4 % (ref 4–5.6)
HCT VFR BLD AUTO: 40.1 % (ref 37–47)
HDLC SERPL-MCNC: 71 MG/DL
HGB BLD-MCNC: 12.5 G/DL (ref 12–16)
IMM GRANULOCYTES # BLD AUTO: 0.03 K/UL (ref 0–0.11)
IMM GRANULOCYTES NFR BLD AUTO: 0.4 % (ref 0–0.9)
LDLC SERPL CALC-MCNC: 92 MG/DL
LYMPHOCYTES # BLD AUTO: 2.63 K/UL (ref 1–4.8)
LYMPHOCYTES NFR BLD: 34.2 % (ref 22–41)
MCH RBC QN AUTO: 27.3 PG (ref 27–33)
MCHC RBC AUTO-ENTMCNC: 31.2 G/DL (ref 33.6–35)
MCV RBC AUTO: 87.6 FL (ref 81.4–97.8)
MONOCYTES # BLD AUTO: 0.63 K/UL (ref 0–0.85)
MONOCYTES NFR BLD AUTO: 8.2 % (ref 0–13.4)
NEUTROPHILS # BLD AUTO: 4.06 K/UL (ref 2–7.15)
NEUTROPHILS NFR BLD: 52.8 % (ref 44–72)
NRBC # BLD AUTO: 0 K/UL
NRBC BLD-RTO: 0 /100 WBC
PLATELET # BLD AUTO: 282 K/UL (ref 164–446)
PMV BLD AUTO: 11.6 FL (ref 9–12.9)
POTASSIUM SERPL-SCNC: 4.2 MMOL/L (ref 3.6–5.5)
PROT SERPL-MCNC: 7.8 G/DL (ref 6–8.2)
RBC # BLD AUTO: 4.58 M/UL (ref 4.2–5.4)
SODIUM SERPL-SCNC: 136 MMOL/L (ref 135–145)
T4 FREE SERPL-MCNC: 1.68 NG/DL (ref 0.93–1.7)
TRIGL SERPL-MCNC: 72 MG/DL (ref 0–149)
TSH SERPL DL<=0.005 MIU/L-ACNC: 1.6 UIU/ML (ref 0.38–5.33)
WBC # BLD AUTO: 7.7 K/UL (ref 4.8–10.8)

## 2023-03-24 PROCEDURE — 83036 HEMOGLOBIN GLYCOSYLATED A1C: CPT

## 2023-03-24 PROCEDURE — 82306 VITAMIN D 25 HYDROXY: CPT

## 2023-03-24 PROCEDURE — 80061 LIPID PANEL: CPT

## 2023-03-24 PROCEDURE — 84443 ASSAY THYROID STIM HORMONE: CPT

## 2023-03-24 PROCEDURE — 36415 COLL VENOUS BLD VENIPUNCTURE: CPT

## 2023-03-24 PROCEDURE — 84439 ASSAY OF FREE THYROXINE: CPT

## 2023-03-24 PROCEDURE — 80053 COMPREHEN METABOLIC PANEL: CPT

## 2023-03-24 PROCEDURE — 85025 COMPLETE CBC W/AUTO DIFF WBC: CPT

## 2023-06-16 ENCOUNTER — APPOINTMENT (OUTPATIENT)
Dept: RADIOLOGY | Facility: MEDICAL CENTER | Age: 83
End: 2023-06-16
Attending: STUDENT IN AN ORGANIZED HEALTH CARE EDUCATION/TRAINING PROGRAM
Payer: MEDICARE

## 2023-06-16 ENCOUNTER — HOSPITAL ENCOUNTER (EMERGENCY)
Facility: MEDICAL CENTER | Age: 83
End: 2023-06-17
Attending: STUDENT IN AN ORGANIZED HEALTH CARE EDUCATION/TRAINING PROGRAM
Payer: MEDICARE

## 2023-06-16 DIAGNOSIS — I16.0 HYPERTENSIVE URGENCY: ICD-10-CM

## 2023-06-16 DIAGNOSIS — R51.9 ACUTE NONINTRACTABLE HEADACHE, UNSPECIFIED HEADACHE TYPE: ICD-10-CM

## 2023-06-16 DIAGNOSIS — R42 DIZZINESS: ICD-10-CM

## 2023-06-16 DIAGNOSIS — M25.559 HIP PAIN, UNSPECIFIED LATERALITY: ICD-10-CM

## 2023-06-16 LAB
BASOPHILS # BLD AUTO: 0.5 % (ref 0–1.8)
BASOPHILS # BLD: 0.05 K/UL (ref 0–0.12)
EKG IMPRESSION: NORMAL
EOSINOPHIL # BLD AUTO: 0.31 K/UL (ref 0–0.51)
EOSINOPHIL NFR BLD: 3.3 % (ref 0–6.9)
ERYTHROCYTE [DISTWIDTH] IN BLOOD BY AUTOMATED COUNT: 45.9 FL (ref 35.9–50)
HCT VFR BLD AUTO: 37.8 % (ref 37–47)
HGB BLD-MCNC: 12.3 G/DL (ref 12–16)
IMM GRANULOCYTES # BLD AUTO: 0.02 K/UL (ref 0–0.11)
IMM GRANULOCYTES NFR BLD AUTO: 0.2 % (ref 0–0.9)
LYMPHOCYTES # BLD AUTO: 3.09 K/UL (ref 1–4.8)
LYMPHOCYTES NFR BLD: 32.7 % (ref 22–41)
MCH RBC QN AUTO: 27.1 PG (ref 27–33)
MCHC RBC AUTO-ENTMCNC: 32.5 G/DL (ref 32.2–35.5)
MCV RBC AUTO: 83.3 FL (ref 81.4–97.8)
MONOCYTES # BLD AUTO: 0.65 K/UL (ref 0–0.85)
MONOCYTES NFR BLD AUTO: 6.9 % (ref 0–13.4)
NEUTROPHILS # BLD AUTO: 5.34 K/UL (ref 1.82–7.42)
NEUTROPHILS NFR BLD: 56.4 % (ref 44–72)
NRBC # BLD AUTO: 0 K/UL
NRBC BLD-RTO: 0 /100 WBC (ref 0–0.2)
PLATELET # BLD AUTO: 274 K/UL (ref 164–446)
PMV BLD AUTO: 10.3 FL (ref 9–12.9)
RBC # BLD AUTO: 4.54 M/UL (ref 4.2–5.4)
WBC # BLD AUTO: 9.5 K/UL (ref 4.8–10.8)

## 2023-06-16 PROCEDURE — 85025 COMPLETE CBC W/AUTO DIFF WBC: CPT

## 2023-06-16 PROCEDURE — 99285 EMERGENCY DEPT VISIT HI MDM: CPT

## 2023-06-16 PROCEDURE — 84484 ASSAY OF TROPONIN QUANT: CPT

## 2023-06-16 PROCEDURE — 80053 COMPREHEN METABOLIC PANEL: CPT

## 2023-06-16 PROCEDURE — 700101 HCHG RX REV CODE 250: Mod: UD | Performed by: STUDENT IN AN ORGANIZED HEALTH CARE EDUCATION/TRAINING PROGRAM

## 2023-06-16 PROCEDURE — 93005 ELECTROCARDIOGRAM TRACING: CPT | Performed by: STUDENT IN AN ORGANIZED HEALTH CARE EDUCATION/TRAINING PROGRAM

## 2023-06-16 PROCEDURE — 70450 CT HEAD/BRAIN W/O DYE: CPT

## 2023-06-16 PROCEDURE — 36415 COLL VENOUS BLD VENIPUNCTURE: CPT

## 2023-06-16 PROCEDURE — 96374 THER/PROPH/DIAG INJ IV PUSH: CPT

## 2023-06-16 PROCEDURE — 85652 RBC SED RATE AUTOMATED: CPT

## 2023-06-16 PROCEDURE — 93005 ELECTROCARDIOGRAM TRACING: CPT

## 2023-06-16 PROCEDURE — 71045 X-RAY EXAM CHEST 1 VIEW: CPT

## 2023-06-16 PROCEDURE — 86140 C-REACTIVE PROTEIN: CPT

## 2023-06-16 RX ORDER — LABETALOL HYDROCHLORIDE 5 MG/ML
10 INJECTION, SOLUTION INTRAVENOUS ONCE
Status: COMPLETED | OUTPATIENT
Start: 2023-06-17 | End: 2023-06-16

## 2023-06-16 RX ADMIN — LABETALOL HYDROCHLORIDE 10 MG: 5 INJECTION INTRAVENOUS at 23:54

## 2023-06-16 ASSESSMENT — FIBROSIS 4 INDEX: FIB4 SCORE: 1.78

## 2023-06-16 ASSESSMENT — LIFESTYLE VARIABLES: DO YOU DRINK ALCOHOL: NO

## 2023-06-17 ENCOUNTER — APPOINTMENT (OUTPATIENT)
Dept: RADIOLOGY | Facility: MEDICAL CENTER | Age: 83
End: 2023-06-17
Attending: STUDENT IN AN ORGANIZED HEALTH CARE EDUCATION/TRAINING PROGRAM
Payer: MEDICARE

## 2023-06-17 VITALS
WEIGHT: 125 LBS | OXYGEN SATURATION: 95 % | SYSTOLIC BLOOD PRESSURE: 166 MMHG | DIASTOLIC BLOOD PRESSURE: 70 MMHG | HEIGHT: 58 IN | HEART RATE: 67 BPM | BODY MASS INDEX: 26.24 KG/M2 | RESPIRATION RATE: 17 BRPM | TEMPERATURE: 97.3 F

## 2023-06-17 LAB
ALBUMIN SERPL BCP-MCNC: 3.9 G/DL (ref 3.2–4.9)
ALBUMIN/GLOB SERPL: 1 G/DL
ALP SERPL-CCNC: 95 U/L (ref 30–99)
ALT SERPL-CCNC: 16 U/L (ref 2–50)
ANION GAP SERPL CALC-SCNC: 12 MMOL/L (ref 7–16)
AST SERPL-CCNC: 21 U/L (ref 12–45)
BILIRUB SERPL-MCNC: 0.3 MG/DL (ref 0.1–1.5)
BUN SERPL-MCNC: 35 MG/DL (ref 8–22)
CALCIUM ALBUM COR SERPL-MCNC: 9.7 MG/DL (ref 8.5–10.5)
CALCIUM SERPL-MCNC: 9.6 MG/DL (ref 8.5–10.5)
CHLORIDE SERPL-SCNC: 98 MMOL/L (ref 96–112)
CO2 SERPL-SCNC: 22 MMOL/L (ref 20–33)
CREAT SERPL-MCNC: 1.12 MG/DL (ref 0.5–1.4)
CRP SERPL HS-MCNC: 0.46 MG/DL (ref 0–0.75)
ERYTHROCYTE [SEDIMENTATION RATE] IN BLOOD BY WESTERGREN METHOD: 20 MM/HOUR (ref 0–25)
GFR SERPLBLD CREATININE-BSD FMLA CKD-EPI: 49 ML/MIN/1.73 M 2
GLOBULIN SER CALC-MCNC: 3.8 G/DL (ref 1.9–3.5)
GLUCOSE SERPL-MCNC: 107 MG/DL (ref 65–99)
POTASSIUM SERPL-SCNC: 4.7 MMOL/L (ref 3.6–5.5)
PROT SERPL-MCNC: 7.7 G/DL (ref 6–8.2)
SODIUM SERPL-SCNC: 132 MMOL/L (ref 135–145)
TROPONIN T SERPL-MCNC: 8 NG/L (ref 6–19)

## 2023-06-17 PROCEDURE — 700111 HCHG RX REV CODE 636 W/ 250 OVERRIDE (IP): Mod: UD | Performed by: STUDENT IN AN ORGANIZED HEALTH CARE EDUCATION/TRAINING PROGRAM

## 2023-06-17 PROCEDURE — 96375 TX/PRO/DX INJ NEW DRUG ADDON: CPT

## 2023-06-17 PROCEDURE — A9270 NON-COVERED ITEM OR SERVICE: HCPCS | Mod: UD | Performed by: STUDENT IN AN ORGANIZED HEALTH CARE EDUCATION/TRAINING PROGRAM

## 2023-06-17 PROCEDURE — 700102 HCHG RX REV CODE 250 W/ 637 OVERRIDE(OP): Mod: UD | Performed by: STUDENT IN AN ORGANIZED HEALTH CARE EDUCATION/TRAINING PROGRAM

## 2023-06-17 PROCEDURE — 73521 X-RAY EXAM HIPS BI 2 VIEWS: CPT

## 2023-06-17 PROCEDURE — 700101 HCHG RX REV CODE 250: Mod: UD | Performed by: STUDENT IN AN ORGANIZED HEALTH CARE EDUCATION/TRAINING PROGRAM

## 2023-06-17 RX ORDER — ACETAMINOPHEN 325 MG/1
650 TABLET ORAL ONCE
Status: COMPLETED | OUTPATIENT
Start: 2023-06-17 | End: 2023-06-17

## 2023-06-17 RX ORDER — LIDOCAINE 50 MG/G
1 PATCH TOPICAL EVERY 24 HOURS
Qty: 10 PATCH | Refills: 0 | Status: SHIPPED | OUTPATIENT
Start: 2023-06-17

## 2023-06-17 RX ORDER — KETOROLAC TROMETHAMINE 30 MG/ML
15 INJECTION, SOLUTION INTRAMUSCULAR; INTRAVENOUS ONCE
Status: COMPLETED | OUTPATIENT
Start: 2023-06-17 | End: 2023-06-17

## 2023-06-17 RX ORDER — PROPARACAINE HYDROCHLORIDE 5 MG/ML
1 SOLUTION/ DROPS OPHTHALMIC ONCE
Status: COMPLETED | OUTPATIENT
Start: 2023-06-17 | End: 2023-06-17

## 2023-06-17 RX ADMIN — PROPARACAINE HYDROCHLORIDE 1 DROP: 5 SOLUTION/ DROPS OPHTHALMIC at 01:40

## 2023-06-17 RX ADMIN — ACETAMINOPHEN 650 MG: 325 TABLET, FILM COATED ORAL at 00:54

## 2023-06-17 RX ADMIN — KETOROLAC TROMETHAMINE 15 MG: 30 INJECTION, SOLUTION INTRAMUSCULAR; INTRAVENOUS at 00:56

## 2023-06-17 ASSESSMENT — PAIN DESCRIPTION - PAIN TYPE
TYPE: ACUTE PAIN
TYPE: ACUTE PAIN

## 2023-06-17 NOTE — DISCHARGE INSTRUCTIONS
Your blood pressure was elevated today. An elevated blood pressure alone without new symptoms is not immediately dangerous and is generally not treated in the emergency department (ED). Things like stress and pain can temporarily raise your blood pressure. However, chronically elevated blood pressure (hypertension) can put you at risk for serious conditions such as stroke, heart attack, or kidney failure. You can receive a diagnosis of 'hypertension' if your blood pressure is consistently elevated over multiple visits to the doctor. After evaluating you, we feel that you are safe to go home.    Steps to take at home:    Take any medications as prescribed.  Eat a healthy diet, avoiding high salt foods such as chips, pickles, and pizza.  Exercise and try to maintain a healthy weight.  Avoid tobacco and street drugs.  Avoid over-the-counter decongestant medications unless recommended by a doctor.  Monitor your blood pressure, ideally with an automatic blood pressure machine at home.  Bring a list of your blood pressure values to your follow up appointment.  Follow up with your primary care doctor within one to two weeks to monitor your blood pressure and general health..    Please speak to your doctor or come back to the ED for new symptoms, such as severe headache, dizziness, confusion, weakness in an arm or leg, trouble speaking, abdominal pain, chest pain, difficulty breathing, or other new or worsening symptoms. Please review medication inserts for side effects and call the ED if you have any questions about the medications or care you received.

## 2023-06-17 NOTE — ED NOTES
Pt discharged to home. Discharge paperwork provided. Education provided by ERP.  Pt was given follow up instructions and prescriptions.   Pt verbalized understanding of all instructions for discharge. Patient via wheelchair alert and oriented x 4, out of ER with all belongings accompanied by pt's family

## 2023-06-17 NOTE — ED TRIAGE NOTES
"Chief Complaint   Patient presents with    Headache     With dizziness.  Started today at 1930.      Blood Pressure Problem    Hip Pain     Fall 10 days ago, pain to the R side with numbness       Pt wheeled to triage, BIB EMS. Pt A&Ox4, came in for above complaints.    Patient states she had an ear surgery many years ago, and since then she has had L sided facial droop.  She states that how it is now is not changed from baseline.    EMS gave 1g Tylenol and placed a PIV.     Pt to lobby . Pt educated on alerting staff in changes to condition. Pt verbalized understanding. Protocol ordered.     BP (!) 242/112   Pulse 86   Temp 36.3 °C (97.4 °F) (Temporal)   Resp 18   Ht 1.473 m (4' 10\")   Wt 56.7 kg (125 lb)   SpO2 96%   BMI 26.13 kg/m²     "

## 2023-06-17 NOTE — ED PROVIDER NOTES
ED Provider Note    CHIEF COMPLAINT  Chief Complaint   Patient presents with    Headache     With dizziness.  Started today at 1930.      Blood Pressure Problem    Hip Pain     Fall 10 days ago, pain to the R side with numbness       EXTERNAL RECORDS REVIEWED  Other was seen in the ER in July 2022 for hypertension at that time she was having a headache and felt dizzy.    HPI/ROS  LIMITATION TO HISTORY   [place]guillan speaking but prefers to have niece translate  OUTSIDE HISTORIAN(S):  Family : Niece at bedside provides majority of history    Debra Stockton is a 83 y.o. female who presents with dizziness.  Her niece said that she checked on her today around noon and she was in her usual state of health.  She has been complaining of back pain due to sciatica but this is has been an ongoing issue and she is due to see her PCP next week.  She said however she received a call from 911 this evening as she reportedly stood up quickly  and felt sudden onset dizziness with a headache associated.  No vertigo but states unsteady and light headed. She does describes the headache as sudden but not necessarily the worst of her life. It was mostly frontal and is now mostly on the left side.  No associated next pain.  No associated with any nausea or vomiting.  This was around 730 and she had called her panic button.  She denies denies any unilateral numbness, weakness, speech changes, difficulty swallowing, vertigo or difficulty with gait or coordination.  She does typically have a history of high blood pressure and is compliant with all her medications including lisinopril and metoprolol.  She denies fevers, chills or recent illness.   She states her symptoms are currently improving, she has residual left sided headache.  She also has some right sided hip pain from a fall 10 days ago. She has been ambulating without difficulty.     PAST MEDICAL HISTORY   has a past medical history of Arthritis, Dental disorder, Glaucoma, Heart burn,  "Hypertension, Indigestion, Other specified disorder of intestines, Unspecified cataract, Unspecified disorder of thyroid, Unspecified essential hypertension (10/30/2014), and Unspecified hypothyroidism (10/30/2014).    SURGICAL HISTORY   has a past surgical history that includes other abdominal surgery (over 40 yrs ago); eye foreign body removal (5/20/2014); appendectomy (1996); lid tightening (8/27/2014); lumbar laminectomy diskectomy (Left, 7/17/2015); other (2-3yrs ago); other (2004); other (1952); eye foreign body removal (Left, 1/29/2016); blepharoplasty (Left, 9/7/2016); cervical disk and fusion anterior (N/A, 6/25/2018); and corpectomy (N/A, 6/25/2018).    FAMILY HISTORY  Family History   Problem Relation Age of Onset    Diabetes Mother     Cancer Father     Diabetes Father     Diabetes Brother        SOCIAL HISTORY  Social History     Tobacco Use    Smoking status: Never    Smokeless tobacco: Never   Vaping Use    Vaping Use: Never used   Substance and Sexual Activity    Alcohol use: No    Drug use: No    Sexual activity: Not on file       CURRENT MEDICATIONS  Home Medications       Reviewed by Karie Darling R.N. (Registered Nurse) on 06/16/23 at 2158  Med List Status: Partial     Medication Last Dose Status   aspirin 81 MG EC tablet  Active   COMBIGAN 0.2-0.5 % Solution  Active   dorzolamide (TRUSOPT) 2 % Solution  Active   levothyroxine (SYNTHROID) 75 MCG Tab  Active   lisinopril-hydrochlorothiazide (PRINZIDE) 20-12.5 MG per tablet  Active   Loratadine (CLARITIN) 10 MG Cap  Active   meclizine (ANTIVERT) 25 MG Tab  Active   metoprolol SR (TOPROL XL) 25 MG TABLET SR 24 HR  Active   TRAVATAN Z 0.004 % Solution  Active                    ALLERGIES  No Known Allergies    PHYSICAL EXAM  VITAL SIGNS: BP (!) 166/70   Pulse 67   Temp 36.3 °C (97.3 °F) (Temporal)   Resp 17   Ht 1.473 m (4' 10\")   Wt 56.7 kg (125 lb)   SpO2 95%   BMI 26.13 kg/m²    Constitutional: Awake and alert . Non toxic,  HENT: Normal " inspection  . Moist  mucous membranes  Eyes: Left eye patched.  Right eye normal appearing (IOP 12).   Neck: Grossly normal range of motion.  Cardiovascular: Normal heart rate, Normal rhythm.  Symmetric peripheral pulses.   Thorax & Lungs: No respiratory distress, No wheezing, No rales, No rhonchi  Abdomen: Soft, non-distended, nontender to palpation in all 4 quadrants, no mass  Skin: No obvious rash.  Extremities: Warm, well perfused. Mild right hip TTP.  No clubbing, cyanosis, edema   Neurologic:   A&O x 4. CN II-XII tested and intact, other than mild left sided facial droop (states chronic)  Sensation intact to light touch in all extremities.  Motor: Normal tone and bulk. No abnormal movements appreciated. No pronator drift. Strength tested and 5/5 in bilateral wrist flexion/extension, elbow flexion/extension, shoulder abduction, straight leg raise, knee flexion/extension, ankle dorsiflexion/plantarflexion. Patient ambulates with a steady gait.  Coordination: Finger to nose  intact bilaterally.  Psychiatric: Normal for situation      DIAGNOSTIC STUDIES / PROCEDURES  EKG  I have independently interpreted this EKG  Results for orders placed or performed during the hospital encounter of 23   EKG (NOW)   Result Value Ref Range    Report       [facility] Emergency Dept.    Test Date:  2023  Pt Name:    SARAY WYNN              Department: ER  MRN:        [id number]                      Room:  Gender:     Female                       Technician: [id number]  :        1940                   Requested By:ER TRIAGE PROTOCOL  Order #:    [id number]                    Reading MD: Belen Carmichael    Measurements  Intervals                                Axis  Rate:       77                           P:          62  MN:         148                          QRS:        37  QRSD:       102                          T:          49  QT:         363  QTc:        411    Interpretive Statements  Sinus  rhythm  Atrial premature complexes  Probable left atrial enlargement  Low voltage, precordial leads  Baseline wander in lead(s) V5  Compared to ECG 07/03/2022 00:41:50  Atrial premature complex(es) now present  Low QRS voltage now present  Electronically Signed On 6- 23:42:22 PDT by Belen Carmichael           LABS  Results for orders placed or performed during the hospital encounter of 06/16/23   CBC with Differential   Result Value Ref Range    WBC 9.5 4.8 - 10.8 K/uL    RBC 4.54 4.20 - 5.40 M/uL    Hemoglobin 12.3 12.0 - 16.0 g/dL    Hematocrit 37.8 37.0 - 47.0 %    MCV 83.3 81.4 - 97.8 fL    MCH 27.1 27.0 - 33.0 pg    MCHC 32.5 32.2 - 35.5 g/dL    RDW 45.9 35.9 - 50.0 fL    Platelet Count 274 164 - 446 K/uL    MPV 10.3 9.0 - 12.9 fL    Neutrophils-Polys 56.40 44.00 - 72.00 %    Lymphocytes 32.70 22.00 - 41.00 %    Monocytes 6.90 0.00 - 13.40 %    Eosinophils 3.30 0.00 - 6.90 %    Basophils 0.50 0.00 - 1.80 %    Immature Granulocytes 0.20 0.00 - 0.90 %    Nucleated RBC 0.00 0.00 - 0.20 /100 WBC    Neutrophils (Absolute) 5.34 1.82 - 7.42 K/uL    Lymphs (Absolute) 3.09 1.00 - 4.80 K/uL    Monos (Absolute) 0.65 0.00 - 0.85 K/uL    Eos (Absolute) 0.31 0.00 - 0.51 K/uL    Baso (Absolute) 0.05 0.00 - 0.12 K/uL    Immature Granulocytes (abs) 0.02 0.00 - 0.11 K/uL    NRBC (Absolute) 0.00 K/uL   Comp Metabolic Panel   Result Value Ref Range    Sodium 132 (L) 135 - 145 mmol/L    Potassium 4.7 3.6 - 5.5 mmol/L    Chloride 98 96 - 112 mmol/L    Co2 22 20 - 33 mmol/L    Anion Gap 12.0 7.0 - 16.0    Glucose 107 (H) 65 - 99 mg/dL    Bun 35 (H) 8 - 22 mg/dL    Creatinine 1.12 0.50 - 1.40 mg/dL    Calcium 9.6 8.5 - 10.5 mg/dL    AST(SGOT) 21 12 - 45 U/L    ALT(SGPT) 16 2 - 50 U/L    Alkaline Phosphatase 95 30 - 99 U/L    Total Bilirubin 0.3 0.1 - 1.5 mg/dL    Albumin 3.9 3.2 - 4.9 g/dL    Total Protein 7.7 6.0 - 8.2 g/dL    Globulin 3.8 (H) 1.9 - 3.5 g/dL    A-G Ratio 1.0 g/dL   TROPONIN   Result Value Ref Range    Troponin T 8  6 - 19 ng/L   Sed Rate   Result Value Ref Range    Sed Rate Westergren 20 0 - 25 mm/hour   CRP QUANTITIVE (NON-CARDIAC)   Result Value Ref Range    Stat C-Reactive Protein 0.46 0.00 - 0.75 mg/dL   ESTIMATED GFR   Result Value Ref Range    GFR (CKD-EPI) 49 (A) >60 mL/min/1.73 m 2   CORRECTED CALCIUM   Result Value Ref Range    Correct Calcium 9.7 8.5 - 10.5 mg/dL   EKG (NOW)   Result Value Ref Range    Report       [facility] Emergency Dept.    Test Date:  2023  Pt Name:    SARAY WYNN              Department: ER  MRN:        [id number]                      Room:  Gender:     Female                       Technician: [id number]  :        1940                   Requested By:ER TRIAGE PROTOCOL  Order #:    [id number]                    Reading MD: Belen Carmichael    Measurements  Intervals                                Axis  Rate:       77                           P:          62  HI:         148                          QRS:        37  QRSD:       102                          T:          49  QT:         363  QTc:        411    Interpretive Statements  Sinus rhythm  Atrial premature complexes  Probable left atrial enlargement  Low voltage, precordial leads  Baseline wander in lead(s) V5  Compared to ECG 2022 00:41:50  Atrial premature complex(es) now present  Low QRS voltage now present  Electronically Signed On 2023 23:42:22 PDT by Beeln Carmichael           RADIOLOGY  I have independently interpreted the diagnostic imaging associated with this visit and am waiting the final reading from the radiologist.   My preliminary interpretation is as follows: No obvious bleed  Radiologist interpretation:   DX-HIP-BILATERAL-WITH PELVIS-2 VIEWS   Final Result      No radiographic evidence of acute traumatic injury.      Large volume stool      Mild left peritrochanteric calcification may indicate calcific tendinopathy      CT-HEAD W/O   Final Result      No noncontrast CT evidence of acute  intracranial hemorrhage.      Stable left suboccipital craniectomy with underlying cerebellar encephalomalacia, left posterior beatris hole with mild underlying encephalomalacia      Moderate white matter hypodensity is stable.  This is a nonspecific finding which usually is found to represent chronic microvascular disease in patient's of this demographic.  Demyelination, age indeterminant ischemia and gliosis are also common    possibilities.      DX-CHEST-PORTABLE (1 VIEW)   Final Result      Stable mild cardiac silhouette enlargement and large lung volumes            COURSE & MEDICAL DECISION MAKING    ED Observation Status? Yes; I am placing the patient in to an observation status due to a diagnostic uncertainty as well as therapeutic intensity. Patient placed in observation status at 23.07 PM, 6/16/2023.     Observation plan is as follows: IV, Labs, CT, Serial Exams    Upon Reevaluation, the patient's condition has: Improved; and will be discharged.    Patient discharged from ED Observation status at 1.38 AM 6/17    INITIAL ASSESSMENT, COURSE AND PLAN  Care Narrative: 83 yr old with a history of hypertension who presents with dizziness with headache.  She is hypertensive on arrival, neurologically intact, NIH stroke scale 0.   Labs are overall reassuring, she is mildly hyponatremic, no leukocytosis.  EKG reviewed without signs of dysrhythmia or ischemia.  She denies any chest pain, ACS is very unlikely also with a normal troponin.  Regarding her headache,  I did consider subarachnoid hemorrhage given the sudden onset.  CT head without any evidence of bleed and SAH can be ruled out as imaging was obtained within 6 hours of symptom onset.  She does not have any vertigo or other posterior stroke symptoms (diplopia, dysphagia etc) to warrant advanced imaging.  I doubt temporal arteritis as she has normal inflammatory markers.  Intraocular pressure is normal and doubt acute angle closure glaucoma.  She is nontoxic with  normal status and doubt CNS infection.  I did give labetalol for blood pressure control and during her time the ER blood pressure started to improve.  I did obtain an x-ray of her hip  given her recent fall, which was normal without any signs of bony injury.  Headache was treated with Toradol and Tylenol and she continues to report improvement of her symptoms. Her headache and dizziness have almost entirely resolved.  I suspect her symptoms were either related to orthostasis and/or elevated BP today.  In the ER, she has no signs of end organ damage or hypertensive emergency. With shared decision-making, Debra prefers to discharge home and follow-up with her primary doctor for further management which I do think is reasonable.  Given her improved blood pressure I would not adjust medications at this time but she understands the importance of following up with her primary doctor for this.  I answered all the patient and her niece's questions and she was discharged home in improved condition.    HTN/IDDM FOLLOW UP:  The patient has known hypertension and is being followed by their primary care doctor        DISPOSITION AND DISCUSSIONS  I have discussed management of the patient with the following physicians and RANJIT's:  None    Discussion of management with other QHP or appropriate source(s): None     Escalation of care considered, and ultimately not performed:after discussion with the patient / family, they have elected to decline an escalation in care    Barriers to care at this time, including but not limited to:  None .     Decision tools and prescription drugs considered including, but not limited to: NIH Stroke Scale 0 .    FINAL DIAGNOSIS  1. Hypertensive urgency Acute   2. Acute nonintractable headache, unspecified headache type Acute   3. Dizziness Acute   4. Hip pain, unspecified laterality           Electronically signed by: Belen Carmichael M.D., 6/16/2023 11:37 PM

## 2023-08-12 ENCOUNTER — HOSPITAL ENCOUNTER (EMERGENCY)
Facility: MEDICAL CENTER | Age: 83
End: 2023-08-13
Attending: EMERGENCY MEDICINE
Payer: MEDICARE

## 2023-08-12 ENCOUNTER — APPOINTMENT (OUTPATIENT)
Dept: RADIOLOGY | Facility: MEDICAL CENTER | Age: 83
End: 2023-08-12
Attending: EMERGENCY MEDICINE
Payer: MEDICARE

## 2023-08-12 DIAGNOSIS — I16.0 HYPERTENSIVE URGENCY: ICD-10-CM

## 2023-08-12 DIAGNOSIS — R51.9 NONINTRACTABLE HEADACHE, UNSPECIFIED CHRONICITY PATTERN, UNSPECIFIED HEADACHE TYPE: ICD-10-CM

## 2023-08-12 LAB
ALBUMIN SERPL BCP-MCNC: 4.3 G/DL (ref 3.2–4.9)
ALBUMIN/GLOB SERPL: 1.3 G/DL
ALP SERPL-CCNC: 87 U/L (ref 30–99)
ALT SERPL-CCNC: 14 U/L (ref 2–50)
ANION GAP SERPL CALC-SCNC: 14 MMOL/L (ref 7–16)
AST SERPL-CCNC: 23 U/L (ref 12–45)
BASOPHILS # BLD AUTO: 0.5 % (ref 0–1.8)
BASOPHILS # BLD: 0.04 K/UL (ref 0–0.12)
BILIRUB SERPL-MCNC: 0.3 MG/DL (ref 0.1–1.5)
BUN SERPL-MCNC: 44 MG/DL (ref 8–22)
CALCIUM ALBUM COR SERPL-MCNC: 9.5 MG/DL (ref 8.5–10.5)
CALCIUM SERPL-MCNC: 9.7 MG/DL (ref 8.5–10.5)
CHLORIDE SERPL-SCNC: 102 MMOL/L (ref 96–112)
CO2 SERPL-SCNC: 21 MMOL/L (ref 20–33)
CREAT SERPL-MCNC: 1.36 MG/DL (ref 0.5–1.4)
EKG IMPRESSION: NORMAL
EOSINOPHIL # BLD AUTO: 0.3 K/UL (ref 0–0.51)
EOSINOPHIL NFR BLD: 3.5 % (ref 0–6.9)
ERYTHROCYTE [DISTWIDTH] IN BLOOD BY AUTOMATED COUNT: 48.2 FL (ref 35.9–50)
GFR SERPLBLD CREATININE-BSD FMLA CKD-EPI: 39 ML/MIN/1.73 M 2
GLOBULIN SER CALC-MCNC: 3.3 G/DL (ref 1.9–3.5)
GLUCOSE SERPL-MCNC: 115 MG/DL (ref 65–99)
HCT VFR BLD AUTO: 36.4 % (ref 37–47)
HGB BLD-MCNC: 11 G/DL (ref 12–16)
IMM GRANULOCYTES # BLD AUTO: 0.03 K/UL (ref 0–0.11)
IMM GRANULOCYTES NFR BLD AUTO: 0.4 % (ref 0–0.9)
LYMPHOCYTES # BLD AUTO: 3.31 K/UL (ref 1–4.8)
LYMPHOCYTES NFR BLD: 39 % (ref 22–41)
MCH RBC QN AUTO: 25.9 PG (ref 27–33)
MCHC RBC AUTO-ENTMCNC: 30.2 G/DL (ref 32.2–35.5)
MCV RBC AUTO: 85.8 FL (ref 81.4–97.8)
MONOCYTES # BLD AUTO: 0.74 K/UL (ref 0–0.85)
MONOCYTES NFR BLD AUTO: 8.7 % (ref 0–13.4)
NEUTROPHILS # BLD AUTO: 4.06 K/UL (ref 1.82–7.42)
NEUTROPHILS NFR BLD: 47.9 % (ref 44–72)
NRBC # BLD AUTO: 0 K/UL
NRBC BLD-RTO: 0 /100 WBC (ref 0–0.2)
NT-PROBNP SERPL IA-MCNC: 2198 PG/ML (ref 0–125)
PLATELET # BLD AUTO: 211 K/UL (ref 164–446)
PMV BLD AUTO: 11.4 FL (ref 9–12.9)
POTASSIUM SERPL-SCNC: 4.1 MMOL/L (ref 3.6–5.5)
PROT SERPL-MCNC: 7.6 G/DL (ref 6–8.2)
RBC # BLD AUTO: 4.24 M/UL (ref 4.2–5.4)
SODIUM SERPL-SCNC: 137 MMOL/L (ref 135–145)
TROPONIN T SERPL-MCNC: 8 NG/L (ref 6–19)
WBC # BLD AUTO: 8.5 K/UL (ref 4.8–10.8)

## 2023-08-12 PROCEDURE — 36415 COLL VENOUS BLD VENIPUNCTURE: CPT

## 2023-08-12 PROCEDURE — 93005 ELECTROCARDIOGRAM TRACING: CPT | Performed by: EMERGENCY MEDICINE

## 2023-08-12 PROCEDURE — 700102 HCHG RX REV CODE 250 W/ 637 OVERRIDE(OP): Mod: UD | Performed by: EMERGENCY MEDICINE

## 2023-08-12 PROCEDURE — 84484 ASSAY OF TROPONIN QUANT: CPT

## 2023-08-12 PROCEDURE — 83880 ASSAY OF NATRIURETIC PEPTIDE: CPT

## 2023-08-12 PROCEDURE — 96374 THER/PROPH/DIAG INJ IV PUSH: CPT

## 2023-08-12 PROCEDURE — 700111 HCHG RX REV CODE 636 W/ 250 OVERRIDE (IP): Mod: UD | Performed by: EMERGENCY MEDICINE

## 2023-08-12 PROCEDURE — 80053 COMPREHEN METABOLIC PANEL: CPT

## 2023-08-12 PROCEDURE — 85025 COMPLETE CBC W/AUTO DIFF WBC: CPT

## 2023-08-12 PROCEDURE — 99285 EMERGENCY DEPT VISIT HI MDM: CPT

## 2023-08-12 PROCEDURE — A9270 NON-COVERED ITEM OR SERVICE: HCPCS | Mod: UD | Performed by: EMERGENCY MEDICINE

## 2023-08-12 RX ORDER — SODIUM CHLORIDE, SODIUM LACTATE, POTASSIUM CHLORIDE, CALCIUM CHLORIDE 600; 310; 30; 20 MG/100ML; MG/100ML; MG/100ML; MG/100ML
1000 INJECTION, SOLUTION INTRAVENOUS ONCE
Status: COMPLETED | OUTPATIENT
Start: 2023-08-13 | End: 2023-08-13

## 2023-08-12 RX ORDER — ASPIRIN 81 MG/1
324 TABLET, CHEWABLE ORAL ONCE
Status: COMPLETED | OUTPATIENT
Start: 2023-08-12 | End: 2023-08-12

## 2023-08-12 RX ORDER — HYDRALAZINE HYDROCHLORIDE 20 MG/ML
10 INJECTION INTRAMUSCULAR; INTRAVENOUS
Status: DISCONTINUED | OUTPATIENT
Start: 2023-08-12 | End: 2023-08-13 | Stop reason: HOSPADM

## 2023-08-12 RX ADMIN — ASPIRIN 81 MG 324 MG: 81 TABLET ORAL at 23:05

## 2023-08-12 RX ADMIN — HYDRALAZINE HYDROCHLORIDE 10 MG: 20 INJECTION, SOLUTION INTRAMUSCULAR; INTRAVENOUS at 23:06

## 2023-08-12 ASSESSMENT — FIBROSIS 4 INDEX: FIB4 SCORE: 1.55

## 2023-08-12 ASSESSMENT — PAIN DESCRIPTION - PAIN TYPE: TYPE: ACUTE PAIN

## 2023-08-13 VITALS
SYSTOLIC BLOOD PRESSURE: 103 MMHG | BODY MASS INDEX: 26.24 KG/M2 | HEART RATE: 65 BPM | RESPIRATION RATE: 13 BRPM | OXYGEN SATURATION: 100 % | HEIGHT: 58 IN | WEIGHT: 125 LBS | DIASTOLIC BLOOD PRESSURE: 47 MMHG | TEMPERATURE: 97.6 F

## 2023-08-13 PROCEDURE — 70450 CT HEAD/BRAIN W/O DYE: CPT

## 2023-08-13 PROCEDURE — 700105 HCHG RX REV CODE 258: Mod: JZ,UD | Performed by: EMERGENCY MEDICINE

## 2023-08-13 RX ADMIN — SODIUM CHLORIDE, POTASSIUM CHLORIDE, SODIUM LACTATE AND CALCIUM CHLORIDE 1000 ML: 600; 310; 30; 20 INJECTION, SOLUTION INTRAVENOUS at 00:05

## 2023-08-13 NOTE — ED TRIAGE NOTES
83 y.o.  Female    Chief Complaint   Patient presents with    Headache     Pt BIB EMS from home who lives alone with above complaint secondary to HTN.  GCS 15.   No treatment given enroute.      BP  211/99 HR 81 O2 sat - 95% room air.     Pt back to room Blue 22 with steady gait. Pt placed into gown and placed on the monitor. PIV established from EMS. Blood drawn sent to lab. Charted up for ERP.      BP (!) 234/94   Pulse 70   Temp 36.3 °C (97.4 °F) (Temporal)   Resp 16   SpO2 100%

## 2023-08-13 NOTE — ED NOTES
Lao service rendered, Staff name Lary with ID # [id number].    Pt discharged to home. Discharge paperwork provided. Education provided by ERP. Reinforced discharge instructions.  Pt was given follow up instructions. Pt verbalized understanding of all instructions for discharge.   Requesting for transport contacted [facility].

## 2023-08-13 NOTE — ED NOTES
Checked on bed, connected to monitor,  with unlabored respirations. Vital signs monitored. Pt sleeping at interval  Denied any new complaints. No current needs identified.  Gurney in low position, side rail up for pt safety. Call light within reach.

## 2023-08-13 NOTE — ED NOTES
Thai service requested to get more history.   Abel with ID [id number].    Pt denies any stroke with severe headache started @ 2000h today, dizziness and nervousness. Denies any CP trauma and SOB.

## 2023-08-13 NOTE — ED NOTES
Patient went out of the ER ambulatory using walker with steady gait assisted by ER tech Dakota., alert and oriented x 4, with all belongings.

## 2023-08-13 NOTE — ED NOTES
Used [employer] service . Staff  with ID # [id number].    Informed Pt walker cannot locate. Per EMS staff no walker with them.  Pt agreed and verbalized understanding.   Request for new walker and to call the cab.

## 2023-08-13 NOTE — ED PROVIDER NOTES
ED Provider Note    CHIEF COMPLAINT:  Chief Complaint   Patient presents with    Headache       EXTERNAL RECORDS REVIEWED  Outpatient Notes      HPI/ROS:    LIMITATION TO HISTORY   Select: : None  OUTSIDE HISTORIAN(S):      Debra Stockton is a 83 y.o. female who presents to the emergency department chief complaint of headache.  Headache began earlier in the morning.  She stated that she had never had a headache like this.  She also reported that her blood pressures been high despite taking 3 of her blood pressure medicines.  Patient also reports that she has had swelling in her lower extremities and some slight pain in her feet bilaterally.  She states she has had normal urination normal bowel movements no nausea no shortness of breath she denies chest pain or palpitations no altered mental status.  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    PAST MEDICAL HISTORY   has a past medical history of Arthritis, Dental disorder, Glaucoma, Heart burn, Hypertension, Indigestion, Other specified disorder of intestines, Unspecified cataract, Unspecified disorder of thyroid, Unspecified essential hypertension (10/30/2014), and Unspecified hypothyroidism (10/30/2014).    SURGICAL HISTORY   has a past surgical history that includes other abdominal surgery (over 40 yrs ago); eye foreign body removal (5/20/2014); appendectomy (1996); lid tightening (8/27/2014); lumbar laminectomy diskectomy (Left, 7/17/2015); other (2-3yrs ago); other (2004); other (1952); eye foreign body removal (Left, 1/29/2016); blepharoplasty (Left, 9/7/2016); cervical disk and fusion anterior (N/A, 6/25/2018); and corpectomy (N/A, 6/25/2018).    FAMILY HISTORY  Family History   Problem Relation Age of Onset    Diabetes Mother     Cancer Father     Diabetes Father     Diabetes Brother        SOCIAL HISTORY  Social History     Tobacco Use    Smoking status: Never    Smokeless tobacco: Never  "  Vaping Use    Vaping Use: Never used   Substance and Sexual Activity    Alcohol use: No    Drug use: No    Sexual activity: Not on file       CURRENT MEDICATIONS  Home Medications    **Home medications have not yet been reviewed for this encounter**         ALLERGIES  No Known Allergies    PHYSICAL EXAM  VITAL SIGNS: BP (!) 234/94   Pulse 70   Temp 36.3 °C (97.4 °F) (Temporal)   Resp 16   Ht 1.473 m (4' 10\")   Wt 56.7 kg (125 lb)   SpO2 100%   BMI 26.13 kg/m²    Pulse ox interpretation: I interpret this pulse ox as normal.  Constitutional: Alert and oriented x 3, minimal Distress  HEENT: Left-sided facial droop that involves the forehead, left sided chronic severe cataract.  The right eye round and reactive to light.  Nares clear mouth shows moist mucous membranes  Neck: Supple, no JVD no tracheal deviation  Cardiovascular: Regular rate and rhythm no murmur rub or gallop 2+ pulses peripherally x4  Thorax & Lungs: No respiratory distress, no wheezes rales or rhonchi, No chest tenderness.   GI: Soft nontender nondistended positive bowel sounds, no peritoneal signs  Skin: Warm dry no acute rash or lesion  Musculoskeletal: Moving all extremities with full range and 5 of 5 strength no acute  deformity, 2+ pitting edema in the lower extremities to the midshin  Neurologic: Cranial nerves III through XII are grossly intact no sensory deficit no cerebellar dysfunction   Psychiatric: Appropriate affect for situation at this time    DIAGNOSTIC STUDIES / PROCEDURES  Results for orders placed or performed during the hospital encounter of 08/12/23   CBC w/ Differential   Result Value Ref Range    WBC 8.5 4.8 - 10.8 K/uL    RBC 4.24 4.20 - 5.40 M/uL    Hemoglobin 11.0 (L) 12.0 - 16.0 g/dL    Hematocrit 36.4 (L) 37.0 - 47.0 %    MCV 85.8 81.4 - 97.8 fL    MCH 25.9 (L) 27.0 - 33.0 pg    MCHC 30.2 (L) 32.2 - 35.5 g/dL    RDW 48.2 35.9 - 50.0 fL    Platelet Count 211 164 - 446 K/uL    MPV 11.4 9.0 - 12.9 fL    " Neutrophils-Polys 47.90 44.00 - 72.00 %    Lymphocytes 39.00 22.00 - 41.00 %    Monocytes 8.70 0.00 - 13.40 %    Eosinophils 3.50 0.00 - 6.90 %    Basophils 0.50 0.00 - 1.80 %    Immature Granulocytes 0.40 0.00 - 0.90 %    Nucleated RBC 0.00 0.00 - 0.20 /100 WBC    Neutrophils (Absolute) 4.06 1.82 - 7.42 K/uL    Lymphs (Absolute) 3.31 1.00 - 4.80 K/uL    Monos (Absolute) 0.74 0.00 - 0.85 K/uL    Eos (Absolute) 0.30 0.00 - 0.51 K/uL    Baso (Absolute) 0.04 0.00 - 0.12 K/uL    Immature Granulocytes (abs) 0.03 0.00 - 0.11 K/uL    NRBC (Absolute) 0.00 K/uL   Complete Metabolic Panel (CMP)   Result Value Ref Range    Sodium 137 135 - 145 mmol/L    Potassium 4.1 3.6 - 5.5 mmol/L    Chloride 102 96 - 112 mmol/L    Co2 21 20 - 33 mmol/L    Anion Gap 14.0 7.0 - 16.0    Glucose 115 (H) 65 - 99 mg/dL    Bun 44 (H) 8 - 22 mg/dL    Creatinine 1.36 0.50 - 1.40 mg/dL    Calcium 9.7 8.5 - 10.5 mg/dL    Correct Calcium 9.5 8.5 - 10.5 mg/dL    AST(SGOT) 23 12 - 45 U/L    ALT(SGPT) 14 2 - 50 U/L    Alkaline Phosphatase 87 30 - 99 U/L    Total Bilirubin 0.3 0.1 - 1.5 mg/dL    Albumin 4.3 3.2 - 4.9 g/dL    Total Protein 7.6 6.0 - 8.2 g/dL    Globulin 3.3 1.9 - 3.5 g/dL    A-G Ratio 1.3 g/dL   proBrain Natriuretic Peptide, NT   Result Value Ref Range    NT-proBNP 2198 (H) 0 - 125 pg/mL   Troponin - STAT Once   Result Value Ref Range    Troponin T 8 6 - 19 ng/L   ESTIMATED GFR   Result Value Ref Range    GFR (CKD-EPI) 39 (A) >60 mL/min/1.73 m 2   EKG   Result Value Ref Range    Report       [facility] Emergency Dept.    Test Date:  2023  Pt Name:    SARAY WYNN              Department: ER  MRN:        [id number]                      Room:        22  Gender:     Female                       Technician: [id number]  :        [id number]                   Requested By:JESE MORALES  Order #:    [id number]                    Reading MD: JESE MORALES MD    Measurements  Intervals                                 Axis  Rate:       65                           P:          51  IN:         153                          QRS:        40  QRSD:       97                           T:          47  QT:         379  QTc:        394    Interpretive Statements  Sinus rhythm  Compared to ECG 06/16/2023 21:53:41  Atrial premature complex(es) no longer present  Electronically Signed On 08- 23:24:48 PDT by JESE MORALES MD           RADIOLOGY  CT-HEAD W/O   Final Result      1.  Chronic microvascular ischemic type changes.   2.  Left suboccipital craniectomy with left cerebellar encephalomalacia again noted.   3.  No acute intracranial abnormality.                 COURSE & MEDICAL DECISION MAKING    ED Observation Status? Yes; I am placing the patient in to an observation status due to a diagnostic uncertainty as well as therapeutic intensity. Patient placed in observation status at 2256 p,M, 8/12/2023.     Observation plan is as follows: CT head hypertension control troponin EKG basic laboratory evaluation    Upon Reevaluation, the patient's condition has: Improved; and will be discharged.    Patient discharged from ED Observation status at 0310 (Time) 08/13/23    ASSESSMENT, COURSE AND PLAN  Care Narrative: Patient presented with headache extremely hypertensive.  She was given 1 dose of hydralazine and had drastic drop of her blood pressure however responded with some IV fluids.  Her pressure is now normal her troponin is negative very slight elevation of her BNP.  CT head shows no acute findings chronic encephalomalacia from previous surgical site.  Patient is feeling much better at this time headache is resolved no chest pain no shortness of breath her chest x-ray is clear.  Patient be given instructions to return should she develop further headache chest pain shortness of breath palpitations any other acute symptom change or concern otherwise to follow-up with her primary care doctor for chronic hypertensive  "management.    HTN/IDDM FOLLOW UP:  The patient has known hypertension and is being followed by their primary care doctor      ADDITIONAL PROBLEM LIST    DISPOSITION AND DISCUSSIONS  I have discussed management of the patient with the following physicians and RANJIT's:      Discussion of management with other QHP or appropriate source(s):      Escalation of care considered, and ultimately not performed:acute inpatient care management, however at this time, the patient is most appropriate for outpatient management    Barriers to care at this time, including but not limited to: .     Decision tools and prescription drugs considered including, but not limited to:  Considered additional antihypertensive however patient is on multi medication management currently and presentation in ER could be multifactorial follow-up with primary care for management of hypertension. .  /47   Pulse 65   Temp 36.4 °C (97.6 °F) (Temporal)   Resp 13   Ht 1.473 m (4' 10\")   Wt 56.7 kg (125 lb)   SpO2 100%   BMI 26.13 kg/m²     Herson Garcia M.D.  [address] [facility]   [address]  [place] [id number] [phone]    In 3 days      [facility], Emergency Dept  [address] [facility] [address] [phone]    in 12-24 hours if symptoms persist, immediately If symptoms worsen, or if you develop any other symptoms or concerns      FINAL DIAGNOSIS  1. Nonintractable headache, unspecified chronicity pattern, unspecified headache type Inactive   2. Hypertensive urgency Inactive                "

## 2023-08-13 NOTE — ED NOTES
Pt room air saturation noted at 87- 88%.   Offered 2 lpm via nasal cannula oxygen. Latest saturation went up to 95%

## 2023-08-13 NOTE — ED NOTES
Per SW needs to confirm her address and if she has her keys to make sure she can go home by cab.

## 2023-08-25 ENCOUNTER — APPOINTMENT (OUTPATIENT)
Dept: VASCULAR LAB | Facility: MEDICAL CENTER | Age: 83
End: 2023-08-25
Attending: FAMILY MEDICINE
Payer: MEDICARE

## 2023-08-25 ENCOUNTER — DOCUMENTATION (OUTPATIENT)
Dept: VASCULAR LAB | Facility: MEDICAL CENTER | Age: 83
End: 2023-08-25
Payer: MEDICARE

## 2023-08-25 ENCOUNTER — TELEPHONE (OUTPATIENT)
Dept: VASCULAR LAB | Facility: MEDICAL CENTER | Age: 83
End: 2023-08-25
Payer: MEDICARE

## 2023-08-25 NOTE — PROGRESS NOTES
Debra Stockton has been referred for evaluation and management in the vascular care clinic.     Unfortunately patient missed appointment for initial visit in our office today.  Apparently awaiting [employer] medical transport and unable to obtain ride to clinic     We will be unable to take part in care until or unless patient makes an appointment for a face-to-face visit in our center    We will ask our  or medical assistant to call and reschedule     Pending further patient contact, we will defer all vascular care, including management of cardiovascular risk factors, to PCP and other members of the care team    Vascular Medicine Clinic   [facility]   [phone]

## 2023-08-25 NOTE — TELEPHONE ENCOUNTER
Pt rescheduled with Dr. Snyder after unable to make appt on 08/25 due to transportation issues    Letter of appt reminder AND [employer] manuel time has been mailed to pt address on file.    Will be confirming [employer] ride and appt day off pt appt

## 2023-08-29 ENCOUNTER — TELEPHONE (OUTPATIENT)
Dept: HEALTH INFORMATION MANAGEMENT | Facility: OTHER | Age: 83
End: 2023-08-29
Payer: MEDICARE

## 2023-08-31 ENCOUNTER — OFFICE VISIT (OUTPATIENT)
Dept: VASCULAR LAB | Facility: MEDICAL CENTER | Age: 83
End: 2023-08-31
Attending: FAMILY MEDICINE
Payer: MEDICARE

## 2023-08-31 VITALS
SYSTOLIC BLOOD PRESSURE: 174 MMHG | DIASTOLIC BLOOD PRESSURE: 75 MMHG | HEART RATE: 81 BPM | BODY MASS INDEX: 26.87 KG/M2 | HEIGHT: 58 IN | WEIGHT: 128 LBS

## 2023-08-31 DIAGNOSIS — E03.8 OTHER SPECIFIED HYPOTHYROIDISM: ICD-10-CM

## 2023-08-31 DIAGNOSIS — N18.32 CKD STAGE G3B/A1, GFR 30-44 AND ALBUMIN CREATININE RATIO <30 MG/G: ICD-10-CM

## 2023-08-31 DIAGNOSIS — I87.2 CHRONIC VENOUS INSUFFICIENCY: ICD-10-CM

## 2023-08-31 DIAGNOSIS — I10 PRIMARY HYPERTENSION: ICD-10-CM

## 2023-08-31 DIAGNOSIS — I67.9 SMALL VESSEL DISEASE, CEREBROVASCULAR: ICD-10-CM

## 2023-08-31 DIAGNOSIS — F10.20 UNCOMPLICATED ALCOHOL DEPENDENCE (HCC): ICD-10-CM

## 2023-08-31 DIAGNOSIS — R73.03 PREDIABETES: ICD-10-CM

## 2023-08-31 DIAGNOSIS — D50.9 MICROCYTIC ANEMIA: ICD-10-CM

## 2023-08-31 PROCEDURE — 3078F DIAST BP <80 MM HG: CPT | Performed by: FAMILY MEDICINE

## 2023-08-31 PROCEDURE — 99204 OFFICE O/P NEW MOD 45 MIN: CPT | Performed by: FAMILY MEDICINE

## 2023-08-31 PROCEDURE — 99212 OFFICE O/P EST SF 10 MIN: CPT

## 2023-08-31 PROCEDURE — 3077F SYST BP >= 140 MM HG: CPT | Performed by: FAMILY MEDICINE

## 2023-08-31 RX ORDER — METOPROLOL SUCCINATE 25 MG/1
25 TABLET, EXTENDED RELEASE ORAL DAILY
Qty: 90 TABLET | Refills: 3 | Status: SHIPPED | OUTPATIENT
Start: 2023-08-31 | End: 2023-12-20

## 2023-08-31 RX ORDER — LEVOTHYROXINE SODIUM 0.1 MG/1
100 TABLET ORAL
COMMUNITY
Start: 2023-06-09

## 2023-08-31 RX ORDER — MELOXICAM 15 MG/1
15 TABLET ORAL
COMMUNITY
Start: 2023-06-18 | End: 2023-08-31

## 2023-08-31 RX ORDER — AMLODIPINE BESYLATE 5 MG/1
5 TABLET ORAL
Qty: 90 TABLET | Refills: 3 | Status: SHIPPED | OUTPATIENT
Start: 2023-08-31 | End: 2023-11-13

## 2023-08-31 RX ORDER — METOPROLOL SUCCINATE 50 MG/1
50 TABLET, EXTENDED RELEASE ORAL
COMMUNITY
Start: 2023-06-22 | End: 2023-08-31

## 2023-08-31 RX ORDER — OLMESARTAN MEDOXOMIL 20 MG/1
20 TABLET ORAL DAILY
Qty: 90 TABLET | Refills: 3 | Status: SHIPPED | OUTPATIENT
Start: 2023-08-31 | End: 2023-11-13

## 2023-08-31 RX ORDER — MINOXIDIL 2.5 MG/1
TABLET ORAL
COMMUNITY
Start: 2023-08-21 | End: 2023-08-31

## 2023-08-31 RX ORDER — CELECOXIB 200 MG/1
200 CAPSULE ORAL 2 TIMES DAILY
COMMUNITY
Start: 2023-08-13

## 2023-08-31 RX ORDER — NEOMYCIN SULFATE, POLYMYXIN B SULFATE, AND DEXAMETHASONE 3.5; 10000; 1 MG/G; [USP'U]/G; MG/G
OINTMENT OPHTHALMIC
COMMUNITY
Start: 2023-08-17

## 2023-08-31 RX ORDER — TIMOLOL MALEATE 5 MG/ML
SOLUTION/ DROPS OPHTHALMIC
COMMUNITY
Start: 2023-06-05

## 2023-08-31 RX ORDER — TIZANIDINE 2 MG/1
TABLET ORAL
COMMUNITY
Start: 2023-06-26

## 2023-08-31 RX ORDER — BRIMONIDINE TARTRATE 2 MG/ML
SOLUTION/ DROPS OPHTHALMIC
COMMUNITY
Start: 2023-06-13

## 2023-08-31 ASSESSMENT — ENCOUNTER SYMPTOMS
PND: 0
COUGH: 0
SPUTUM PRODUCTION: 0
FEVER: 0
CHILLS: 0
WHEEZING: 0
HEMOPTYSIS: 0
CLAUDICATION: 0
PALPITATIONS: 0
SHORTNESS OF BREATH: 0
ORTHOPNEA: 0

## 2023-08-31 ASSESSMENT — FIBROSIS 4 INDEX: FIB4 SCORE: 2.36

## 2023-08-31 NOTE — PROGRESS NOTES
[facility] - INITIAL EVALUATION   08/31/23      Debra Stockton is a female seen for eval/mgmt of HTN, est 8/2023  Debra Stockton is initially referred by Ref Not In Computer     Subjective      HTN:  Initial visit hx:  seen 6/2023 by PCP and has ongoing HTN despite current tx, most recent had metoprolol increased from 25 to 50mg ER daily with limited impact on BP   Home BP log: variable 110-160s/ 60-70s,  occ spike to 180s w/o sx   Adherence to current HTN meds: compliant all of the time   Lifestyle factors:  Weight Change: stable   Body mass index is 26.75 kg/m².   Wt Readings from Last 3 Encounters:   08/31/23 58.1 kg (128 lb)   08/12/23 56.7 kg (125 lb)   06/16/23 56.7 kg (125 lb)      Diet pattern: common adult  Daily salt intake estimate:  Low     EtOH: No  Exercise habits: no regular exercise program  Perceived barriers to care: age, walker, imbalance  Pertinent HTN hx (reviewed at initial visit):   Age at HTN dx: most of her life   (if female, any hx of pregnancy-related HTN): cannot recall   Past HTN medications: as noted   HTN crises:  recently in ED due to BP spike   Interfering substances/contributing factors:  NSAIDs/Koch-2i: Yes, Details: celebrex 200mg daily     Hyperlipidemia:  Stable, Current treatment: no current medications    Antiplatelet/anticoagulation: No, no bleeding noted     Type 2 DM: No     CKD: Yes, Details: G3b, no prior UACR      Sleeping disorder/JAMILA: No     Hypothyroidism: Yes, Details: stable TSH on levothyrox 100mcg      Patient Active Problem List    Diagnosis Date Noted    Chronic venous insufficiency 08/31/2023    CKD stage G3b/A1, GFR 30-44 and albumin creatinine ratio <30 mg/g (HCC) 08/31/2023    Microcytic anemia 08/31/2023    Small vessel disease, cerebrovascular 08/31/2023    COVID-19 06/16/2022    Alcohol dependence (HCC) 06/14/2022    Congestive heart failure due to hypertension (HCC) 06/14/2022    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     Bell's palsy 01/29/2016    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 01/29/2016    Lower eyelid retraction of left eye 01/29/2016    Paralytic lagophthalmos of left upper eyelid 01/29/2016    Thoracic or lumbosacral neuritis or radiculitis, unspecified 07/17/2015    Primary hypertension 10/30/2014    Hypothyroidism 10/30/2014    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    Eye abnormality 05/20/2014     Past Surgical History:   Procedure Laterality Date    CERVICAL DISK AND FUSION ANTERIOR N/A 6/25/2018    Procedure: CERVICAL DISK AND FUSION ANTERIOR/ C4-7;  Surgeon: Lester Doyle M.D.;  Location: SURGERY [facility];  Service: Neurosurgery    CORPECTOMY N/A 6/25/2018    Procedure: CORPECTOMY/ C5;  Surgeon: Lester Doyle M.D.;  Location: SURGERY [facility];  Service: Neurosurgery    BLEPHAROPLASTY Left 9/7/2016    Procedure:  PERMANENT TEMPORAL PARTIAL TARSORRHAPHY;  Surgeon: Alphonso Montoya M.D.;  Location: SURGERY [facility];  Service:     EYE FOREIGN BODY REMOVAL Left 1/29/2016    Procedure: EYE FOREIGN BODY REMOVAL  FOR: REMOVAL OF UPPER EYELID WEIGHT IMPLANT;  Surgeon: Alphonso Montoya M.D.;  Location: SURGERY SAME DAY [facility];  Service:     LUMBAR LAMINECTOMY DISKECTOMY Left 7/17/2015    Procedure: LUMBAR LAMINECTOMY DISKECTOMY POSTERIOR L5-S1 MICRO;  Surgeon: Abel Salazar M.D.;  Location: SURGERY [facility];  Service:     LID TIGHTENING  8/27/2014    Performed by Omer Servin M.D. at [facility]    EYE FOREIGN BODY REMOVAL  5/20/2014    Performed by Omer Servin M.D. at [facility]    OTHER  2004    left ear tumor removed    APPENDECTOMY  1996    OTHER  1952    tonsillectomy    OTHER  2-3yrs ago    l eye  with multiple surgeries    OTHER ABDOMINAL SURGERY  over 40 yrs ago    gavino wagner       Current Outpatient Medications:     olmesartan, 20 mg, Oral, DAILY    amLODIPine, 5 mg, Oral, QHS    metoprolol SR, 25 mg,  "Oral, DAILY    lidocaine, 1 Patch, Transdermal, Q24HRS, Taking    Loratadine, 10 mg, Oral, QDAY PRN, Taking    Combigan, INSTILL ONE DROP IN BOTH EYES TWICE DAILY, Taking    dorzolamide, Place  in right eye 2 Times a Day., Taking    Travatan Z, Place  in both eyes 2 Times a Day., Taking    meclizine, 25 mg, Oral, TID PRN, Taking    tizanidine, TAKE 1/2-1 TABLET BY MOUTH TWICE A DAY AS NEEDED MUSCLE SPASMS    timolol, INSTILL 1 DROP IN RIGHT EYE EVERY MORNING.    neomycin-polymixin-dexamethasone, APPLY A 1/2 INCH STRIP INTO RIGHT EYE AT BEDTIME    levothyroxine, 100 mcg, Oral, QDAY    celecoxib, 200 mg, Oral, BID    brimonidine, INSTILL ONE DROP INTO RIGHT EYE TWICE DAILY   Patient has no known allergies.    Family History   Problem Relation Age of Onset    Diabetes Mother     Cancer Father     Diabetes Father     Diabetes Brother      Social History     Tobacco Use    Smoking status: Never    Smokeless tobacco: Never   Vaping Use    Vaping Use: Never used   Substance Use Topics    Alcohol use: No    Drug use: No      Review of Systems   Constitutional:  Negative for chills, fever and malaise/fatigue.   Respiratory:  Negative for cough, hemoptysis, sputum production, shortness of breath and wheezing.    Cardiovascular:  Negative for chest pain, palpitations, orthopnea, claudication, leg swelling and PND.           Objective    Vitals:    08/31/23 1417 08/31/23 1425   BP: (!) 188/77 (!) 174/75   BP Location: Left arm Left arm   Patient Position: Sitting Sitting   BP Cuff Size: Adult Adult   Pulse: 84 81   Weight: 58.1 kg (128 lb)    Height: 1.473 m (4' 10\")      Body mass index is 26.75 kg/m².  BP Readings from Last 5 Encounters:   08/31/23 (!) 174/75   08/13/23 103/47   06/17/23 (!) 166/70   07/03/22 (!) 161/71   06/15/22 118/60      Physical Exam  Vitals reviewed.   Constitutional:       General: She is not in acute distress.     Appearance: Normal appearance.   HENT:      Head: Normocephalic and atraumatic.   Neck: " "     Thyroid: No thyroid mass.      Vascular: Normal carotid pulses.      Trachea: Trachea normal.   Cardiovascular:      Rate and Rhythm: Normal rate and regular rhythm.      Chest Wall: PMI is not displaced.      Pulses: Normal pulses.           Carotid pulses are 2+ on the right side and 2+ on the left side.       Radial pulses are 2+ on the right side and 2+ on the left side.        Dorsalis pedis pulses are 2+ on the right side and 2+ on the left side.        Posterior tibial pulses are 2+ on the right side and 2+ on the left side.      Heart sounds: Normal heart sounds.   Pulmonary:      Effort: Pulmonary effort is normal.      Breath sounds: Normal breath sounds.   Musculoskeletal:      Cervical back: Full passive range of motion without pain.      Right lower leg: No edema.      Left lower leg: No edema.   Skin:     General: Skin is warm and dry.      Capillary Refill: Capillary refill takes less than 2 seconds.      Coloration: Skin is not cyanotic.      Nails: There is no clubbing.   Neurological:      General: No focal deficit present.      Mental Status: She is alert and oriented to person, place, and time. Mental status is at baseline.      Cranial Nerves: No cranial nerve deficit.      Coordination: Coordination is intact. Coordination normal.      Gait: Gait is intact. Gait normal.   Psychiatric:         Mood and Affect: Mood normal.         Behavior: Behavior normal.        Accessory Clinical Findings:     Lab Results   Component Value Date    CHOLSTRLTOT 177 03/24/2023    LDL 92 03/24/2023    HDL 71 03/24/2023    TRIGLYCERIDE 72 03/24/2023      No results found for: \"LIPOPROTA\"   No results found for: \"APOB\"         Lab Results   Component Value Date    HBA1C 6.4 (H) 03/24/2023      Lab Results   Component Value Date    SODIUM 137 08/12/2023    POTASSIUM 4.1 08/12/2023    CHLORIDE 102 08/12/2023    CO2 21 08/12/2023    GLUCOSE 115 (H) 08/12/2023    BUN 44 (H) 08/12/2023    CREATININE 1.36 08/12/2023 " "            No results found for: \"MICROALBCALC\", \"MALBCRT\", \"MALBEXCR\", \"KAVQRW37\", \"MICROALBUR\", \"MICRALB\", \"UMICROALBUM\", \"MICROALBTIM\"   VASCULAR IMAGING:  Results for orders placed or performed during the hospital encounter of 23   EKG   Result Value Ref Range    Report       [facility] Emergency Dept.    Test Date:  2023  Pt Name:    SARAY WYNN              Department: ER  MRN:        [id number]                      Room:        22  Gender:     Female                       Technician: [id number]  :        [id number]                   Requested By:JESE MORALES  Order #:    [id number]                    Reading MD: JESE MORALES MD    Measurements  Intervals                                Axis  Rate:       65                           P:          51  NV:         153                          QRS:        40  QRSD:       97                           T:          47  QT:         379  QTc:        394    Interpretive Statements  Sinus rhythm  Compared to ECG 2023 21:53:41  Atrial premature complex(es) no longer present  Electronically Signed On 2023 23:24:48 PDT by JESE MORALES MD       VR duplex 2017   Complete color filling and compressibility with normal deep vein venous    flow dynamics including spontaneous flow, response to augmentation    maneuvers, and respiratory phasicity.     No deep vein reflux.   Greater than 2 seconds reflux in the greater saphenous vein from the    saphenofemoral junction to the knee.   No reflux in the short saphenous vein.   Reflux greater than 2 seconds in a varicose vein in the lateral calf.    MR brain   1.  Stable tiny T2 hypointense lesion in the left IAC likely representing a small residual schwannoma.  2.  Postsurgical changes in the left side of the posterior fossa.  3.  Mild cerebral volume loss.  4.  Mild chronic microvascular ischemic disease.  5.  There has been no significant interval change.    Echo " 6/2022  No prior study is available for comparison.   Normal left ventricular size, wall thickness and systolic function.  No regional wall motion abnormality noted.  No hemodynamically significant valvular heart disease noted.    CT head 8/2023  1.  Chronic microvascular ischemic type changes.  2.  Left suboccipital craniectomy with left cerebellar encephalomalacia again noted.  3.  No acute intracranial abnormality.          MEDICAL DECISION-MAKING:  TODAY'S ASSESSMENT / STATUS / PLAN:  1. Primary hypertension  olmesartan (BENICAR) 20 MG Tab    amLODIPine (NORVASC) 5 MG Tab    metoprolol SR (TOPROL XL) 25 MG TABLET SR 24 HR    Renal Function Panel    VITAMIN D,25 HYDROXY (DEFICIENCY)    PTH INTACT    HEMOGLOBIN A1C    MICROALBUMIN CREAT RATIO URINE    Referral to Vascular Medicine      2. Small vessel disease, cerebrovascular        3. Other specified hypothyroidism        4. Uncomplicated alcohol dependence (HCC)        5. Microcytic anemia        6. CKD stage G3b/A1, GFR 30-44 and albumin creatinine ratio <30 mg/g (HCC)  olmesartan (BENICAR) 20 MG Tab    Renal Function Panel    VITAMIN D,25 HYDROXY (DEFICIENCY)    PTH INTACT    HEMOGLOBIN A1C    MICROALBUMIN CREAT RATIO URINE      7. Chronic venous insufficiency        8. Prediabetes  Renal Function Panel    HEMOGLOBIN A1C    MICROALBUMIN CREAT RATIO URINE         Patient Type: Primary Prevention    1. BLOOD PRESSURE CONTROL   Qualifies as Resistant HTN (RH):  No, not on optimized, max-dosed or max-tolerated meds from 3 classes   Office BP Goal ACC/AHA (2017) goal <130/80, based upon age <140/80 reasonable   Home BP at goal:  no  Office BP at goal:  no  24h ABPM:  not ordered to date   RDN candidate? NO d/t age   Contributing factors: CKD, daily celebrex, wide pulse pressure presumed to be arteriosclerotic changes  - will need to avoid polypharmacy and avoid excessive BP lowering to reduce risks for falls, dizziness   Plan:   - visit with PCP to review if  celebrex can be changed to other safer option to reduce contributions to BP and CKD   Monitoring:   - start/continue home BP monitoring, reviewed correct technique:  Yes   - order 24h ABPM: UNDECIDED  - monitor lytes/gfr routinely   - advised that stabilizing BP may require multiple med changes and close monitoring over the next several months, reviewed that initially there will be additional imaging and labs and the possibility of adverse drug rxn's and variability of his BP and HR until we have things stabilized.  Advised to contact our office as needed for questions or concerns.      2. WORK UP FOR SECONDARY CAUSES OF RH:  Obstructive Sleep Apnea:  low prob, denies snoring   Renal parenchymal disease:  G3b, has recently worsened from G3a  - should f/u with PCP to review alternatives to celebrex   - for now would consider tylenol and topical diclofenac     Renovascular HTN: Not Yet Assessed  Primary Aldosteronism: Not Yet Assessed  Thyroid Function: Excluded, stable treated hypothyroidism   Pheochromocytoma:  low prob   phenotype   Cushing's:    low prob phenotype     Coarctation of Aorta: excluded  Other: none identified    Recommendations At This Visit: as noted in orders         3. MEDICATION USE / ADHERENCE:   Assessment: Complete  Recommendations: Instructed to Continue Taking All Medications As Prescribed         4. HTN-MEDIATED END-ORGAN DAMAGE:  Left Ventricular Hypertrophy: Absent on  Echocardiogram Date: 2022  Urine Albuminuria: Not Yet Assessed on Date: 2023   Renal Function: G3b  - avoid nephrotoxins  - continue HTN control with RAAS blockade   - monitor lytes/gfr routinely  - eval with nephrology if worsening over time  Ophthalmologic: Absent per patient report and/or eye specialist   Established Cardiovascular Disease:       # chronic small vessel CeVD - per MR, CT, presumed HTN-related, denies hx of CVA/TIA     5. LIFESTYLE INTERVENTIONS:    SMOKING:    reports that she has never smoked. She has  never used smokeless tobacco.   - continued complete avoidance of all tobacco products     PHYSICAL ACTIVITY: continue healthy activity to improve CV fitness.  In general, targeting >150min/week of moderate-level activity or as much as tolerated in light of functional status and co-morbidities     WEIGHT MANAGEMENT AND NUTRITION: DASH style dietary approach  and Focus on reduced sodium to <2,000mg per day       6. STANDARD HTN PHARMACOTHERAPY:  - stop lisinopril/hctz, start olmesartan 20mg daily for improved 24h coverage and potency  - start amlodipine 5mg daily - best option to reduce variability     7. ADDITIONAL AGENTS   - reduce metoprolol to 25mg ER daily - will likely stop at next visit   - stop minoxidil 2.5mg - unnecessary and hard to use medication     LIPID MANAGEMENT:   Qualifies for Statin Therapy Based on 2018 ACC/AHA Guidelines: N\A, no guidelines for >74yo  The ASCVD Risk score (Toni DK, et al., 2019) failed to calculate., N/A  Major ASCVD events: None    High-risk conditions: N/A  Risk-enhancers: N/A  Currently on Statin: No  Tx goals: LDL-C <100 (if HTG, consider apoB <90, non-HDL-C <130)  At goal? yes  Plan:   - reinforced ongoing TLC measures as noted   - monitor labs   Meds: none     GLYCEMIA MANAGEMENT:  Normal    ANTIPLATELET/ANTICOAGULANT THERAPY:  none     OTHER ISSUES:     # hypothyroidism, stable  Tolerating meds, TSH normal   - continue current treatment plan, defer mgmt to PCP      Studies Ordered At This Visit: none    Blood Work to Be Obtained Prior to Next Visit: as noted below   Disposition: RTC in 2 months      Valerio Snyder M.D.  [facility]   [facility]   [phone]

## 2023-10-04 ENCOUNTER — OFFICE VISIT (OUTPATIENT)
Dept: URGENT CARE | Facility: CLINIC | Age: 83
End: 2023-10-04
Payer: MEDICARE

## 2023-10-04 ENCOUNTER — APPOINTMENT (OUTPATIENT)
Dept: RADIOLOGY | Facility: IMAGING CENTER | Age: 83
End: 2023-10-04
Attending: PHYSICIAN ASSISTANT
Payer: MEDICARE

## 2023-10-04 VITALS
WEIGHT: 125 LBS | TEMPERATURE: 98.6 F | HEART RATE: 103 BPM | SYSTOLIC BLOOD PRESSURE: 130 MMHG | HEIGHT: 58 IN | DIASTOLIC BLOOD PRESSURE: 72 MMHG | OXYGEN SATURATION: 93 % | BODY MASS INDEX: 26.24 KG/M2

## 2023-10-04 DIAGNOSIS — U07.1 COVID-19: ICD-10-CM

## 2023-10-04 DIAGNOSIS — R05.1 ACUTE COUGH: ICD-10-CM

## 2023-10-04 LAB
FLUAV RNA SPEC QL NAA+PROBE: NEGATIVE
FLUBV RNA SPEC QL NAA+PROBE: NEGATIVE
RSV RNA SPEC QL NAA+PROBE: NEGATIVE
SARS-COV-2 RNA RESP QL NAA+PROBE: POSITIVE

## 2023-10-04 PROCEDURE — 71046 X-RAY EXAM CHEST 2 VIEWS: CPT | Mod: TC | Performed by: PHYSICIAN ASSISTANT

## 2023-10-04 PROCEDURE — 3078F DIAST BP <80 MM HG: CPT | Performed by: PHYSICIAN ASSISTANT

## 2023-10-04 PROCEDURE — 99214 OFFICE O/P EST MOD 30 MIN: CPT | Performed by: PHYSICIAN ASSISTANT

## 2023-10-04 PROCEDURE — 0241U POCT CEPHEID COV-2, FLU A/B, RSV - PCR: CPT | Performed by: PHYSICIAN ASSISTANT

## 2023-10-04 PROCEDURE — 3075F SYST BP GE 130 - 139MM HG: CPT | Performed by: PHYSICIAN ASSISTANT

## 2023-10-04 RX ORDER — BENZONATATE 200 MG/1
200 CAPSULE ORAL 3 TIMES DAILY PRN
Qty: 60 CAPSULE | Refills: 0 | Status: SHIPPED | OUTPATIENT
Start: 2023-10-04

## 2023-10-04 ASSESSMENT — ENCOUNTER SYMPTOMS
SORE THROAT: 1
HEMOPTYSIS: 0
WHEEZING: 0
COUGH: 1
HEADACHES: 1
CHILLS: 1
FEVER: 1
MYALGIAS: 1
SHORTNESS OF BREATH: 0
RHINORRHEA: 1

## 2023-10-04 ASSESSMENT — FIBROSIS 4 INDEX: FIB4 SCORE: 2.36

## 2023-10-05 NOTE — PROGRESS NOTES
Subjective:   Debra Stockton is a 83 y.o. female who presents for Fever (X 3 days, fever, nausea, headache, sore throat, lost of appetite)        Patient states that she is currently taking metoprolol, amlodipine and levothyroxine.  She states that she is not taking Zanaflex and Benicar.      Cough  This is a new problem. Episode onset: 3 days. The problem has been gradually improving. The problem occurs every few minutes. The cough is Productive of sputum. Associated symptoms include chills, a fever, headaches, myalgias, rhinorrhea and a sore throat. Pertinent negatives include no chest pain, ear congestion, hemoptysis, nasal congestion, shortness of breath or wheezing. Associated symptoms comments: Decreased appetite, body aches. She has tried rest (tylenol) for the symptoms. There is no history of bronchitis or pneumonia.     Review of Systems   Constitutional:  Positive for chills and fever.   HENT:  Positive for rhinorrhea and sore throat.    Respiratory:  Positive for cough. Negative for hemoptysis, shortness of breath and wheezing.    Cardiovascular:  Negative for chest pain.   Musculoskeletal:  Positive for myalgias.   Neurological:  Positive for headaches.       PMH:  has a past medical history of Arthritis, Dental disorder, Glaucoma, Heart burn, Hypertension, Indigestion, Other specified disorder of intestines, Unspecified cataract, Unspecified disorder of thyroid, Unspecified essential hypertension (10/30/2014), and Unspecified hypothyroidism (10/30/2014).  MEDS:   Current Outpatient Medications:     Nirmatrelvir&Ritonavir 150/100 10 x 150 MG & 10 x 100MG Tablet Therapy Pack, Take 150 mg nirmatrelvir (one 150 mg tablet) with 100 mg ritonavir (one 100 mg tablet) by mouth, with both tablets taken together twice daily for 5 days., Disp: 20 Each, Rfl: 0    benzonatate (TESSALON) 200 MG capsule, Take 1 Capsule by mouth 3 times a day as needed for Cough., Disp: 60 Capsule, Rfl: 0    levothyroxine (SYNTHROID)  100 MCG Tab, Take 100 mcg by mouth every day., Disp: , Rfl:     olmesartan (BENICAR) 20 MG Tab, Take 1 Tablet by mouth every day., Disp: 90 Tablet, Rfl: 3    amLODIPine (NORVASC) 5 MG Tab, Take 1 Tablet by mouth at bedtime., Disp: 90 Tablet, Rfl: 3    metoprolol SR (TOPROL XL) 25 MG TABLET SR 24 HR, Take 1 Tablet by mouth every day for 360 days., Disp: 90 Tablet, Rfl: 3    Loratadine (CLARITIN) 10 MG Cap, Take 10 mg by mouth 1 time a day as needed., Disp: 30 capsule, Rfl: 0    meclizine (ANTIVERT) 25 MG Tab, Take 1 Tab by mouth 3 times a day as needed., Disp: 90 Tab, Rfl: 3    tizanidine (ZANAFLEX) 2 MG tablet, TAKE 1/2-1 TABLET BY MOUTH TWICE A DAY AS NEEDED MUSCLE SPASMS, Disp: , Rfl:     timolol (TIMOPTIC) 0.5 % Solution, INSTILL 1 DROP IN RIGHT EYE EVERY MORNING., Disp: , Rfl:     neomycin-polymixin-dexamethasone (MAXITROL) 3.5-75749-8.1 Ointment ophthalmic ointment, APPLY A 1/2 INCH STRIP INTO RIGHT EYE AT BEDTIME, Disp: , Rfl:     celecoxib (CELEBREX) 200 MG Cap, Take 200 mg by mouth 2 times a day., Disp: , Rfl:     brimonidine (ALPHAGAN) 0.2 % Solution, INSTILL ONE DROP INTO RIGHT EYE TWICE DAILY, Disp: , Rfl:     lidocaine (LIDODERM) 5 % Patch, Place 1 Patch on the skin every 24 hours., Disp: 10 Patch, Rfl: 0    COMBIGAN 0.2-0.5 % Solution, INSTILL ONE DROP IN BOTH EYES TWICE DAILY, Disp: , Rfl: 4    dorzolamide (TRUSOPT) 2 % Solution, Place  in right eye 2 Times a Day., Disp: , Rfl:     TRAVATAN Z 0.004 % Solution, Place  in both eyes 2 Times a Day., Disp: , Rfl:   ALLERGIES: No Known Allergies  SURGHX:   Past Surgical History:   Procedure Laterality Date    CERVICAL DISK AND FUSION ANTERIOR N/A 6/25/2018    Procedure: CERVICAL DISK AND FUSION ANTERIOR/ C4-7;  Surgeon: Lester Doyle M.D.;  Location: SURGERY [facility];  Service: Neurosurgery    CORPECTOMY N/A 6/25/2018    Procedure: CORPECTOMY/ C5;  Surgeon: Lester Doyle M.D.;  Location: SURGERY [facility];  Service: Neurosurgery  "   BLEPHAROPLASTY Left 9/7/2016    Procedure:  PERMANENT TEMPORAL PARTIAL TARSORRHAPHY;  Surgeon: Alphonso Montoya M.D.;  Location: SURGERY [facility];  Service:     EYE FOREIGN BODY REMOVAL Left 1/29/2016    Procedure: EYE FOREIGN BODY REMOVAL  FOR: REMOVAL OF UPPER EYELID WEIGHT IMPLANT;  Surgeon: Alphonso Montoya M.D.;  Location: SURGERY SAME DAY [facility] ORS;  Service:     LUMBAR LAMINECTOMY DISKECTOMY Left 7/17/2015    Procedure: LUMBAR LAMINECTOMY DISKECTOMY POSTERIOR L5-S1 MICRO;  Surgeon: Abel Salazar M.D.;  Location: SURGERY [facility];  Service:     LID TIGHTENING  8/27/2014    Performed by Omer Servin M.D. at [facility]    EYE FOREIGN BODY REMOVAL  5/20/2014    Performed by Omer Servin M.D. at [facility]    OTHER  2004    left ear tumor removed    APPENDECTOMY  1996    OTHER  1952    tonsillectomy    OTHER  2-3yrs ago    l eye  with multiple surgeries    OTHER ABDOMINAL SURGERY  over 40 yrs ago    gavino wagner     SOCHX:  reports that she has never smoked. She has never used smokeless tobacco. She reports that she does not drink alcohol and does not use drugs.  FH: Family history was reviewed, no pertinent findings to report   Objective:   /72 (BP Location: Right arm, Patient Position: Sitting, BP Cuff Size: Adult)   Pulse (!) 103   Temp 37 °C (98.6 °F)   Ht 1.473 m (4' 10\")   Wt 56.7 kg (125 lb)   SpO2 93%   BMI 26.13 kg/m²   Physical Exam  Vitals reviewed.   Constitutional:       General: She is not in acute distress.     Appearance: Normal appearance. She is well-developed. She is not toxic-appearing.   HENT:      Head: Normocephalic and atraumatic.      Right Ear: External ear normal.      Left Ear: External ear normal.      Nose: Congestion and rhinorrhea present. Rhinorrhea is clear.      Mouth/Throat:      Lips: Pink.      Mouth: Mucous membranes are moist.      Pharynx: Oropharynx is clear. Uvula midline. Posterior oropharyngeal " erythema present.   Cardiovascular:      Rate and Rhythm: Regular rhythm. Tachycardia present.   Pulmonary:      Effort: Pulmonary effort is normal. No respiratory distress.      Breath sounds: Normal breath sounds. No stridor. No decreased breath sounds, wheezing, rhonchi or rales.   Skin:     General: Skin is dry.   Neurological:      Comments: Alert and oriented.    Psychiatric:         Speech: Speech normal.         Behavior: Behavior normal.             Imaging:  FINDINGS:     No pulmonary infiltrates or consolidations are noted.  No pleural effusions, no pneumothorax are appreciated.  Normal cardiopericardial silhouette.        IMPRESSION:        1. No active cardiopulmonary abnormalities are identified.  Assessment/Plan:   1. COVID-19  - Nirmatrelvir&Ritonavir 150/100 10 x 150 MG & 10 x 100MG Tablet Therapy Pack; Take 150 mg nirmatrelvir (one 150 mg tablet) with 100 mg ritonavir (one 100 mg tablet) by mouth, with both tablets taken together twice daily for 5 days.  Dispense: 20 Each; Refill: 0    2. Acute cough  - POCT CoV-2, Flu A/B, RSV by PCR  - DX-CHEST-2 VIEWS; Future  - benzonatate (TESSALON) 200 MG capsule; Take 1 Capsule by mouth 3 times a day as needed for Cough.  Dispense: 60 Capsule; Refill: 0    Positive for Covid 19. Pt has good understanding of etiology and disease course. Quarantine [place] CDC guidelines- guidelines reviewed.    Pt qualifies for treatment with Paxlovid. Paxlovid risks, benefits, side effects reviewed. Medications reviewed for potential interactions.  Patient instructed to bring all current medications to the pharmacy when she picks up the medication so that she can review with pharmacist for potential interactions.  Medication renal dose adjusted.  Patient advised that she may not drink alcohol with the medication.    Patient may also continue symptomatic care with 12-hour Mucinex and antitussives as needed.  Recommend reevaluation with any new or worsening symptoms.  If patient  develops severe symptoms such as shortness of breath, chest pain, difficulty breathing, pain with breathing or other severe and concerning symptoms-to ED for reevaluation.

## 2023-11-06 ENCOUNTER — HOSPITAL ENCOUNTER (OUTPATIENT)
Dept: LAB | Facility: MEDICAL CENTER | Age: 83
End: 2023-11-06
Attending: FAMILY MEDICINE
Payer: MEDICARE

## 2023-11-06 DIAGNOSIS — N18.32 CKD STAGE G3B/A1, GFR 30-44 AND ALBUMIN CREATININE RATIO <30 MG/G: ICD-10-CM

## 2023-11-06 DIAGNOSIS — I10 PRIMARY HYPERTENSION: ICD-10-CM

## 2023-11-06 DIAGNOSIS — R73.03 PREDIABETES: ICD-10-CM

## 2023-11-06 LAB
25(OH)D3 SERPL-MCNC: 35 NG/ML (ref 30–100)
ALBUMIN SERPL BCP-MCNC: 4.7 G/DL (ref 3.2–4.9)
BUN SERPL-MCNC: 18 MG/DL (ref 8–22)
CALCIUM ALBUM COR SERPL-MCNC: 9.8 MG/DL (ref 8.5–10.5)
CALCIUM SERPL-MCNC: 10.4 MG/DL (ref 8.5–10.5)
CHLORIDE SERPL-SCNC: 100 MMOL/L (ref 96–112)
CO2 SERPL-SCNC: 23 MMOL/L (ref 20–33)
CREAT SERPL-MCNC: 0.88 MG/DL (ref 0.5–1.4)
CREAT UR-MCNC: 83.04 MG/DL
GFR SERPLBLD CREATININE-BSD FMLA CKD-EPI: 66 ML/MIN/1.73 M 2
GLUCOSE SERPL-MCNC: 96 MG/DL (ref 65–99)
MICROALBUMIN UR-MCNC: <1.2 MG/DL
MICROALBUMIN/CREAT UR: NORMAL MG/G (ref 0–30)
PHOSPHATE SERPL-MCNC: 3.3 MG/DL (ref 2.5–4.5)
POTASSIUM SERPL-SCNC: 4 MMOL/L (ref 3.6–5.5)
PTH-INTACT SERPL-MCNC: 52.4 PG/ML (ref 14–72)
SODIUM SERPL-SCNC: 135 MMOL/L (ref 135–145)

## 2023-11-06 PROCEDURE — 83036 HEMOGLOBIN GLYCOSYLATED A1C: CPT

## 2023-11-06 PROCEDURE — 82306 VITAMIN D 25 HYDROXY: CPT

## 2023-11-06 PROCEDURE — 36415 COLL VENOUS BLD VENIPUNCTURE: CPT

## 2023-11-06 PROCEDURE — 80069 RENAL FUNCTION PANEL: CPT

## 2023-11-06 PROCEDURE — 83970 ASSAY OF PARATHORMONE: CPT

## 2023-11-06 PROCEDURE — 82570 ASSAY OF URINE CREATININE: CPT

## 2023-11-06 PROCEDURE — 82043 UR ALBUMIN QUANTITATIVE: CPT

## 2023-11-13 ENCOUNTER — OFFICE VISIT (OUTPATIENT)
Dept: VASCULAR LAB | Facility: MEDICAL CENTER | Age: 83
End: 2023-11-13
Attending: FAMILY MEDICINE
Payer: MEDICARE

## 2023-11-13 ENCOUNTER — DOCUMENTATION (OUTPATIENT)
Dept: VASCULAR LAB | Facility: MEDICAL CENTER | Age: 83
End: 2023-11-13
Payer: MEDICARE

## 2023-11-13 VITALS
DIASTOLIC BLOOD PRESSURE: 72 MMHG | HEIGHT: 58 IN | WEIGHT: 128 LBS | BODY MASS INDEX: 26.87 KG/M2 | HEART RATE: 80 BPM | SYSTOLIC BLOOD PRESSURE: 138 MMHG

## 2023-11-13 DIAGNOSIS — D50.9 MICROCYTIC ANEMIA: ICD-10-CM

## 2023-11-13 DIAGNOSIS — I10 PRIMARY HYPERTENSION: ICD-10-CM

## 2023-11-13 DIAGNOSIS — R73.03 PREDIABETES: ICD-10-CM

## 2023-11-13 DIAGNOSIS — I67.9 SMALL VESSEL DISEASE, CEREBROVASCULAR: ICD-10-CM

## 2023-11-13 DIAGNOSIS — E03.8 OTHER SPECIFIED HYPOTHYROIDISM: ICD-10-CM

## 2023-11-13 DIAGNOSIS — N18.32 CKD STAGE G3B/A1, GFR 30-44 AND ALBUMIN CREATININE RATIO <30 MG/G: ICD-10-CM

## 2023-11-13 PROCEDURE — 3078F DIAST BP <80 MM HG: CPT | Performed by: FAMILY MEDICINE

## 2023-11-13 PROCEDURE — 99214 OFFICE O/P EST MOD 30 MIN: CPT | Performed by: FAMILY MEDICINE

## 2023-11-13 PROCEDURE — 99212 OFFICE O/P EST SF 10 MIN: CPT

## 2023-11-13 PROCEDURE — 3075F SYST BP GE 130 - 139MM HG: CPT | Performed by: FAMILY MEDICINE

## 2023-11-13 RX ORDER — OLMESARTAN MEDOXOMIL 40 MG/1
40 TABLET ORAL DAILY
Qty: 90 TABLET | Refills: 3 | Status: SHIPPED | OUTPATIENT
Start: 2023-11-13 | End: 2023-12-29 | Stop reason: SDUPTHER

## 2023-11-13 RX ORDER — AMLODIPINE BESYLATE 2.5 MG/1
2.5 TABLET ORAL
Qty: 90 TABLET | Refills: 3 | Status: SHIPPED | OUTPATIENT
Start: 2023-11-13 | End: 2023-11-29

## 2023-11-13 ASSESSMENT — ENCOUNTER SYMPTOMS
COUGH: 0
PND: 0
FEVER: 0
CLAUDICATION: 0
SHORTNESS OF BREATH: 0
CHILLS: 0
SPUTUM PRODUCTION: 0
PALPITATIONS: 0
WHEEZING: 0
ORTHOPNEA: 0
HEMOPTYSIS: 0

## 2023-11-13 ASSESSMENT — FIBROSIS 4 INDEX: FIB4 SCORE: 2.36

## 2023-11-13 NOTE — PROGRESS NOTES
[facility]N [facility] - FOLLOW-UP   11/13/23      Debra Stockton is a female seen for eval/mgmt of HTN, est 8/2023  Debra Stockton is initially referred by n/a    Subjective      Interval hx/concerns: last seen 8/2023, feeling well, having some swelling       HTN:  Home BP log: variable 120s/70s most days, no spiking    Adherence to current HTN meds: compliant all of the time  Initial visit hx:  seen 6/2023 by PCP and has ongoing HTN despite current tx, most recent had metoprolol increased from 25 to 50mg ER daily with limited impact on BP    Lifestyle factors:  Weight Change: stable   Body mass index is 26.75 kg/m².   Wt Readings from Last 3 Encounters:   11/13/23 58.1 kg (128 lb)   10/04/23 56.7 kg (125 lb)   08/31/23 58.1 kg (128 lb)      Diet pattern: common adult  Daily salt intake estimate:  Low     EtOH: No  Exercise habits: no regular exercise program  Perceived barriers to care: age, walker, imbalance  Pertinent HTN hx (reviewed at initial visit):   Age at HTN dx: most of her life   (if female, any hx of pregnancy-related HTN): cannot recall   Past HTN medications: as noted   HTN crises:  recently in ED due to BP spike   Interfering substances/contributing factors:  NSAIDs/Koch-2i: Yes, Details: celebrex 200mg daily     Hyperlipidemia:Stable, Current treatment: no current medications    Antiplatelet/anticoagulation: No, no bleeding noted      CKD: Yes, Details: G3b, no prior UACR        Current Outpatient Medications:     amLODIPine, 2.5 mg, Oral, QHS    olmesartan, 40 mg, Oral, DAILY    Nirmatrelvir&Ritonavir 150/100, Take 150 mg nirmatrelvir (one 150 mg tablet) with 100 mg ritonavir (one 100 mg tablet) by mouth, with both tablets taken together twice daily for 5 days., Taking    benzonatate, 200 mg, Oral, TID PRN, Taking    tizanidine, TAKE 1/2-1 TABLET BY MOUTH TWICE A DAY AS NEEDED MUSCLE SPASMS, Taking    timolol, INSTILL 1 DROP IN RIGHT EYE EVERY MORNING., Taking     "neomycin-polymixin-dexamethasone, APPLY A 1/2 INCH STRIP INTO RIGHT EYE AT BEDTIME, Taking    levothyroxine, 100 mcg, Oral, QDAY, Taking    celecoxib, 200 mg, Oral, BID, Taking    brimonidine, INSTILL ONE DROP INTO RIGHT EYE TWICE DAILY, Taking    metoprolol SR, 25 mg, Oral, DAILY, Taking    lidocaine, 1 Patch, Transdermal, Q24HRS, Taking    Loratadine, 10 mg, Oral, QDAY PRN, Taking    Combigan, INSTILL ONE DROP IN BOTH EYES TWICE DAILY, Taking    dorzolamide, Place  in right eye 2 Times a Day., Taking    Travatan Z, Place  in both eyes 2 Times a Day., Taking    meclizine, 25 mg, Oral, TID PRN, Taking   Patient has no known allergies.    Social History     Tobacco Use    Smoking status: Never    Smokeless tobacco: Never   Vaping Use    Vaping Use: Never used   Substance Use Topics    Alcohol use: No    Drug use: No      Review of Systems   Constitutional:  Negative for chills, fever and malaise/fatigue.   Respiratory:  Negative for cough, hemoptysis, sputum production, shortness of breath and wheezing.    Cardiovascular:  Negative for chest pain, palpitations, orthopnea, claudication, leg swelling and PND.           Objective    Vitals:    11/13/23 1552 11/13/23 1557   BP: (!) 146/71 138/72   BP Location: Left arm Left arm   Patient Position: Sitting Sitting   BP Cuff Size: Adult Adult   Pulse: 82 80   Weight: 58.1 kg (128 lb)    Height: 1.473 m (4' 10\")        Body mass index is 26.75 kg/m².  BP Readings from Last 5 Encounters:   11/13/23 138/72   10/04/23 130/72   08/31/23 (!) 174/75   08/13/23 103/47   06/17/23 (!) 166/70      Physical Exam  Vitals reviewed.   Constitutional:       General: She is not in acute distress.     Appearance: Normal appearance.   HENT:      Head: Normocephalic and atraumatic.   Neck:      Thyroid: No thyroid mass.      Vascular: Normal carotid pulses.      Trachea: Trachea normal.   Cardiovascular:      Rate and Rhythm: Normal rate and regular rhythm.      Chest Wall: PMI is not " "displaced.      Pulses: Normal pulses.           Carotid pulses are 2+ on the right side and 2+ on the left side.       Radial pulses are 2+ on the right side and 2+ on the left side.        Dorsalis pedis pulses are 2+ on the right side and 2+ on the left side.        Posterior tibial pulses are 2+ on the right side and 2+ on the left side.      Heart sounds: Normal heart sounds.   Pulmonary:      Effort: Pulmonary effort is normal.      Breath sounds: Normal breath sounds.   Musculoskeletal:      Cervical back: Full passive range of motion without pain.      Right lower leg: No edema.      Left lower leg: No edema.   Skin:     General: Skin is warm and dry.      Capillary Refill: Capillary refill takes less than 2 seconds.      Coloration: Skin is not cyanotic.      Nails: There is no clubbing.   Neurological:      General: No focal deficit present.      Mental Status: She is alert and oriented to person, place, and time. Mental status is at baseline.      Cranial Nerves: No cranial nerve deficit.      Coordination: Coordination is intact. Coordination normal.      Gait: Gait is intact. Gait normal.   Psychiatric:         Mood and Affect: Mood normal.         Behavior: Behavior normal.        Accessory Clinical Findings:     Lab Results   Component Value Date    CHOLSTRLTOT 177 03/24/2023    LDL 92 03/24/2023    HDL 71 03/24/2023    TRIGLYCERIDE 72 03/24/2023      No results found for: \"LIPOPROTA\"   No results found for: \"APOB\"         Lab Results   Component Value Date    HBA1C 6.4 (H) 03/24/2023      Lab Results   Component Value Date    SODIUM 135 11/06/2023    POTASSIUM 4.0 11/06/2023    CHLORIDE 100 11/06/2023    CO2 23 11/06/2023    GLUCOSE 96 11/06/2023    BUN 18 11/06/2023    CREATININE 0.88 11/06/2023          Lab Results   Component Value Date    URCREAT 83.04 11/06/2023    MICROALBUR <1.2 11/06/2023    MALBCRT see below 11/06/2023     Lab Results   Component Value Date/Time    MALBCRT see below " 11/06/2023 09:14 AM    MICROALBUR <1.2 11/06/2023 09:14 AM      VASCULAR IMAGING:    VR duplex 2017   Complete color filling and compressibility with normal deep vein venous    flow dynamics including spontaneous flow, response to augmentation    maneuvers, and respiratory phasicity.     No deep vein reflux.   Greater than 2 seconds reflux in the greater saphenous vein from the    saphenofemoral junction to the knee.   No reflux in the short saphenous vein.   Reflux greater than 2 seconds in a varicose vein in the lateral calf.    MR brain 2022  1.  Stable tiny T2 hypointense lesion in the left IAC likely representing a small residual schwannoma.  2.  Postsurgical changes in the left side of the posterior fossa.  3.  Mild cerebral volume loss.  4.  Mild chronic microvascular ischemic disease.  5.  There has been no significant interval change.    Echo 6/2022  No prior study is available for comparison.   Normal left ventricular size, wall thickness and systolic function.  No regional wall motion abnormality noted.  No hemodynamically significant valvular heart disease noted.    CT head 8/2023  1.  Chronic microvascular ischemic type changes.  2.  Left suboccipital craniectomy with left cerebellar encephalomalacia again noted.  3.  No acute intracranial abnormality.                MEDICAL DECISION-MAKING:  TODAY'S ASSESSMENT / STATUS / PLAN:  1. Primary hypertension  amLODIPine (NORVASC) 2.5 MG Tab    olmesartan (BENICAR) 40 MG Tab      2. Small vessel disease, cerebrovascular        3. Other specified hypothyroidism        4. Microcytic anemia        5. Prediabetes        6. CKD stage G3b/A1, GFR 30-44 and albumin creatinine ratio <30 mg/g ([facility])           Patient Type: Primary Prevention    1. BLOOD PRESSURE CONTROL   Qualifies as Resistant HTN (RH):  No, not on optimized, max-dosed or max-tolerated meds from 3 classes   Office BP Goal ACC/AHA (2017) goal <130/80, based upon age <140/80 reasonable   Home BP at goal:   no  Office BP at goal:  improving, WCE noted   24h ABPM:  not ordered to date   Contributing factors: CKD, daily celebrex, wide pulse pressure presumed to be arteriosclerotic changes  - will need to avoid polypharmacy and avoid excessive BP lowering to reduce risks for falls, dizziness   Plan:   - visit with PCP to review if celebrex can be changed to other safer option to reduce contributions to BP and CKD   Monitoring:   - start/continue home BP monitoring, reviewed correct technique:  Yes   - order 24h ABPM: UNDECIDED  - monitor lytes/gfr routinely     2. WORK UP FOR SECONDARY CAUSES OF RH:  Obstructive Sleep Apnea:  low prob, denies snoring   Renal parenchymal disease:  G3b, has recently worsened from G3a  - should f/u with PCP to review alternatives to celebrex   - for now would consider tylenol and topical diclofenac     Renovascular HTN: Not Yet Assessed  Primary Aldosteronism: Not Yet Assessed  Thyroid Function: Excluded, stable treated hypothyroidism   Pheochromocytoma:  low prob   phenotype   Cushing's:    low prob phenotype     Coarctation of Aorta: excluded  Other: none identified    Recommendations At This Visit: as noted in orders         3. MEDICATION USE / ADHERENCE:   Assessment: Complete  Recommendations: Instructed to Continue Taking All Medications As Prescribed         4. HTN-MEDIATED END-ORGAN DAMAGE:  Left Ventricular Hypertrophy: Absent on  Echocardiogram Date: 2022  Urine Albuminuria: None on Date: 2023   Renal Function: G2, improved from G3b  Ophthalmologic: Absent per patient report and/or eye specialist   Established Cardiovascular Disease:     # chronic small vessel CeVD - per MR, CT, presumed HTN-related, denies hx of CVA/TIA     5. LIFESTYLE INTERVENTIONS:    SMOKING:    reports that she has never smoked. She has never used smokeless tobacco.   - continued complete avoidance of all tobacco products     PHYSICAL ACTIVITY: continue healthy activity to improve CV fitness.  In general,  targeting >150min/week of moderate-level activity or as much as tolerated in light of functional status and co-morbidities     WEIGHT MANAGEMENT AND NUTRITION: DASH style dietary approach  and Focus on reduced sodium to <2,000mg per day      6. STANDARD HTN PHARMACOTHERAPY:  - increase olmesartan to 40mg daily , prior stopped lisinopril/hctz   - reduce amlodipine to 2.5mg daily - due to edema and monitor, best BP med for variability    7. ADDITIONAL AGENTS   - stop metoprolol to 25mg ER daily   - stopped minoxidil 2.5mg - unnecessary and hard to use medication     LIPID MANAGEMENT:   Qualifies for Statin Therapy Based on 2018 ACC/AHA Guidelines: N\A, no guidelines for >74yo  The ASCVD Risk score (Toni DK, et al., 2019) failed to calculate., N/A  Major ASCVD events: None    High-risk conditions: N/A  Risk-enhancers: N/A  Currently on Statin: No  Tx goals: LDL-C <100 (if HTG, consider apoB <90, non-HDL-C <130)  At goal? yes  Plan:   - reinforced ongoing TLC measures as noted   - monitor labs   Meds: none     GLYCEMIA MANAGEMENT:  Normal    ANTIPLATELET/ANTICOAGULANT THERAPY:  none     OTHER ISSUES:     # hypothyroidism, stable  Tolerating meds, TSH normal   - continue current treatment plan, defer mgmt to PCP      # normocytic, hypochromic anemia - mild, no sx   - defer further w/u to PCP     Studies Ordered At This Visit: none    Blood Work to Be Obtained Prior to Next Visit: as noted below   Disposition: RTC in 2 months    Valerio Snyder M.D.  [facility]   [facility]   [phone]

## 2023-11-13 NOTE — PROGRESS NOTES
Debra Stockton  No showed to follow-up with the vascular medicine clinic.     Scheduling staff will contact to reschedule WITH ANY AVAILABLE PROVIDER and remind patient of the importance of maintaining appointments as scheduled or to contact our office at least 24 hours in advance if they need to reschedule.       Patient should be aware of the potential for worsening conditions without appropriate follow-up and monitoring.      [facility]   [facility]   [phone]

## 2023-11-29 ENCOUNTER — TELEPHONE (OUTPATIENT)
Dept: VASCULAR LAB | Facility: MEDICAL CENTER | Age: 83
End: 2023-11-29
Payer: MEDICARE

## 2023-11-29 DIAGNOSIS — I10 PRIMARY HYPERTENSION: ICD-10-CM

## 2023-11-29 RX ORDER — HYDROCHLOROTHIAZIDE 25 MG/1
25 TABLET ORAL DAILY
Qty: 90 TABLET | Refills: 3 | Status: SHIPPED | OUTPATIENT
Start: 2023-11-29

## 2023-11-30 NOTE — PROGRESS NOTES
Spoke with patient and relayed message from Dr Snyder to patient's daughter. And if concerned to go to ER/UC for s/s.       Patient was still concerned about Rx changes and wanted lab work done, she was adamant about being seen in person with Dr Snyder.   I scheduled her next available on Monday (12/4)    Sarah Scott, Med Ass't  [facility]  Ph. [phone]  Fx. [phone]

## 2023-11-30 NOTE — TELEPHONE ENCOUNTER
C/o increase swelling in BLE limiting, her walking is limiting due to swelling.slightly better in the morning, does wear compression socks during the day, denies SOB.     Denies inc in salt intake recently.     120/70, 118/68    Fidelia Amador  [phone]

## 2023-11-30 NOTE — PROGRESS NOTES
Worsening edema despite increase olmesartan and decrease amlodipine  Stop amlodipine  Start HCTZ 25mg (had been on prior).  All lytes, gfr stable.   No indications of pain or unilat swelling - low prob DVT  No prior HF indicators on echo 2022.      RN to relay info to pt.

## 2023-11-30 NOTE — TELEPHONE ENCOUNTER
Valerio Snyder M.D.  You23 hours ago (4:47 PM)     МАРИНА  Echo was normal last year, so BNP would not be helpful as she does not have known heart failure to my knowledge.    Let's have her stop amlodipine completely  Start HCTZ 25mg QAM. Rx sent    Call back with status update in about 2 weeks.  Thanks     MA s/w pt dtr, she wanted lab work and to see the provider. Did not want to wait and requested appt

## 2023-12-04 ENCOUNTER — DOCUMENTATION (OUTPATIENT)
Dept: VASCULAR LAB | Facility: MEDICAL CENTER | Age: 83
End: 2023-12-04
Payer: MEDICARE

## 2023-12-04 RX ORDER — NYSTATIN 100000 [USP'U]/G
1 POWDER TOPICAL 2 TIMES DAILY
COMMUNITY
Start: 2023-11-21

## 2023-12-04 RX ORDER — MINOXIDIL 2.5 MG/1
2.5 TABLET ORAL 2 TIMES DAILY
COMMUNITY
Start: 2023-11-26 | End: 2023-12-20

## 2023-12-04 RX ORDER — LISINOPRIL AND HYDROCHLOROTHIAZIDE 20; 12.5 MG/1; MG/1
TABLET ORAL
COMMUNITY
Start: 2023-09-29 | End: 2023-12-20

## 2023-12-04 ASSESSMENT — ENCOUNTER SYMPTOMS
CHILLS: 0
PND: 0
ORTHOPNEA: 0
COUGH: 0
WHEEZING: 0
FEVER: 0
SPUTUM PRODUCTION: 0
CLAUDICATION: 0
PALPITATIONS: 0
SHORTNESS OF BREATH: 0
HEMOPTYSIS: 0

## 2023-12-04 NOTE — PROGRESS NOTES
RESISTANT HTN CLINIC - FOLLOW-UP   12/20/23      Debra Stockton is a female seen for eval/mgmt of HTN, est 8/2023  Debra Stockton is initially referred by n/a    Subjective      Interval hx/concerns: last seen 11/2023, had concern about leg swelling and wanted prompt appt, scheduled for 12/4/23 but no-showed to appt.  Here for f/u and notes since stopping amlodipine, minoxidil     HTN:  Home BP log: variable 120s/70s on avg - reports today's BP readings are not common for her but she was very stressed getting here   Adherence to current HTN meds: compliant all of the time  Initial visit hx:  seen 6/2023 by PCP and has ongoing HTN despite current tx, most recent had metoprolol increased from 25 to 50mg ER daily with limited impact on BP    Lifestyle factors:  Weight Change: stable   There is no height or weight on file to calculate BMI.   Wt Readings from Last 3 Encounters:   11/13/23 58.1 kg (128 lb)   10/04/23 56.7 kg (125 lb)   08/31/23 58.1 kg (128 lb)      Diet pattern: common adult  Daily salt intake estimate:  Low     EtOH: No  Exercise habits: no regular exercise program  Perceived barriers to care: age, walker, imbalance  Pertinent HTN hx (reviewed at initial visit):   Age at HTN dx: most of her life   (if female, any hx of pregnancy-related HTN): cannot recall   Past HTN medications: as noted   HTN crises:  recently in ED due to BP spike   Interfering substances/contributing factors:  NSAIDs/Koch-2i: Yes, Details: celebrex 200mg daily     Hyperlipidemia:Stable, Current treatment: no current medications    Antiplatelet/anticoagulation: No, no bleeding noted      CKD: Yes, Details: G3b, no prior UACR        Current Outpatient Medications:     Nyamyc, 1 Application, Topical, BID, Taking    hydroCHLOROthiazide, 25 mg, Oral, DAILY, Taking    olmesartan, 40 mg, Oral, DAILY, Taking    Nirmatrelvir&Ritonavir 150/100, Take 150 mg nirmatrelvir (one 150 mg tablet) with 100 mg ritonavir (one 100 mg tablet) by  mouth, with both tablets taken together twice daily for 5 days., Taking    benzonatate, 200 mg, Oral, TID PRN, Taking    tizanidine, TAKE 1/2-1 TABLET BY MOUTH TWICE A DAY AS NEEDED MUSCLE SPASMS, Taking    timolol, INSTILL 1 DROP IN RIGHT EYE EVERY MORNING., Taking    neomycin-polymixin-dexamethasone, APPLY A 1/2 INCH STRIP INTO RIGHT EYE AT BEDTIME, Taking    levothyroxine, 100 mcg, Oral, QDAY, Taking    celecoxib, 200 mg, Oral, BID, Taking    brimonidine, INSTILL ONE DROP INTO RIGHT EYE TWICE DAILY, Taking    lidocaine, 1 Patch, Transdermal, Q24HRS, Taking    Loratadine, 10 mg, Oral, QDAY PRN, Taking    Combigan, INSTILL ONE DROP IN BOTH EYES TWICE DAILY, Taking    dorzolamide, Place  in right eye 2 Times a Day., Taking    Travatan Z, Place  in both eyes 2 Times a Day., Taking    meclizine, 25 mg, Oral, TID PRN, Taking   Patient has no known allergies.    Social History     Tobacco Use    Smoking status: Never    Smokeless tobacco: Never   Vaping Use    Vaping Use: Never used   Substance Use Topics    Alcohol use: No    Drug use: No      Review of Systems   Constitutional:  Negative for chills, fever and malaise/fatigue.   Respiratory:  Negative for cough, hemoptysis, sputum production, shortness of breath and wheezing.    Cardiovascular:  Negative for chest pain, palpitations, orthopnea, claudication, leg swelling and PND.           Objective    Vitals:    12/20/23 1353 12/20/23 1356   BP: (!) 171/80 (!) 175/83   BP Location: Left arm Left arm   Patient Position: Sitting Sitting   BP Cuff Size: Adult Adult   Pulse: 80 79       There is no height or weight on file to calculate BMI.  BP Readings from Last 5 Encounters:   12/20/23 (!) 175/83   11/13/23 138/72   10/04/23 130/72   08/31/23 (!) 174/75   08/13/23 103/47      Physical Exam  Vitals reviewed.   Constitutional:       General: She is not in acute distress.     Appearance: Normal appearance.   HENT:      Head: Normocephalic and atraumatic.   Neck:       "Thyroid: No thyroid mass.      Vascular: Normal carotid pulses.      Trachea: Trachea normal.   Cardiovascular:      Rate and Rhythm: Normal rate and regular rhythm.      Chest Wall: PMI is not displaced.      Pulses: Normal pulses.           Carotid pulses are 2+ on the right side and 2+ on the left side.       Radial pulses are 2+ on the right side and 2+ on the left side.        Dorsalis pedis pulses are 2+ on the right side and 2+ on the left side.        Posterior tibial pulses are 2+ on the right side and 2+ on the left side.      Heart sounds: Normal heart sounds.   Pulmonary:      Effort: Pulmonary effort is normal.      Breath sounds: Normal breath sounds.   Musculoskeletal:      Cervical back: Full passive range of motion without pain.      Right lower leg: No edema.      Left lower leg: No edema.   Skin:     General: Skin is warm and dry.      Capillary Refill: Capillary refill takes less than 2 seconds.      Coloration: Skin is not cyanotic.      Nails: There is no clubbing.   Neurological:      General: No focal deficit present.      Mental Status: She is alert and oriented to person, place, and time. Mental status is at baseline.      Cranial Nerves: No cranial nerve deficit.      Coordination: Coordination is intact. Coordination normal.      Gait: Gait is intact. Gait normal.   Psychiatric:         Mood and Affect: Mood normal.         Behavior: Behavior normal.        Accessory Clinical Findings:     Lab Results   Component Value Date    CHOLSTRLTOT 177 03/24/2023    LDL 92 03/24/2023    HDL 71 03/24/2023    TRIGLYCERIDE 72 03/24/2023      No results found for: \"LIPOPROTA\"   No results found for: \"APOB\"         Lab Results   Component Value Date    HBA1C 6.4 (H) 03/24/2023      Lab Results   Component Value Date    SODIUM 135 11/06/2023    POTASSIUM 4.0 11/06/2023    CHLORIDE 100 11/06/2023    CO2 23 11/06/2023    GLUCOSE 96 11/06/2023    BUN 18 11/06/2023    CREATININE 0.88 11/06/2023          Lab " Results   Component Value Date    URCREAT 83.04 11/06/2023    MICROALBUR <1.2 11/06/2023    MALBCRT see below 11/06/2023     Lab Results   Component Value Date/Time    MALBCRT see below 11/06/2023 09:14 AM    MICROALBUR <1.2 11/06/2023 09:14 AM      VASCULAR IMAGING:    VR duplex 2017   Complete color filling and compressibility with normal deep vein venous    flow dynamics including spontaneous flow, response to augmentation    maneuvers, and respiratory phasicity.     No deep vein reflux.   Greater than 2 seconds reflux in the greater saphenous vein from the    saphenofemoral junction to the knee.   No reflux in the short saphenous vein.   Reflux greater than 2 seconds in a varicose vein in the lateral calf.    MR brain 2022  1.  Stable tiny T2 hypointense lesion in the left IAC likely representing a small residual schwannoma.  2.  Postsurgical changes in the left side of the posterior fossa.  3.  Mild cerebral volume loss.  4.  Mild chronic microvascular ischemic disease.  5.  There has been no significant interval change.    Echo 6/2022  No prior study is available for comparison.   Normal left ventricular size, wall thickness and systolic function.  No regional wall motion abnormality noted.  No hemodynamically significant valvular heart disease noted.    CT head 8/2023  1.  Chronic microvascular ischemic type changes.  2.  Left suboccipital craniectomy with left cerebellar encephalomalacia again noted.  3.  No acute intracranial abnormality.              MEDICAL DECISION-MAKING:  TODAY'S ASSESSMENT / STATUS / PLAN:  1. Primary hypertension  Comp Metabolic Panel    Lipid Profile    MICROALBUMIN CREAT RATIO URINE      2. Small vessel disease, cerebrovascular        3. Other specified hypothyroidism        4. Microcytic anemia        5. Prediabetes        6. CKD stage G3b/A1, GFR 30-44 and albumin creatinine ratio <30 mg/g ([facility])  Comp Metabolic Panel    Lipid Profile    MICROALBUMIN CREAT RATIO URINE      7.  Uncomplicated alcohol dependence (HCC)        8. Chronic venous insufficiency           Patient Type: Primary Prevention    1. BLOOD PRESSURE CONTROL   Qualifies as Resistant HTN (RH):  No, not on optimized, max-dosed or max-tolerated meds from 3 classes   Office BP Goal ACC/AHA (2017) goal <130/80, based upon age <140/80 reasonable   Home BP at goal:  no  Office BP at goal:  improving, WCE noted   24h ABPM:  not ordered to date   Contributing factors: CKD, daily celebrex, wide pulse pressure presumed to be arteriosclerotic changes  - will need to avoid polypharmacy and avoid excessive BP lowering to reduce risks for falls, dizziness   Plan:   - visit with PCP to review if celebrex can be changed to other safer option to reduce contributions to BP and CKD   Monitoring:   - start/continue home BP monitoring, reviewed correct technique:  Yes   - order 24h ABPM: UNDECIDED  - monitor lytes/gfr routinely     2. WORK UP FOR SECONDARY CAUSES OF RH:  Obstructive Sleep Apnea:  low prob, denies snoring   Renal parenchymal disease:  G3b, has recently worsened from G3a  - should f/u with PCP to review alternatives to celebrex   - for now would consider tylenol and topical diclofenac     Renovascular HTN: Not Yet Assessed  Primary Aldosteronism: Not Yet Assessed  Thyroid Function: Excluded, stable treated hypothyroidism   Pheochromocytoma:  low prob   phenotype   Cushing's:    low prob phenotype     Coarctation of Aorta: excluded  Other: none identified    Recommendations At This Visit: as noted in orders         3. MEDICATION USE / ADHERENCE:   Assessment: Complete  Recommendations: Instructed to Continue Taking All Medications As Prescribed         4. HTN-MEDIATED END-ORGAN DAMAGE:  Left Ventricular Hypertrophy: Absent on  Echocardiogram Date: 2022  Urine Albuminuria: None on Date: 2023   Renal Function: G2, improved from G3b  Ophthalmologic: Absent per patient report and/or eye specialist   Established Cardiovascular Disease:      # chronic small vessel CeVD - per MR, CT, presumed HTN-related, denies hx of CVA/TIA     5. LIFESTYLE INTERVENTIONS:    SMOKING:    reports that she has never smoked. She has never used smokeless tobacco.   - continued complete avoidance of all tobacco products     PHYSICAL ACTIVITY: continue healthy activity to improve CV fitness.  In general, targeting >150min/week of moderate-level activity or as much as tolerated in light of functional status and co-morbidities     WEIGHT MANAGEMENT AND NUTRITION: DASH style dietary approach  and Focus on reduced sodium to <2,000mg per day      6. STANDARD HTN PHARMACOTHERAPY:  - continue olmesartan to 40mg daily , prior stopped lisinopril/hctz   - stopped  amlodipine- edema with even 2.5mg   - continue HCTZ 25mg daily     7. ADDITIONAL AGENTS   - stopped metoprolol to 25mg ER daily   - stopped minoxidil 2.5mg - unnecessary and hard to use medication     LIPID MANAGEMENT:   Qualifies for Statin Therapy Based on 2018 ACC/AHA Guidelines: N\A, no guidelines for >76yo  The ASCVD Risk score (Toni DK, et al., 2019) failed to calculate., N/A  Major ASCVD events: None    High-risk conditions: N/A  Risk-enhancers: N/A  Currently on Statin: No  Tx goals: LDL-C <100 (if HTG, consider apoB <90, non-HDL-C <130)  At goal? yes  Plan:   - reinforced ongoing TLC measures as noted   - monitor labs   Meds: none     GLYCEMIA MANAGEMENT:  Normal    ANTIPLATELET/ANTICOAGULANT THERAPY:  none     OTHER ISSUES:     # hypothyroidism, stable  Tolerating meds, TSH normal  Plan:   - continue current treatment plan, defer mgmt to PCP      # normocytic, hypochromic anemia - mild, no sx   Plan:  - defer further w/u to PCP     Studies Ordered At This Visit: none    Blood Work to Be Obtained Prior to Next Visit: as noted below   Disposition: RTC in 6 months    Valerio Snyder M.D.  Vascular Medicine Clinic   [facility]   [phone]

## 2023-12-04 NOTE — PROGRESS NOTES
Debra Stockton  No showed to follow-up with the vascular medicine clinic.     Scheduling staff will contact to reschedule WITH ANY AVAILABLE PROVIDER and remind patient of the importance of maintaining appointments as scheduled or to contact our office at least 24 hours in advance if they need to reschedule.       Patient should be aware of the potential for worsening conditions without appropriate follow-up and monitoring.      Vascular Medicine Clinic   [facility]   [phone]

## 2023-12-05 NOTE — PROGRESS NOTES
December 5, 2023     Sree Posada DO  [address] [facility] [address]    Patient: France Prince   YOB: 1963   Date of Visit: 12/5/2023       Dear Dr. Sree Posada DO:    Thank you for referring France Prince to me for evaluation. Below are my notes for this consultation.  If you have questions, please do not hesitate to call me. I look forward to following your patient along with you.       Sincerely,     Shamir Robles MD      CC: No Recipients  ______________________________________________________________________________________    SUBJECTIVE  Patient ID: France Prince is a 60 y.o. female who presents for New Patient Visit (SORE IN MOUTH).    She presents with mouth sores that have been present for the past month or so. Patient reports that she gets frequent shingles infection but that they typically only affect her left side and usually affect her lower and upper back. Recently, she noticed a lesion along the right lower jaw that has been painful, as well as a small lesion on the right lower lip. She had been seen by dentistry and had an overall unremarkable examination. She also states she had imaging performed that was negative. She was originally concerned that it could represent oral shingles. She was also recently diagnosed with bronchitis and a sinus infection. This morning, she was pushing against the lesion with her finger and felt a pop and release of some hard material, which she likened to bone. She is unsure if maybe some food or other material may have gotten stick in there, but presently she feels almost completely back to baseline. She has been told she had mandibular ammon in the past.  She is not a smoker and drinks socially. No changes in swallow, voice, or breathing. No new neck masses noted.       Review of Systems  Complete ROS negative except as noted above or on patient intake form and as above.    OBJECTIVE  Physical Exam  CONSTITUTIONAL: Well  Pt aaox4. Czech speaking. SKYE 5/5. Denies N/T. Up w/ SBA, steady gait. Pt c/o neck/throat pain, Oxy and throat lozenge given with +results. Pt having trouble swallowing regular diet, pt changed to full liquid diet, no swallowing issues with liquid diet noted. +BS. Voiding w/o difficulty. C- collar in place when OOB. Reviewed poc with pt-verbalized understanding. Call light in reach. Pt spoke with niece over the phone. This RN spoke with pts sister over the phone with - discussed pt will d/c home tomorrow and will need to stay with family upon discharge.    appearing female who appears stated age.  PSYCHIATRIC: Alert, appropriate mood and affect.  RESPIRATORY: Normal inspiration and expiration and chest wall expansion; no use of accessory muscles to breathe.  VOICE: Clear speech without hoarseness. No stridor nor stertor.  HEAD, FACE, AND SKIN: Symmetric facial feature. No cutaneous masses or lesions were visualized. The parotid and submandibular glands were normal to palpation.  EYES: Pupils were equal in size and reactive to light. Extra-ocular muscle function was intact. No nystagmus was observed. Vision was grossly intact.  EARS: External ears were normally formed with no lesions. The external auditory canals were clear. The tympanic membranes were intact and in the neutral position. No significant retraction pockets nor effusions were appreciated.  ORAL CAVITY: Small, submucosal nodule of the right lower lip. Along the right lingual surface of the mandible are several ammon, the most posterior of which has a small area of raised, pale mucosa which has sharp edges and is not friable. There is a palpable divot deep to this area.  DENTITION: Grossly normal without obvious infection nor inflammation.  OROPHARYNX: No lesion nor mucosal abnormality. The uvula was normal appearing. The tonsils were unremarkable.   NECK: Visualization and palpation of the neck revealed no mass lesions, no thyromegaly or thyroid masses. No cutaneous lesions appreciated.  LYMPHATICS (CERVICAL): There were no palpable lymph nodes in the posterior triangle, submandibular triangle, jugulodigastric region, nor central neck.  NEUROLOGIC: Cranial nerves III, IV, and VI were noted to be intact via extra-ocular muscle movement testing. Cranial nerve V was noted to be intact to soft touch bilaterally. Cranial nerve VII was noted to be intact and symmetric by facial movement. Cranial nerve VIII was tested with normal voice examination and revealed grossly normal hearing. Cranial nerves IX and X noted  to be intact by palatal movement. Cranial nerve XI noted to be intact via shoulder shrug.  Cranial nerve XII noted to be intact with active and symmetric tongue movement.        ASSESSMENT/PLAN  Diagnoses and all orders for this visit:  Torus mandibularis  Oral lesion    60 y.o. female who presents for evaluation of several oral lesions which have been present for the past month. On examination, she was found to have extensive mandibular ammon and on the right more posterior torus, there were some overlying mucosal changes that were quite well-circumscribed, non-friable, and benign-appearing. It seems that the mucosal changes have been related to an ongoing process in that area given the swelling and pain she had, as well as the debris she pushed out. We discussed a repeat examination in several months to ensure resolution of the changes or some improvement and she will follow-up sooner if there are worsening symptoms. Overall, we are reassured by her rapid improvement in symptoms.     This note was created using speech recognition transcription software. Despite proofreading, typographical errors may be present that affect the meaning of the content. Please contact my office with any questions.      Attestation signed by Shamir Robles MD at 12/5/2023  5:26 PM (Updated):  I saw and evaluated the patient. I personally obtained the key and critical portions of the history and physical exam or was physically present for key and critical portions performed by the resident/fellow. I reviewed the resident/fellow's documentation and discussed the patient with the resident/fellow. I agree with the resident/fellow's medical decision making as documented in the note with the addition of the following:    This patient reports that she recently expelled some bony debris off of the right mandibular torus.  On exam there is a slight depression along the mandibular torus with some overlying tissue consistent with granulation  tissue or hypertrophic mucosa.  Findings are suggestive of some type of inflammatory process.  The patient now reports that symptoms seem resolved.    For now I recommended observation as the area does not seem malignant in nature.  If the patient continues to have difficulty we could consider a biopsy of this mucosal lining or evaluation with oral surgery.

## 2023-12-20 ENCOUNTER — OFFICE VISIT (OUTPATIENT)
Dept: VASCULAR LAB | Facility: MEDICAL CENTER | Age: 83
End: 2023-12-20
Attending: FAMILY MEDICINE
Payer: MEDICARE

## 2023-12-20 VITALS — HEART RATE: 79 BPM | DIASTOLIC BLOOD PRESSURE: 83 MMHG | SYSTOLIC BLOOD PRESSURE: 175 MMHG

## 2023-12-20 DIAGNOSIS — R73.03 PREDIABETES: ICD-10-CM

## 2023-12-20 DIAGNOSIS — I10 PRIMARY HYPERTENSION: ICD-10-CM

## 2023-12-20 DIAGNOSIS — F10.20 UNCOMPLICATED ALCOHOL DEPENDENCE (HCC): ICD-10-CM

## 2023-12-20 DIAGNOSIS — N18.32 CKD STAGE G3B/A1, GFR 30-44 AND ALBUMIN CREATININE RATIO <30 MG/G: ICD-10-CM

## 2023-12-20 DIAGNOSIS — E03.8 OTHER SPECIFIED HYPOTHYROIDISM: ICD-10-CM

## 2023-12-20 DIAGNOSIS — I67.9 SMALL VESSEL DISEASE, CEREBROVASCULAR: ICD-10-CM

## 2023-12-20 DIAGNOSIS — D50.9 MICROCYTIC ANEMIA: ICD-10-CM

## 2023-12-20 DIAGNOSIS — I87.2 CHRONIC VENOUS INSUFFICIENCY: ICD-10-CM

## 2023-12-20 PROCEDURE — 99212 OFFICE O/P EST SF 10 MIN: CPT

## 2023-12-20 PROCEDURE — 3079F DIAST BP 80-89 MM HG: CPT | Performed by: FAMILY MEDICINE

## 2023-12-20 PROCEDURE — 99213 OFFICE O/P EST LOW 20 MIN: CPT | Performed by: FAMILY MEDICINE

## 2023-12-20 PROCEDURE — 3077F SYST BP >= 140 MM HG: CPT | Performed by: FAMILY MEDICINE

## 2023-12-29 ENCOUNTER — TELEPHONE (OUTPATIENT)
Dept: SCHEDULING | Facility: IMAGING CENTER | Age: 83
End: 2023-12-29
Payer: MEDICARE

## 2023-12-29 DIAGNOSIS — I10 PRIMARY HYPERTENSION: ICD-10-CM

## 2023-12-29 RX ORDER — OLMESARTAN MEDOXOMIL 40 MG/1
40 TABLET ORAL DAILY
Qty: 100 TABLET | Refills: 0 | Status: SHIPPED | OUTPATIENT
Start: 2023-12-29 | End: 2023-12-29 | Stop reason: SDUPTHER

## 2023-12-29 RX ORDER — OLMESARTAN MEDOXOMIL 40 MG/1
40 TABLET ORAL DAILY
Qty: 100 TABLET | Refills: 3 | Status: SHIPPED | OUTPATIENT
Start: 2023-12-29

## 2023-12-29 NOTE — TELEPHONE ENCOUNTER
Received request via: Patient    Was the patient seen in the last year in this department? Yes, 12/20/23 w/ Dr Snyder    Does the patient have an active prescription (recently filled or refills available) for medication(s) requested? No    Does the patient have prison [facility] and need 100 day supply (blood pressure, diabetes and cholesterol meds only)? Yes, quantity updated to 100 days

## 2023-12-29 NOTE — TELEPHONE ENCOUNTER
Caller: Fidelia - Relative    Medication Name and Dosage: olmesartan (BENICAR) 40 MG Tab      Medication amount left: 0    Preferred Pharmacy: [id number] - [place], [place] - [address] [[id number]     Other questions (Topic): Caller states she called yesterday and was mistaken and requested the incorrect medication refill. Patient is completely out of her Olmesartan and states that only 30 day supply was sent last time and they are asking if a 90 day supply can be sent instead.     Callback Number (Will only call for issues): [phone] (home)     Thank you,  Leydi FOX

## 2024-03-18 ENCOUNTER — HOSPITAL ENCOUNTER (OUTPATIENT)
Facility: MEDICAL CENTER | Age: 84
End: 2024-03-18
Attending: FAMILY MEDICINE
Payer: MEDICARE

## 2024-03-18 PROCEDURE — 87086 URINE CULTURE/COLONY COUNT: CPT

## 2024-03-21 LAB
BACTERIA UR CULT: NORMAL
SIGNIFICANT IND 70042: NORMAL
SITE SITE: NORMAL
SOURCE SOURCE: NORMAL

## 2024-05-03 ENCOUNTER — HOSPITAL ENCOUNTER (OUTPATIENT)
Dept: LAB | Facility: MEDICAL CENTER | Age: 84
End: 2024-05-03
Attending: FAMILY MEDICINE
Payer: MEDICARE

## 2024-05-03 DIAGNOSIS — I10 PRIMARY HYPERTENSION: ICD-10-CM

## 2024-05-03 DIAGNOSIS — N18.32 CKD STAGE G3B/A1, GFR 30-44 AND ALBUMIN CREATININE RATIO <30 MG/G: ICD-10-CM

## 2024-05-03 LAB
ALBUMIN SERPL BCP-MCNC: 4.3 G/DL (ref 3.2–4.9)
ALBUMIN/GLOB SERPL: 1.3 G/DL
ALP SERPL-CCNC: 115 U/L (ref 30–99)
ALT SERPL-CCNC: 21 U/L (ref 2–50)
ANION GAP SERPL CALC-SCNC: 12 MMOL/L (ref 7–16)
AST SERPL-CCNC: 30 U/L (ref 12–45)
BILIRUB SERPL-MCNC: 0.4 MG/DL (ref 0.1–1.5)
BUN SERPL-MCNC: 27 MG/DL (ref 8–22)
CALCIUM ALBUM COR SERPL-MCNC: 10 MG/DL (ref 8.5–10.5)
CALCIUM SERPL-MCNC: 10.2 MG/DL (ref 8.5–10.5)
CHLORIDE SERPL-SCNC: 101 MMOL/L (ref 96–112)
CHOLEST SERPL-MCNC: 165 MG/DL (ref 100–199)
CO2 SERPL-SCNC: 24 MMOL/L (ref 20–33)
CREAT SERPL-MCNC: 0.82 MG/DL (ref 0.5–1.4)
CREAT UR-MCNC: 53.42 MG/DL
GFR SERPLBLD CREATININE-BSD FMLA CKD-EPI: 71 ML/MIN/1.73 M 2
GLOBULIN SER CALC-MCNC: 3.4 G/DL (ref 1.9–3.5)
GLUCOSE SERPL-MCNC: 103 MG/DL (ref 65–99)
HDLC SERPL-MCNC: 68 MG/DL
LDLC SERPL CALC-MCNC: 86 MG/DL
MICROALBUMIN UR-MCNC: <1.2 MG/DL
MICROALBUMIN/CREAT UR: NORMAL MG/G (ref 0–30)
POTASSIUM SERPL-SCNC: 4.4 MMOL/L (ref 3.6–5.5)
PROT SERPL-MCNC: 7.7 G/DL (ref 6–8.2)
SODIUM SERPL-SCNC: 137 MMOL/L (ref 135–145)
TRIGL SERPL-MCNC: 56 MG/DL (ref 0–149)

## 2024-05-07 ENCOUNTER — OFFICE VISIT (OUTPATIENT)
Dept: VASCULAR LAB | Facility: MEDICAL CENTER | Age: 84
End: 2024-05-07
Attending: NURSE PRACTITIONER
Payer: MEDICARE

## 2024-05-07 VITALS
SYSTOLIC BLOOD PRESSURE: 176 MMHG | DIASTOLIC BLOOD PRESSURE: 81 MMHG | HEART RATE: 99 BPM | WEIGHT: 127 LBS | HEIGHT: 58 IN | BODY MASS INDEX: 26.66 KG/M2

## 2024-05-07 DIAGNOSIS — I10 PRIMARY HYPERTENSION: ICD-10-CM

## 2024-05-07 PROCEDURE — 3079F DIAST BP 80-89 MM HG: CPT | Performed by: NURSE PRACTITIONER

## 2024-05-07 PROCEDURE — 99214 OFFICE O/P EST MOD 30 MIN: CPT | Performed by: NURSE PRACTITIONER

## 2024-05-07 PROCEDURE — 3077F SYST BP >= 140 MM HG: CPT | Performed by: NURSE PRACTITIONER

## 2024-05-07 ASSESSMENT — ENCOUNTER SYMPTOMS
COUGH: 0
SPUTUM PRODUCTION: 0
PND: 0
SHORTNESS OF BREATH: 0
WHEEZING: 0
ORTHOPNEA: 0
HEMOPTYSIS: 0
CHILLS: 0
CLAUDICATION: 0
FEVER: 0
PALPITATIONS: 0

## 2024-05-07 ASSESSMENT — FIBROSIS 4 INDEX: FIB4 SCORE: 2.54

## 2024-05-07 NOTE — PROGRESS NOTES
RESISTANT HTN CLINIC - FOLLOW-UP   05/07/2024     Debra Stockton is a female seen for eval/mgmt of HTN, est 8/2023  Debra Stockton is initially referred by n/a    Subjective     used through [employer].     Interval hx/concerns: last seen 12/2023   Here for f/u and notes since stopping amlodipine, minoxidil     HTN:  Home BP log: variable 120s/70s on avg - reports today's BP readings are not common for her but she was very stressed getting here     Adherence to current HTN meds: compliant all of the time  Initial visit hx:  seen 6/2023 by PCP and has ongoing HTN despite current tx, metoprolol with limited effect on BP, stopped in past     Lifestyle factors:  Weight Change: stable   Body mass index is 26.54 kg/m².   Wt Readings from Last 3 Encounters:   05/07/24 57.6 kg (127 lb)   11/13/23 58.1 kg (128 lb)   10/04/23 56.7 kg (125 lb)      Diet pattern: common adult  Daily salt intake estimate:  Low     EtOH: No  Exercise habits: no regular exercise program  Perceived barriers to care: age, walker, imbalance  Pertinent HTN hx (reviewed at initial visit):   Age at HTN dx: most of her life   (if female, any hx of pregnancy-related HTN): cannot recall   Past HTN medications: as noted   HTN crises:  recently in ED due to BP spike   Interfering substances/contributing factors:  NSAIDs/Koch-2i: Yes, Details: stopped celebrex      Hyperlipidemia:Stable, Current treatment: no current medications    Antiplatelet/anticoagulation: No, no bleeding noted      CKD: Yes, Details: G3b, no prior UACR        Current Outpatient Medications:     olmesartan, 40 mg, Oral, DAILY, Taking    Nyamyc, 1 Application, Topical, BID, Taking    hydroCHLOROthiazide, 25 mg, Oral, DAILY, Taking    timolol, INSTILL 1 DROP IN RIGHT EYE EVERY MORNING., Taking    neomycin-polymixin-dexamethasone, APPLY A 1/2 INCH STRIP INTO RIGHT EYE AT BEDTIME, Taking    levothyroxine, 100 mcg, Oral, QDAY, Taking    brimonidine, INSTILL ONE  "DROP INTO RIGHT EYE TWICE DAILY, Taking    lidocaine, 1 Patch, Transdermal, Q24HRS, Taking    Loratadine, 10 mg, Oral, QDAY PRN, Taking    Combigan, INSTILL ONE DROP IN BOTH EYES TWICE DAILY, Taking    dorzolamide, Place  in right eye 2 Times a Day., Taking    Travatan Z, Place  in both eyes 2 Times a Day., Taking    meclizine, 25 mg, Oral, TID PRN, Taking   Patient has no known allergies.    Social History     Tobacco Use    Smoking status: Never    Smokeless tobacco: Never   Vaping Use    Vaping Use: Never used   Substance Use Topics    Alcohol use: No    Drug use: No      Review of Systems   Constitutional:  Negative for chills, fever and malaise/fatigue.   Respiratory:  Negative for cough, hemoptysis, sputum production, shortness of breath and wheezing.    Cardiovascular:  Negative for chest pain, palpitations, orthopnea, claudication, leg swelling and PND.         Objective    Vitals:    05/07/24 1424 05/07/24 1428   BP: (!) 177/81 (!) 176/81   BP Location: Left arm Left arm   Patient Position: Sitting Sitting   BP Cuff Size: Adult Adult   Pulse: 86 99   Weight: 57.6 kg (127 lb)    Height: 1.473 m (4' 10\")      Body mass index is 26.54 kg/m².  BP Readings from Last 5 Encounters:   05/07/24 (!) 176/81   12/20/23 (!) 175/83   11/13/23 138/72   10/04/23 130/72   08/31/23 (!) 174/75      Physical Exam  Vitals reviewed.   Constitutional:       General: She is not in acute distress.     Appearance: Normal appearance.   HENT:      Head: Normocephalic and atraumatic.   Neck:      Thyroid: No thyroid mass.      Vascular: Normal carotid pulses.      Trachea: Trachea normal.   Cardiovascular:      Rate and Rhythm: Normal rate and regular rhythm.      Chest Wall: PMI is not displaced.      Pulses: Normal pulses.           Carotid pulses are 2+ on the right side and 2+ on the left side.       Radial pulses are 2+ on the right side and 2+ on the left side.        Dorsalis pedis pulses are 2+ on the right side and 2+ on the " left side.        Posterior tibial pulses are 2+ on the right side and 2+ on the left side.      Heart sounds: Normal heart sounds.   Pulmonary:      Effort: Pulmonary effort is normal.      Breath sounds: Normal breath sounds.   Musculoskeletal:      Cervical back: Full passive range of motion without pain.      Right lower leg: No edema.      Left lower leg: No edema.   Skin:     General: Skin is warm and dry.      Capillary Refill: Capillary refill takes less than 2 seconds.      Coloration: Skin is not cyanotic.      Nails: There is no clubbing.   Neurological:      General: No focal deficit present.      Mental Status: She is alert and oriented to person, place, and time. Mental status is at baseline.      Cranial Nerves: No cranial nerve deficit.      Coordination: Coordination is intact. Coordination normal.      Gait: Gait is intact. Gait normal.   Psychiatric:         Mood and Affect: Mood normal.         Behavior: Behavior normal.        Accessory Clinical Findings:     Lab Results   Component Value Date    CHOLSTRLTOT 165 05/03/2024    LDL 86 05/03/2024    HDL 68 05/03/2024    TRIGLYCERIDE 56 05/03/2024          Lab Results   Component Value Date    HBA1C 6.4 (H) 03/24/2023      Lab Results   Component Value Date    SODIUM 137 05/03/2024    POTASSIUM 4.4 05/03/2024    CHLORIDE 101 05/03/2024    CO2 24 05/03/2024    GLUCOSE 103 (H) 05/03/2024    BUN 27 (H) 05/03/2024    CREATININE 0.82 05/03/2024          Lab Results   Component Value Date    URCREAT 53.42 05/03/2024    MICROALBUR <1.2 05/03/2024    MALBCRT see below 05/03/2024     Lab Results   Component Value Date/Time    MALBCRT see below 05/03/2024 09:32 AM    MICROALBUR <1.2 05/03/2024 09:32 AM      VASCULAR IMAGING:    VR duplex 2017   Complete color filling and compressibility with normal deep vein venous    flow dynamics including spontaneous flow, response to augmentation    maneuvers, and respiratory phasicity.     No deep vein reflux.   Greater  than 2 seconds reflux in the greater saphenous vein from the    saphenofemoral junction to the knee.   No reflux in the short saphenous vein.   Reflux greater than 2 seconds in a varicose vein in the lateral calf.    MR brain 2022  1.  Stable tiny T2 hypointense lesion in the left IAC likely representing a small residual schwannoma.  2.  Postsurgical changes in the left side of the posterior fossa.  3.  Mild cerebral volume loss.  4.  Mild chronic microvascular ischemic disease.  5.  There has been no significant interval change.    Echo 6/2022  No prior study is available for comparison.   Normal left ventricular size, wall thickness and systolic function.  No regional wall motion abnormality noted.  No hemodynamically significant valvular heart disease noted.    CT head 8/2023  1.  Chronic microvascular ischemic type changes.  2.  Left suboccipital craniectomy with left cerebellar encephalomalacia again noted.  3.  No acute intracranial abnormality.    MEDICAL DECISION-MAKING:  TODAY'S ASSESSMENT / STATUS / PLAN:  1. Primary hypertension          Patient Type: Primary Prevention    1. BLOOD PRESSURE CONTROL   Qualifies as Resistant HTN (RH):  No, not on optimized, max-dosed or max-tolerated meds from 3 classes   Office BP Goal ACC/AHA (2017) goal <130/80, based upon age <140/80 reasonable   Home BP at goal:  no  Office BP at goal:  improving, WCE noted   24h ABPM:  not ordered to date   Contributing factors: CKD, stopped celebrex, wide pulse pressure presumed to be arteriosclerotic changes  - will need to avoid polypharmacy and avoid excessive BP lowering to reduce risks for falls, dizziness   Plan:   Monitoring:   - start/continue home BP monitoring, reviewed correct technique:  Yes   - order 24h ABPM: UNDECIDED  - monitor lytes/gfr routinely     2. WORK UP FOR SECONDARY CAUSES OF RH:  Obstructive Sleep Apnea:  low prob, denies snoring   Renal parenchymal disease:  G3b, has recently worsened from G3a  - should  f/u with PCP to review alternatives to celebrex   - for now would consider tylenol and topical diclofenac     Renovascular HTN: Not Yet Assessed  Primary Aldosteronism: Not Yet Assessed  Thyroid Function: Excluded, stable treated hypothyroidism   Pheochromocytoma:  low prob   phenotype   Cushing's:    low prob phenotype     Coarctation of Aorta: excluded  Other: none identified    Recommendations At This Visit: as noted in orders         3. MEDICATION USE / ADHERENCE:   Assessment: Complete  Recommendations: Instructed to Continue Taking All Medications As Prescribed         4. HTN-MEDIATED END-ORGAN DAMAGE:  Left Ventricular Hypertrophy: Absent on  Echocardiogram Date: 2022  Urine Albuminuria: None on Date: 2023   Renal Function: G2, improved from G3b  Ophthalmologic: Absent per patient report and/or eye specialist   Established Cardiovascular Disease:     # chronic small vessel CeVD - per MR, CT, presumed HTN-related, denies hx of CVA/TIA     5. LIFESTYLE INTERVENTIONS:    SMOKING:    reports that she has never smoked. She has never used smokeless tobacco.   - continued complete avoidance of all tobacco products     PHYSICAL ACTIVITY: continue healthy activity to improve CV fitness.  In general, targeting >150min/week of moderate-level activity or as much as tolerated in light of functional status and co-morbidities     WEIGHT MANAGEMENT AND NUTRITION: DASH style dietary approach  and Focus on reduced sodium to <2,000mg per day      6. STANDARD HTN PHARMACOTHERAPY:  - continue olmesartan 40mg daily   - stopped amlodipine- edema with even 2.5mg   - continue HCTZ 25mg daily     7. ADDITIONAL AGENTS   - stopped metoprolol 25mg ER daily   - stopped minoxidil 2.5mg - unnecessary and hard to use medication     LIPID MANAGEMENT:   Qualifies for Statin Therapy Based on 2018 ACC/AHA Guidelines: N\A, no guidelines for >76yo  The ASCVD Risk score ([place] DK, et al., 2019) failed to calculate., N/A  Major ASCVD events: None     High-risk conditions: N/A  Risk-enhancers: N/A  Currently on Statin: No  Tx goals: LDL-C <100 (if HTG, consider apoB <90, non-HDL-C <130)  At goal? yes  Plan:   - reinforced ongoing TLC measures as noted   - monitor labs   Meds: none     GLYCEMIA MANAGEMENT:  Normal    ANTIPLATELET/ANTICOAGULANT THERAPY:  none     OTHER ISSUES:     # hypothyroidism, stable  Tolerating meds, TSH normal  Plan:   - continue current treatment plan, defer mgmt to PCP      # normocytic, hypochromic anemia - mild, no sx   Plan:  - defer further w/u to PCP     Studies Ordered At This Visit: none    Blood Work to Be Obtained Prior to Next Visit: BMP  Disposition: 6 months     ALYSA Hughes  [facility]   [facility]   [phone]

## 2024-05-15 ENCOUNTER — HOSPITAL ENCOUNTER (EMERGENCY)
Facility: MEDICAL CENTER | Age: 84
End: 2024-05-15
Attending: EMERGENCY MEDICINE
Payer: MEDICARE

## 2024-05-15 VITALS
RESPIRATION RATE: 20 BRPM | HEART RATE: 85 BPM | DIASTOLIC BLOOD PRESSURE: 88 MMHG | TEMPERATURE: 97.9 F | OXYGEN SATURATION: 94 % | BODY MASS INDEX: 26.54 KG/M2 | WEIGHT: 127 LBS | SYSTOLIC BLOOD PRESSURE: 204 MMHG

## 2024-05-15 DIAGNOSIS — R42 DIZZINESS: ICD-10-CM

## 2024-05-15 DIAGNOSIS — H61.21 IMPACTED CERUMEN OF RIGHT EAR: ICD-10-CM

## 2024-05-15 LAB
ANION GAP SERPL CALC-SCNC: 12 MMOL/L (ref 7–16)
BASOPHILS # BLD AUTO: 0.6 % (ref 0–1.8)
BASOPHILS # BLD: 0.05 K/UL (ref 0–0.12)
BUN SERPL-MCNC: 25 MG/DL (ref 8–22)
CALCIUM SERPL-MCNC: 9.8 MG/DL (ref 8.5–10.5)
CHLORIDE SERPL-SCNC: 103 MMOL/L (ref 96–112)
CO2 SERPL-SCNC: 22 MMOL/L (ref 20–33)
CREAT SERPL-MCNC: 0.89 MG/DL (ref 0.5–1.4)
EKG IMPRESSION: NORMAL
EOSINOPHIL # BLD AUTO: 0.29 K/UL (ref 0–0.51)
EOSINOPHIL NFR BLD: 3.6 % (ref 0–6.9)
ERYTHROCYTE [DISTWIDTH] IN BLOOD BY AUTOMATED COUNT: 47.5 FL (ref 35.9–50)
GFR SERPLBLD CREATININE-BSD FMLA CKD-EPI: 65 ML/MIN/1.73 M 2
GLUCOSE SERPL-MCNC: 99 MG/DL (ref 65–99)
HCT VFR BLD AUTO: 40.2 % (ref 37–47)
HGB BLD-MCNC: 13.2 G/DL (ref 12–16)
IMM GRANULOCYTES # BLD AUTO: 0.03 K/UL (ref 0–0.11)
IMM GRANULOCYTES NFR BLD AUTO: 0.4 % (ref 0–0.9)
LYMPHOCYTES # BLD AUTO: 2.95 K/UL (ref 1–4.8)
LYMPHOCYTES NFR BLD: 36.3 % (ref 22–41)
MCH RBC QN AUTO: 28.1 PG (ref 27–33)
MCHC RBC AUTO-ENTMCNC: 32.8 G/DL (ref 32.2–35.5)
MCV RBC AUTO: 85.5 FL (ref 81.4–97.8)
MONOCYTES # BLD AUTO: 0.56 K/UL (ref 0–0.85)
MONOCYTES NFR BLD AUTO: 6.9 % (ref 0–13.4)
NEUTROPHILS # BLD AUTO: 4.24 K/UL (ref 1.82–7.42)
NEUTROPHILS NFR BLD: 52.2 % (ref 44–72)
NRBC # BLD AUTO: 0 K/UL
NRBC BLD-RTO: 0 /100 WBC (ref 0–0.2)
PLATELET # BLD AUTO: 174 K/UL (ref 164–446)
PMV BLD AUTO: 11.1 FL (ref 9–12.9)
POTASSIUM SERPL-SCNC: 4.4 MMOL/L (ref 3.6–5.5)
RBC # BLD AUTO: 4.7 M/UL (ref 4.2–5.4)
SODIUM SERPL-SCNC: 137 MMOL/L (ref 135–145)
WBC # BLD AUTO: 8.1 K/UL (ref 4.8–10.8)

## 2024-05-15 RX ORDER — MECLIZINE HYDROCHLORIDE 25 MG/1
25 TABLET ORAL ONCE
Status: COMPLETED | OUTPATIENT
Start: 2024-05-15 | End: 2024-05-15

## 2024-05-15 RX ORDER — OLMESARTAN MEDOXOMIL 20 MG/1
40 TABLET ORAL ONCE
Status: COMPLETED | OUTPATIENT
Start: 2024-05-15 | End: 2024-05-15

## 2024-05-15 RX ADMIN — OLMESARTAN MEDOXOMIL 40 MG: 20 TABLET, FILM COATED ORAL at 20:16

## 2024-05-15 RX ADMIN — MECLIZINE HYDROCHLORIDE 25 MG: 25 TABLET ORAL at 19:26

## 2024-05-15 ASSESSMENT — PAIN DESCRIPTION - PAIN TYPE: TYPE: ACUTE PAIN

## 2024-05-15 ASSESSMENT — FIBROSIS 4 INDEX: FIB4 SCORE: 2.54

## 2024-05-15 NOTE — ED TRIAGE NOTES
.  Chief Complaint   Patient presents with    Dizziness     X 2 days intermittent     Earache     Pt ambulated to triage with walker came by uber. Pt reports intermittent dizziness x 2 days with inner ear pain and ringing. Irish interpretor [id number]  EKG paged.

## 2024-05-16 NOTE — ED NOTES
Bedside report received from off going RN/tech: SADAF Huang assumed care of patient.  POC discussed with patient. Call light within reach, all needs addressed at this time.       Fall risk interventions in place: Patient's personal possessions are with in their safe reach and Keep floor surfaces clean and dry (all applicable per [place] Fall risk assessment)   Continuous monitoring: Cardiac Leads, Pulse Ox, or Blood Pressure  IVF/IV medications: Not Applicable   Oxygen: Room Air  Bedside sitter: Not Applicable   Isolation: Not Applicable

## 2024-05-16 NOTE — ED NOTES
Patient ambulatory to the bathroom with ED tech. Stable gait with 4 wheel walker. Returned to room without issue.

## 2024-05-16 NOTE — ED NOTES
Pt aox4, skin pink warm and dry, airway patent, rr even and unlabored, NAD noted. No new complaints. Pt vss. Pt given discharge instructions without further questions by using interpretor #[id number]. Pt assisted by niece w walker without new incident or distress.

## 2024-05-16 NOTE — ED PROVIDER NOTES
ED Provider Note    CHIEF COMPLAINT  Chief Complaint   Patient presents with    Dizziness     X 2 days intermittent     Earache     EXTERNAL RECORDS REVIEWED  Outpatient note from 5/7/2024 when the patient was seen in cardiology follow-up clinic for blood pressure check following cessation of amlodipine and minoxidil.  Patient's blood pressures appear to be doing well, A1c at that time was 6.4.  Patient has had prior imaging studies, nothing recent.  Has chronic renal disease, currently undergoing outpatient workup.    HPI/ROS  LIMITATION TO HISTORY   Select: Language Yoruba,  Used   OUTSIDE HISTORIAN(S):  none    Debra Stockton is a 84 y.o. female who presents emergency room for evaluation of new onset dizziness over the last 2 days.  Patient says this started spontaneously with somewhat perceived loss of hearing on the right side but describes this is really only being a dullness.  She had some intermittent ringing sensations and says that this developed with some intermittent dizziness/lightheadedness symptoms that were exacerbated with certain positional movements.  She is not having any nauseousness, no vomiting, she is on medications as she has glaucoma and elevated blood pressure but says that her blood pressure logs have not been low.  She denies any headaches, no new vision changes with her chronic glaucoma and says she has been adherent to her medications.  She denies any abdominal pain, no chest pains, no palpitations and no syncope.    PAST MEDICAL HISTORY   has a past medical history of Arthritis, Dental disorder, Glaucoma, Heart burn, Hypertension, Indigestion, Other specified disorder of intestines, Unspecified cataract, Unspecified disorder of thyroid, Unspecified essential hypertension (10/30/2014), and Unspecified hypothyroidism (10/30/2014).    SURGICAL HISTORY   has a past surgical history that includes other abdominal surgery (over 40 yrs ago); eye foreign body removal  (5/20/2014); appendectomy (1996); lid tightening (8/27/2014); lumbar laminectomy diskectomy (Left, 7/17/2015); other (2-3yrs ago); other (2004); other (1952); eye foreign body removal (Left, 1/29/2016); blepharoplasty (Left, 9/7/2016); cervical disk and fusion anterior (N/A, 6/25/2018); and corpectomy (N/A, 6/25/2018).    FAMILY HISTORY  Family History   Problem Relation Age of Onset    Diabetes Mother     Cancer Father     Diabetes Father     Diabetes Brother        SOCIAL HISTORY  Social History     Tobacco Use    Smoking status: Never    Smokeless tobacco: Never   Vaping Use    Vaping status: Never Used   Substance and Sexual Activity    Alcohol use: No    Drug use: No    Sexual activity: Not on file       CURRENT MEDICATIONS  Home Medications       Reviewed by Silvana Golden R.N. (Registered Nurse) on 05/15/24 at 1548  Med List Status: Partial     Medication Last Dose Status   brimonidine (ALPHAGAN) 0.2 % Solution  Active   COMBIGAN 0.2-0.5 % Solution  Active   dorzolamide (TRUSOPT) 2 % Solution  Active   hydroCHLOROthiazide 25 MG Tab  Active   levothyroxine (SYNTHROID) 100 MCG Tab  Active   lidocaine (LIDODERM) 5 % Patch  Active   Loratadine (CLARITIN) 10 MG Cap  Active   meclizine (ANTIVERT) 25 MG Tab  Active   neomycin-polymixin-dexamethasone (MAXITROL) 3.5-56268-2.1 Ointment ophthalmic ointment  Active   NYAMYC powder  Active   olmesartan (BENICAR) 40 MG Tab  Active   timolol (TIMOPTIC) 0.5 % Solution  Active   TRAVATAN Z 0.004 % Solution  Active                    ALLERGIES  No Known Allergies    PHYSICAL EXAM  VITAL SIGNS: BP (!) 205/94   Pulse 88   Temp 36.5 °C (97.7 °F) (Temporal)   Resp 16   Wt 57.6 kg (127 lb)   SpO2 96%   BMI 26.54 kg/m²    Genl: Elderly F sitting in chair comfortably, speaking clearly, appears in no acute distress   Head: NC/AT   ENT: Mucous membranes moist, posterior pharynx clear, uvula midline, nares patent bilaterally.  Left TM is visualized and shows no dullness or  erythema.  Right TM is obstructed by a large cerumen impaction and I am unable to visualize any further structures.  She has no tenderness over the mastoids bilaterally, no skin changes or discharges noted.  Eyes: Normal sclera, pupils equal round reactive to light  Neck: Supple, FROM, no LAD appreciated   Pulmonary: Lungs are clear to auscultation bilaterally  Chest: No TTP  CV:  RRR, no murmur appreciated, pulses 2+ in both upper and lower extremities,  Abdomen: soft, NT/ND; no rebound/guarding, no masses palpated, no HSM   Musculoskeletal: Pain free ROM of the neck. Moving upper and lower extremities in spontaneous and coordinated fashion  Neuro: A&Ox4 (person, place, time, situation), speech fluent, gait not initially assessed, no focal deficits appreciated, No cerebellar signs. Sensation is grossly intact in the distal upper and lower extremities.  5/5 strength in  and dorsiflexion/plantar flexion of the ankles  Skin: No rash or lesions.  No pallor or jaundice.  No cyanosis.  Warm and dry.     EKG/LABS  Results for orders placed or performed during the hospital encounter of 05/15/24   EKG   Result Value Ref Range    Report       [facility] Emergency Dept.    Test Date:  2024-05-15  Pt Name:    SARAY WYNN              Department: ER  MRN:        [id number]                      Room:  Gender:     Female                       Technician: [id number]  :        [id number]                   Requested By:ER TRIAGE PROTOCOL  Order #:    [id number]                    Reading MD: Armando Vega MD    Measurements  Intervals                                Axis  Rate:       84                           P:          50  WA:         150                          QRS:        41  QRSD:       97                           T:          31  QT:         341  QTc:        404    Interpretive Statements  Sinus rhythm, rate of 84, normal intervals and axis, no acute ischemia or  infarction noted.  Similar to prior  dated  08/12/2023 22:58:01  Electronically Signed On 05- 17:08:10 PDT by Armando Vega MD     I have independently interpreted this EKG    Labs Reviewed   BASIC METABOLIC PANEL - Abnormal; Notable for the following components:       Result Value    Bun 25 (*)     All other components within normal limits   CBC WITH DIFFERENTIAL   ESTIMATED GFR       RADIOLOGY/PROCEDURES   none    COURSE & MEDICAL DECISION MAKING    ASSESSMENT, COURSE AND PLAN  Care Narrative:Patient presents emergency room for symptoms as described above.  Patient was having issues with dizziness, some earache on the right side and clinical exam showed a complete cerumen obstruction at that time.  When further pressed she was stating that she forgot to take her blood pressure her medications this morning and she has hypertension.  She does not have new onset blurry vision, her neurological exam shows no discoordination, no focal neurological deficits and she has no developing fevers or concerning features on repeat examination.  Lab work was obtained while the patient had ear irrigation and this shows no electrolyte abnormalities, no concerning anemia and she has been on the monitor with no evidence of ectopy or arrhythmia.  EKG shows no acute ischemia or infarction and I do not believe that her dizziness is the results of any acute cardiac event.  Following administration of meclizine, concurrent administration of her home blood pressure medication and irrigation of the ear have gone back and reassessed the patient.  She has near complete alleviation of her symptoms.  She is able to ambulate at this time, she does not have any dizziness nor does she have any Edmar-Hallpike endings.  With no evidence of cerebellar dysfunction alleviation of the symptoms following irrigation I do not believe that further advanced medical imaging at this time is indicated.  Her blood pressures were slightly upward trending which is what prompted the medication  ministration.  She would prefer to leave and at home where she can take her morning dose but was amenable to taking nighttime dose here in the emergency department.  Overall the patient remained stable, she is given very strict return precautions.    DISPOSITION AND DISCUSSIONS  I have discussed management of the patient with the following physicians and RANJIT's:  none    Discussion of management with other Q or appropriate source(s): None     Escalation of care considered, and ultimately not performed:diagnostic imaging    Barriers to care at this time, including but not limited to: none.     Decision tools and prescription drugs considered including, but not limited to: meclizine.    FINAL DIAGNOSIS  1. Dizziness    2. Impacted cerumen of right ear      Electronically signed by: Gary Roberts M.D., 5/15/2024 5:07 PM

## 2024-06-04 ENCOUNTER — APPOINTMENT (OUTPATIENT)
Dept: VASCULAR LAB | Facility: MEDICAL CENTER | Age: 84
End: 2024-06-04
Payer: MEDICARE

## 2024-06-20 PROCEDURE — RXMED WILLOW AMBULATORY MEDICATION CHARGE: Performed by: CLINICAL NURSE SPECIALIST

## 2024-06-21 ENCOUNTER — PHARMACY VISIT (OUTPATIENT)
Dept: PHARMACY | Facility: MEDICAL CENTER | Age: 84
End: 2024-06-21
Payer: COMMERCIAL

## 2024-07-17 ENCOUNTER — PHARMACY VISIT (OUTPATIENT)
Dept: PHARMACY | Facility: MEDICAL CENTER | Age: 84
End: 2024-07-17
Payer: COMMERCIAL

## 2024-07-17 PROCEDURE — RXMED WILLOW AMBULATORY MEDICATION CHARGE: Performed by: OPHTHALMOLOGY

## 2024-07-17 RX ORDER — DORZOLAMIDE HYDROCHLORIDE AND TIMOLOL MALEATE 20; 5 MG/ML; MG/ML
SOLUTION/ DROPS OPHTHALMIC
Qty: 10 ML | Refills: 4 | OUTPATIENT
Start: 2024-07-17

## 2024-08-13 ENCOUNTER — HOSPITAL ENCOUNTER (OUTPATIENT)
Dept: LAB | Facility: MEDICAL CENTER | Age: 84
End: 2024-08-13
Attending: FAMILY MEDICINE
Payer: MEDICARE

## 2024-08-13 ENCOUNTER — PHARMACY VISIT (OUTPATIENT)
Dept: PHARMACY | Facility: MEDICAL CENTER | Age: 84
End: 2024-08-13
Payer: COMMERCIAL

## 2024-08-13 LAB
ALBUMIN SERPL BCP-MCNC: 4.2 G/DL (ref 3.2–4.9)
ALBUMIN/GLOB SERPL: 1.2 G/DL
ALP SERPL-CCNC: 113 U/L (ref 30–99)
ALT SERPL-CCNC: 24 U/L (ref 2–50)
ANION GAP SERPL CALC-SCNC: 11 MMOL/L (ref 7–16)
AST SERPL-CCNC: 29 U/L (ref 12–45)
BASOPHILS # BLD AUTO: 1 % (ref 0–1.8)
BASOPHILS # BLD: 0.08 K/UL (ref 0–0.12)
BILIRUB SERPL-MCNC: 0.5 MG/DL (ref 0.1–1.5)
BUN SERPL-MCNC: 27 MG/DL (ref 8–22)
CALCIUM ALBUM COR SERPL-MCNC: 9.6 MG/DL (ref 8.5–10.5)
CALCIUM SERPL-MCNC: 9.8 MG/DL (ref 8.5–10.5)
CHLORIDE SERPL-SCNC: 100 MMOL/L (ref 96–112)
CHOLEST SERPL-MCNC: 178 MG/DL (ref 100–199)
CO2 SERPL-SCNC: 25 MMOL/L (ref 20–33)
CREAT SERPL-MCNC: 1.09 MG/DL (ref 0.5–1.4)
EOSINOPHIL # BLD AUTO: 0.44 K/UL (ref 0–0.51)
EOSINOPHIL NFR BLD: 5.8 % (ref 0–6.9)
ERYTHROCYTE [DISTWIDTH] IN BLOOD BY AUTOMATED COUNT: 46.6 FL (ref 35.9–50)
GFR SERPLBLD CREATININE-BSD FMLA CKD-EPI: 51 ML/MIN/1.73 M 2
GLOBULIN SER CALC-MCNC: 3.4 G/DL (ref 1.9–3.5)
GLUCOSE SERPL-MCNC: 118 MG/DL (ref 65–99)
HCT VFR BLD AUTO: 39.6 % (ref 37–47)
HDLC SERPL-MCNC: 69 MG/DL
HGB BLD-MCNC: 12.4 G/DL (ref 12–16)
IMM GRANULOCYTES # BLD AUTO: 0.01 K/UL (ref 0–0.11)
IMM GRANULOCYTES NFR BLD AUTO: 0.1 % (ref 0–0.9)
LDLC SERPL CALC-MCNC: 95 MG/DL
LYMPHOCYTES # BLD AUTO: 3.13 K/UL (ref 1–4.8)
LYMPHOCYTES NFR BLD: 41 % (ref 22–41)
MCH RBC QN AUTO: 27.4 PG (ref 27–33)
MCHC RBC AUTO-ENTMCNC: 31.3 G/DL (ref 32.2–35.5)
MCV RBC AUTO: 87.4 FL (ref 81.4–97.8)
MONOCYTES # BLD AUTO: 0.73 K/UL (ref 0–0.85)
MONOCYTES NFR BLD AUTO: 9.6 % (ref 0–13.4)
NEUTROPHILS # BLD AUTO: 3.25 K/UL (ref 1.82–7.42)
NEUTROPHILS NFR BLD: 42.5 % (ref 44–72)
NRBC # BLD AUTO: 0 K/UL
NRBC BLD-RTO: 0 /100 WBC (ref 0–0.2)
PLATELET # BLD AUTO: 219 K/UL (ref 164–446)
PMV BLD AUTO: 12.8 FL (ref 9–12.9)
POTASSIUM SERPL-SCNC: 4.3 MMOL/L (ref 3.6–5.5)
PROT SERPL-MCNC: 7.6 G/DL (ref 6–8.2)
RBC # BLD AUTO: 4.53 M/UL (ref 4.2–5.4)
SODIUM SERPL-SCNC: 136 MMOL/L (ref 135–145)
T4 FREE SERPL-MCNC: 1.9 NG/DL (ref 0.93–1.7)
TRIGL SERPL-MCNC: 68 MG/DL (ref 0–149)
TSH SERPL-ACNC: 0.91 UIU/ML (ref 0.35–5.5)
WBC # BLD AUTO: 7.6 K/UL (ref 4.8–10.8)

## 2024-08-13 PROCEDURE — 80053 COMPREHEN METABOLIC PANEL: CPT

## 2024-08-13 PROCEDURE — 84439 ASSAY OF FREE THYROXINE: CPT

## 2024-08-13 PROCEDURE — 84443 ASSAY THYROID STIM HORMONE: CPT

## 2024-08-13 PROCEDURE — 85025 COMPLETE CBC W/AUTO DIFF WBC: CPT

## 2024-08-13 PROCEDURE — 80061 LIPID PANEL: CPT

## 2024-08-13 PROCEDURE — RXMED WILLOW AMBULATORY MEDICATION CHARGE: Performed by: FAMILY MEDICINE

## 2024-08-13 PROCEDURE — 36415 COLL VENOUS BLD VENIPUNCTURE: CPT

## 2024-08-20 PROCEDURE — RXMED WILLOW AMBULATORY MEDICATION CHARGE: Performed by: FAMILY MEDICINE

## 2024-08-24 PROCEDURE — RXMED WILLOW AMBULATORY MEDICATION CHARGE: Performed by: FAMILY MEDICINE

## 2024-08-29 ENCOUNTER — PHARMACY VISIT (OUTPATIENT)
Dept: PHARMACY | Facility: MEDICAL CENTER | Age: 84
End: 2024-08-29
Payer: COMMERCIAL

## 2024-09-04 ENCOUNTER — PATIENT MESSAGE (OUTPATIENT)
Dept: HEALTH INFORMATION MANAGEMENT | Facility: OTHER | Age: 84
End: 2024-09-04

## 2024-09-25 ENCOUNTER — HOSPITAL ENCOUNTER (OUTPATIENT)
Dept: LAB | Facility: MEDICAL CENTER | Age: 84
End: 2024-09-25
Attending: NURSE PRACTITIONER
Payer: MEDICARE

## 2024-09-25 DIAGNOSIS — I10 PRIMARY HYPERTENSION: ICD-10-CM

## 2024-09-25 LAB
ANION GAP SERPL CALC-SCNC: 9 MMOL/L (ref 7–16)
BUN SERPL-MCNC: 32 MG/DL (ref 8–22)
CALCIUM SERPL-MCNC: 10.1 MG/DL (ref 8.5–10.5)
CHLORIDE SERPL-SCNC: 101 MMOL/L (ref 96–112)
CO2 SERPL-SCNC: 26 MMOL/L (ref 20–33)
CREAT SERPL-MCNC: 0.88 MG/DL (ref 0.5–1.4)
GFR SERPLBLD CREATININE-BSD FMLA CKD-EPI: 65 ML/MIN/1.73 M 2
GLUCOSE SERPL-MCNC: 116 MG/DL (ref 65–99)
POTASSIUM SERPL-SCNC: 4.1 MMOL/L (ref 3.6–5.5)
SODIUM SERPL-SCNC: 136 MMOL/L (ref 135–145)

## 2024-09-25 PROCEDURE — 36415 COLL VENOUS BLD VENIPUNCTURE: CPT

## 2024-09-25 PROCEDURE — 80048 BASIC METABOLIC PNL TOTAL CA: CPT

## 2024-10-04 ENCOUNTER — OFFICE VISIT (OUTPATIENT)
Dept: VASCULAR LAB | Facility: MEDICAL CENTER | Age: 84
End: 2024-10-04
Attending: FAMILY MEDICINE
Payer: MEDICARE

## 2024-10-04 VITALS
HEIGHT: 58 IN | WEIGHT: 126 LBS | DIASTOLIC BLOOD PRESSURE: 83 MMHG | BODY MASS INDEX: 26.45 KG/M2 | SYSTOLIC BLOOD PRESSURE: 137 MMHG | HEART RATE: 90 BPM

## 2024-10-04 DIAGNOSIS — E03.8 OTHER SPECIFIED HYPOTHYROIDISM: ICD-10-CM

## 2024-10-04 DIAGNOSIS — I87.2 CHRONIC VENOUS INSUFFICIENCY: ICD-10-CM

## 2024-10-04 DIAGNOSIS — R73.03 PREDIABETES: ICD-10-CM

## 2024-10-04 DIAGNOSIS — I67.9 SMALL VESSEL DISEASE, CEREBROVASCULAR: ICD-10-CM

## 2024-10-04 DIAGNOSIS — I10 PRIMARY HYPERTENSION: ICD-10-CM

## 2024-10-04 DIAGNOSIS — Z91.81 RISK FOR FALLS: Chronic | ICD-10-CM

## 2024-10-04 DIAGNOSIS — N18.32 CKD STAGE G3B/A1, GFR 30-44 AND ALBUMIN CREATININE RATIO <30 MG/G: ICD-10-CM

## 2024-10-04 PROCEDURE — 99212 OFFICE O/P EST SF 10 MIN: CPT

## 2024-10-04 PROCEDURE — 99214 OFFICE O/P EST MOD 30 MIN: CPT | Performed by: FAMILY MEDICINE

## 2024-10-04 PROCEDURE — 3079F DIAST BP 80-89 MM HG: CPT | Performed by: FAMILY MEDICINE

## 2024-10-04 PROCEDURE — 3075F SYST BP GE 130 - 139MM HG: CPT | Performed by: FAMILY MEDICINE

## 2024-10-04 ASSESSMENT — FIBROSIS 4 INDEX: FIB4 SCORE: 2.22

## 2024-10-04 ASSESSMENT — ENCOUNTER SYMPTOMS
WHEEZING: 0
PALPITATIONS: 0
PND: 0
SHORTNESS OF BREATH: 0
FEVER: 0
CHILLS: 0
SPUTUM PRODUCTION: 0
COUGH: 0
ORTHOPNEA: 0
CLAUDICATION: 0
HEMOPTYSIS: 0

## 2024-10-24 ENCOUNTER — APPOINTMENT (OUTPATIENT)
Dept: RADIOLOGY | Facility: MEDICAL CENTER | Age: 84
End: 2024-10-24
Attending: FAMILY MEDICINE
Payer: MEDICARE

## 2024-10-26 ENCOUNTER — APPOINTMENT (OUTPATIENT)
Dept: RADIOLOGY | Facility: MEDICAL CENTER | Age: 84
End: 2024-10-26
Attending: STUDENT IN AN ORGANIZED HEALTH CARE EDUCATION/TRAINING PROGRAM
Payer: MEDICARE

## 2024-10-26 ENCOUNTER — HOME HEALTH ADMISSION (OUTPATIENT)
Dept: HOME HEALTH SERVICES | Facility: HOME HEALTHCARE | Age: 84
End: 2024-10-26
Payer: MEDICARE

## 2024-10-26 ENCOUNTER — HOSPITAL ENCOUNTER (OUTPATIENT)
Facility: MEDICAL CENTER | Age: 84
End: 2024-10-26
Attending: EMERGENCY MEDICINE | Admitting: STUDENT IN AN ORGANIZED HEALTH CARE EDUCATION/TRAINING PROGRAM
Payer: MEDICARE

## 2024-10-26 ENCOUNTER — APPOINTMENT (OUTPATIENT)
Dept: RADIOLOGY | Facility: MEDICAL CENTER | Age: 84
End: 2024-10-26
Attending: EMERGENCY MEDICINE
Payer: MEDICARE

## 2024-10-26 ENCOUNTER — APPOINTMENT (OUTPATIENT)
Dept: CARDIOLOGY | Facility: MEDICAL CENTER | Age: 84
End: 2024-10-26
Attending: STUDENT IN AN ORGANIZED HEALTH CARE EDUCATION/TRAINING PROGRAM
Payer: MEDICARE

## 2024-10-26 VITALS
WEIGHT: 130.73 LBS | HEART RATE: 94 BPM | SYSTOLIC BLOOD PRESSURE: 149 MMHG | HEIGHT: 58 IN | OXYGEN SATURATION: 99 % | DIASTOLIC BLOOD PRESSURE: 65 MMHG | TEMPERATURE: 97.8 F | BODY MASS INDEX: 27.44 KG/M2 | RESPIRATION RATE: 18 BRPM

## 2024-10-26 DIAGNOSIS — R51.9 ACUTE NONINTRACTABLE HEADACHE, UNSPECIFIED HEADACHE TYPE: ICD-10-CM

## 2024-10-26 DIAGNOSIS — R55 SYNCOPE, UNSPECIFIED SYNCOPE TYPE: ICD-10-CM

## 2024-10-26 DIAGNOSIS — R42 DIZZINESS: ICD-10-CM

## 2024-10-26 PROBLEM — G45.9 TIA (TRANSIENT ISCHEMIC ATTACK): Status: RESOLVED | Noted: 2024-10-26 | Resolved: 2024-10-26

## 2024-10-26 PROBLEM — N18.9 ACUTE KIDNEY INJURY SUPERIMPOSED ON CHRONIC KIDNEY DISEASE (HCC): Status: ACTIVE | Noted: 2024-10-26

## 2024-10-26 PROBLEM — G45.9 TIA (TRANSIENT ISCHEMIC ATTACK): Status: ACTIVE | Noted: 2024-10-26

## 2024-10-26 PROBLEM — N17.9 ACUTE KIDNEY INJURY SUPERIMPOSED ON CHRONIC KIDNEY DISEASE (HCC): Status: ACTIVE | Noted: 2024-10-26

## 2024-10-26 LAB
ALBUMIN SERPL BCP-MCNC: 4 G/DL (ref 3.2–4.9)
ALBUMIN/GLOB SERPL: 1.3 G/DL
ALP SERPL-CCNC: 115 U/L (ref 30–99)
ALT SERPL-CCNC: 25 U/L (ref 2–50)
ANION GAP SERPL CALC-SCNC: 10 MMOL/L (ref 7–16)
ANION GAP SERPL CALC-SCNC: 13 MMOL/L (ref 7–16)
APPEARANCE UR: CLEAR
AST SERPL-CCNC: 28 U/L (ref 12–45)
BACTERIA #/AREA URNS HPF: ABNORMAL /HPF
BASOPHILS # BLD AUTO: 0.5 % (ref 0–1.8)
BASOPHILS # BLD: 0.04 K/UL (ref 0–0.12)
BILIRUB SERPL-MCNC: 0.2 MG/DL (ref 0.1–1.5)
BILIRUB UR QL STRIP.AUTO: NEGATIVE
BUN SERPL-MCNC: 32 MG/DL (ref 8–22)
BUN SERPL-MCNC: 39 MG/DL (ref 8–22)
CALCIUM ALBUM COR SERPL-MCNC: 9.4 MG/DL (ref 8.5–10.5)
CALCIUM SERPL-MCNC: 9.2 MG/DL (ref 8.5–10.5)
CALCIUM SERPL-MCNC: 9.4 MG/DL (ref 8.5–10.5)
CASTS URNS QL MICRO: ABNORMAL /LPF (ref 0–2)
CHLORIDE SERPL-SCNC: 100 MMOL/L (ref 96–112)
CHLORIDE SERPL-SCNC: 106 MMOL/L (ref 96–112)
CHOLEST SERPL-MCNC: 141 MG/DL (ref 100–199)
CK SERPL-CCNC: 57 U/L (ref 0–154)
CO2 SERPL-SCNC: 22 MMOL/L (ref 20–33)
CO2 SERPL-SCNC: 23 MMOL/L (ref 20–33)
COLOR UR: YELLOW
CORTIS SERPL-MCNC: 4.1 UG/DL (ref 0–23)
CREAT SERPL-MCNC: 1 MG/DL (ref 0.5–1.4)
CREAT SERPL-MCNC: 1.32 MG/DL (ref 0.5–1.4)
EKG IMPRESSION: NORMAL
EOSINOPHIL # BLD AUTO: 0.28 K/UL (ref 0–0.51)
EOSINOPHIL NFR BLD: 3.7 % (ref 0–6.9)
EPITHELIAL CELLS 1715: ABNORMAL /HPF (ref 0–5)
ERYTHROCYTE [DISTWIDTH] IN BLOOD BY AUTOMATED COUNT: 48.2 FL (ref 35.9–50)
EST. AVERAGE GLUCOSE BLD GHB EST-MCNC: 117 MG/DL
ETHANOL BLD-MCNC: <10.1 MG/DL
GFR SERPLBLD CREATININE-BSD FMLA CKD-EPI: 40 ML/MIN/1.73 M 2
GFR SERPLBLD CREATININE-BSD FMLA CKD-EPI: 56 ML/MIN/1.73 M 2
GLOBULIN SER CALC-MCNC: 3.1 G/DL (ref 1.9–3.5)
GLUCOSE SERPL-MCNC: 128 MG/DL (ref 65–99)
GLUCOSE SERPL-MCNC: 128 MG/DL (ref 65–99)
GLUCOSE UR STRIP.AUTO-MCNC: NEGATIVE MG/DL
HBA1C MFR BLD: 5.7 % (ref 4–5.6)
HCT VFR BLD AUTO: 32.3 % (ref 37–47)
HDLC SERPL-MCNC: 64 MG/DL
HGB BLD-MCNC: 10.1 G/DL (ref 12–16)
IMM GRANULOCYTES # BLD AUTO: 0.02 K/UL (ref 0–0.11)
IMM GRANULOCYTES NFR BLD AUTO: 0.3 % (ref 0–0.9)
INR PPP: 1.06 (ref 0.87–1.13)
KETONES UR STRIP.AUTO-MCNC: NEGATIVE MG/DL
LDLC SERPL CALC-MCNC: 65 MG/DL
LEUKOCYTE ESTERASE UR QL STRIP.AUTO: ABNORMAL
LIPASE SERPL-CCNC: 60 U/L (ref 11–82)
LV EJECT FRACT  99904: 62
LV EJECT FRACT MOD 2C 99903: 54.77
LV EJECT FRACT MOD 4C 99902: 67.97
LV EJECT FRACT MOD BP 99901: 62.37
LYMPHOCYTES # BLD AUTO: 3.36 K/UL (ref 1–4.8)
LYMPHOCYTES NFR BLD: 44.2 % (ref 22–41)
MCH RBC QN AUTO: 27.1 PG (ref 27–33)
MCHC RBC AUTO-ENTMCNC: 31.3 G/DL (ref 32.2–35.5)
MCV RBC AUTO: 86.6 FL (ref 81.4–97.8)
MICRO URNS: ABNORMAL
MONOCYTES # BLD AUTO: 0.72 K/UL (ref 0–0.85)
MONOCYTES NFR BLD AUTO: 9.5 % (ref 0–13.4)
NEUTROPHILS # BLD AUTO: 3.18 K/UL (ref 1.82–7.42)
NEUTROPHILS NFR BLD: 41.8 % (ref 44–72)
NITRITE UR QL STRIP.AUTO: NEGATIVE
NRBC # BLD AUTO: 0 K/UL
NRBC BLD-RTO: 0 /100 WBC (ref 0–0.2)
NT-PROBNP SERPL IA-MCNC: 67 PG/ML (ref 0–125)
PH UR STRIP.AUTO: 5 [PH] (ref 5–8)
PLATELET # BLD AUTO: 210 K/UL (ref 164–446)
PLATELETS.RETICULATED NFR BLD AUTO: 5 % (ref 0.6–13.1)
PMV BLD AUTO: 11.1 FL (ref 9–12.9)
POTASSIUM SERPL-SCNC: 3.9 MMOL/L (ref 3.6–5.5)
POTASSIUM SERPL-SCNC: 3.9 MMOL/L (ref 3.6–5.5)
PROT SERPL-MCNC: 7.1 G/DL (ref 6–8.2)
PROT UR QL STRIP: NEGATIVE MG/DL
PROTHROMBIN TIME: 13.8 SEC (ref 12–14.6)
RBC # BLD AUTO: 3.73 M/UL (ref 4.2–5.4)
RBC # URNS HPF: ABNORMAL /HPF (ref 0–2)
RBC UR QL AUTO: NEGATIVE
SODIUM SERPL-SCNC: 136 MMOL/L (ref 135–145)
SODIUM SERPL-SCNC: 138 MMOL/L (ref 135–145)
SP GR UR STRIP.AUTO: 1.01
T4 FREE SERPL-MCNC: 2.04 NG/DL (ref 0.93–1.7)
TRIGL SERPL-MCNC: 62 MG/DL (ref 0–149)
TROPONIN T SERPL-MCNC: 7 NG/L (ref 6–19)
TROPONIN T SERPL-MCNC: 8 NG/L (ref 6–19)
TSH SERPL DL<=0.005 MIU/L-ACNC: 0.04 UIU/ML (ref 0.38–5.33)
UROBILINOGEN UR STRIP.AUTO-MCNC: 1 EU/DL
WBC # BLD AUTO: 7.6 K/UL (ref 4.8–10.8)
WBC #/AREA URNS HPF: ABNORMAL /HPF

## 2024-10-26 PROCEDURE — G0378 HOSPITAL OBSERVATION PER HR: HCPCS

## 2024-10-26 PROCEDURE — 700105 HCHG RX REV CODE 258: Mod: UD | Performed by: STUDENT IN AN ORGANIZED HEALTH CARE EDUCATION/TRAINING PROGRAM

## 2024-10-26 PROCEDURE — 85055 RETICULATED PLATELET ASSAY: CPT

## 2024-10-26 PROCEDURE — 82550 ASSAY OF CK (CPK): CPT

## 2024-10-26 PROCEDURE — 70551 MRI BRAIN STEM W/O DYE: CPT

## 2024-10-26 PROCEDURE — 80053 COMPREHEN METABOLIC PANEL: CPT

## 2024-10-26 PROCEDURE — 97535 SELF CARE MNGMENT TRAINING: CPT

## 2024-10-26 PROCEDURE — 84443 ASSAY THYROID STIM HORMONE: CPT

## 2024-10-26 PROCEDURE — 93005 ELECTROCARDIOGRAM TRACING: CPT

## 2024-10-26 PROCEDURE — 70450 CT HEAD/BRAIN W/O DYE: CPT

## 2024-10-26 PROCEDURE — 82533 TOTAL CORTISOL: CPT

## 2024-10-26 PROCEDURE — 85025 COMPLETE CBC W/AUTO DIFF WBC: CPT

## 2024-10-26 PROCEDURE — 99223 1ST HOSP IP/OBS HIGH 75: CPT | Performed by: STUDENT IN AN ORGANIZED HEALTH CARE EDUCATION/TRAINING PROGRAM

## 2024-10-26 PROCEDURE — 700102 HCHG RX REV CODE 250 W/ 637 OVERRIDE(OP): Mod: UD | Performed by: STUDENT IN AN ORGANIZED HEALTH CARE EDUCATION/TRAINING PROGRAM

## 2024-10-26 PROCEDURE — 90662 IIV NO PRSV INCREASED AG IM: CPT | Performed by: NURSE PRACTITIONER

## 2024-10-26 PROCEDURE — 83880 ASSAY OF NATRIURETIC PEPTIDE: CPT

## 2024-10-26 PROCEDURE — 85610 PROTHROMBIN TIME: CPT

## 2024-10-26 PROCEDURE — 82077 ASSAY SPEC XCP UR&BREATH IA: CPT

## 2024-10-26 PROCEDURE — 97163 PT EVAL HIGH COMPLEX 45 MIN: CPT

## 2024-10-26 PROCEDURE — 97166 OT EVAL MOD COMPLEX 45 MIN: CPT

## 2024-10-26 PROCEDURE — 84484 ASSAY OF TROPONIN QUANT: CPT | Mod: 91

## 2024-10-26 PROCEDURE — 99285 EMERGENCY DEPT VISIT HI MDM: CPT

## 2024-10-26 PROCEDURE — 700111 HCHG RX REV CODE 636 W/ 250 OVERRIDE (IP): Performed by: NURSE PRACTITIONER

## 2024-10-26 PROCEDURE — A9270 NON-COVERED ITEM OR SERVICE: HCPCS | Mod: UD | Performed by: STUDENT IN AN ORGANIZED HEALTH CARE EDUCATION/TRAINING PROGRAM

## 2024-10-26 PROCEDURE — 80048 BASIC METABOLIC PNL TOTAL CA: CPT

## 2024-10-26 PROCEDURE — 84439 ASSAY OF FREE THYROXINE: CPT

## 2024-10-26 PROCEDURE — 83036 HEMOGLOBIN GLYCOSYLATED A1C: CPT

## 2024-10-26 PROCEDURE — 83690 ASSAY OF LIPASE: CPT

## 2024-10-26 PROCEDURE — 81015 MICROSCOPIC EXAM OF URINE: CPT

## 2024-10-26 PROCEDURE — 93306 TTE W/DOPPLER COMPLETE: CPT | Mod: 26 | Performed by: INTERNAL MEDICINE

## 2024-10-26 PROCEDURE — 92610 EVALUATE SWALLOWING FUNCTION: CPT

## 2024-10-26 PROCEDURE — 90471 IMMUNIZATION ADMIN: CPT

## 2024-10-26 PROCEDURE — 93306 TTE W/DOPPLER COMPLETE: CPT

## 2024-10-26 PROCEDURE — 81003 URINALYSIS AUTO W/O SCOPE: CPT

## 2024-10-26 PROCEDURE — 80061 LIPID PANEL: CPT

## 2024-10-26 PROCEDURE — 36415 COLL VENOUS BLD VENIPUNCTURE: CPT

## 2024-10-26 PROCEDURE — 87086 URINE CULTURE/COLONY COUNT: CPT

## 2024-10-26 PROCEDURE — 93005 ELECTROCARDIOGRAM TRACING: CPT | Performed by: EMERGENCY MEDICINE

## 2024-10-26 RX ORDER — POLYETHYLENE GLYCOL 3350 17 G/17G
1 POWDER, FOR SOLUTION ORAL
Status: DISCONTINUED | OUTPATIENT
Start: 2024-10-26 | End: 2024-10-26 | Stop reason: HOSPADM

## 2024-10-26 RX ORDER — ASPIRIN 81 MG/1
81 TABLET, CHEWABLE ORAL DAILY
Status: DISCONTINUED | OUTPATIENT
Start: 2024-10-26 | End: 2024-10-26 | Stop reason: HOSPADM

## 2024-10-26 RX ORDER — ACETAMINOPHEN 325 MG/1
650 TABLET ORAL EVERY 6 HOURS PRN
Status: DISCONTINUED | OUTPATIENT
Start: 2024-10-26 | End: 2024-10-26 | Stop reason: HOSPADM

## 2024-10-26 RX ORDER — AMOXICILLIN 250 MG
2 CAPSULE ORAL EVERY EVENING
Status: DISCONTINUED | OUTPATIENT
Start: 2024-10-26 | End: 2024-10-26 | Stop reason: HOSPADM

## 2024-10-26 RX ORDER — MECLIZINE HYDROCHLORIDE 25 MG/1
25 TABLET ORAL 3 TIMES DAILY PRN
Status: DISCONTINUED | OUTPATIENT
Start: 2024-10-26 | End: 2024-10-26 | Stop reason: HOSPADM

## 2024-10-26 RX ORDER — ASPIRIN 300 MG/1
300 SUPPOSITORY RECTAL DAILY
Status: DISCONTINUED | OUTPATIENT
Start: 2024-10-26 | End: 2024-10-26 | Stop reason: HOSPADM

## 2024-10-26 RX ORDER — ONDANSETRON 2 MG/ML
4 INJECTION INTRAMUSCULAR; INTRAVENOUS EVERY 4 HOURS PRN
Status: DISCONTINUED | OUTPATIENT
Start: 2024-10-26 | End: 2024-10-26 | Stop reason: HOSPADM

## 2024-10-26 RX ORDER — LEVOTHYROXINE SODIUM 50 UG/1
100 TABLET ORAL
Status: DISCONTINUED | OUTPATIENT
Start: 2024-10-26 | End: 2024-10-26 | Stop reason: HOSPADM

## 2024-10-26 RX ORDER — ATORVASTATIN CALCIUM 20 MG/1
20 TABLET, FILM COATED ORAL EVERY EVENING
Status: DISCONTINUED | OUTPATIENT
Start: 2024-10-26 | End: 2024-10-26 | Stop reason: HOSPADM

## 2024-10-26 RX ORDER — IPRATROPIUM BROMIDE AND ALBUTEROL SULFATE 2.5; .5 MG/3ML; MG/3ML
3 SOLUTION RESPIRATORY (INHALATION)
Status: DISCONTINUED | OUTPATIENT
Start: 2024-10-26 | End: 2024-10-26 | Stop reason: HOSPADM

## 2024-10-26 RX ORDER — ONDANSETRON 4 MG/1
4 TABLET, ORALLY DISINTEGRATING ORAL EVERY 4 HOURS PRN
Status: DISCONTINUED | OUTPATIENT
Start: 2024-10-26 | End: 2024-10-26 | Stop reason: HOSPADM

## 2024-10-26 RX ORDER — BUTALBITAL, ACETAMINOPHEN AND CAFFEINE 50; 325; 40 MG/1; MG/1; MG/1
1 TABLET ORAL EVERY 6 HOURS PRN
Status: DISCONTINUED | OUTPATIENT
Start: 2024-10-26 | End: 2024-10-26 | Stop reason: HOSPADM

## 2024-10-26 RX ORDER — SODIUM CHLORIDE 9 MG/ML
500 INJECTION, SOLUTION INTRAVENOUS CONTINUOUS
Status: DISCONTINUED | OUTPATIENT
Start: 2024-10-26 | End: 2024-10-26 | Stop reason: HOSPADM

## 2024-10-26 RX ORDER — ATORVASTATIN CALCIUM 40 MG/1
40 TABLET, FILM COATED ORAL EVERY EVENING
Status: DISCONTINUED | OUTPATIENT
Start: 2024-10-26 | End: 2024-10-26

## 2024-10-26 RX ORDER — GUAIFENESIN/DEXTROMETHORPHAN 100-10MG/5
10 SYRUP ORAL EVERY 6 HOURS PRN
Status: DISCONTINUED | OUTPATIENT
Start: 2024-10-26 | End: 2024-10-26 | Stop reason: HOSPADM

## 2024-10-26 RX ORDER — LABETALOL HYDROCHLORIDE 5 MG/ML
10 INJECTION, SOLUTION INTRAVENOUS EVERY 4 HOURS PRN
Status: DISCONTINUED | OUTPATIENT
Start: 2024-10-26 | End: 2024-10-26 | Stop reason: HOSPADM

## 2024-10-26 RX ORDER — LORAZEPAM 1 MG/1
1 TABLET ORAL
Status: DISCONTINUED | OUTPATIENT
Start: 2024-10-26 | End: 2024-10-26 | Stop reason: HOSPADM

## 2024-10-26 RX ADMIN — ASPIRIN 81 MG: 81 TABLET, CHEWABLE ORAL at 05:13

## 2024-10-26 RX ADMIN — SODIUM CHLORIDE 500 ML: 9 INJECTION, SOLUTION INTRAVENOUS at 11:02

## 2024-10-26 RX ADMIN — INFLUENZA A VIRUS A/VICTORIA/4897/2022 IVR-238 (H1N1) ANTIGEN (FORMALDEHYDE INACTIVATED), INFLUENZA A VIRUS A/CALIFORNIA/122/2022 SAN-022 (H3N2) ANTIGEN (FORMALDEHYDE INACTIVATED), AND INFLUENZA B VIRUS B/MICHIGAN/01/2021 ANTIGEN (FORMALDEHYDE INACTIVATED) 0.5 ML: 60; 60; 60 INJECTION, SUSPENSION INTRAMUSCULAR at 14:17

## 2024-10-26 SDOH — ECONOMIC STABILITY: TRANSPORTATION INSECURITY
IN THE PAST 12 MONTHS, HAS THE LACK OF TRANSPORTATION KEPT YOU FROM MEDICAL APPOINTMENTS OR FROM GETTING MEDICATIONS?: PATIENT DECLINED

## 2024-10-26 SDOH — ECONOMIC STABILITY: TRANSPORTATION INSECURITY
IN THE PAST 12 MONTHS, HAS LACK OF RELIABLE TRANSPORTATION KEPT YOU FROM MEDICAL APPOINTMENTS, MEETINGS, WORK OR FROM GETTING THINGS NEEDED FOR DAILY LIVING?: PATIENT DECLINED

## 2024-10-26 ASSESSMENT — SOCIAL DETERMINANTS OF HEALTH (SDOH)
WITHIN THE LAST YEAR, HAVE YOU BEEN KICKED, HIT, SLAPPED, OR OTHERWISE PHYSICALLY HURT BY YOUR PARTNER OR EX-PARTNER?: PATIENT DECLINED
WITHIN THE PAST 12 MONTHS, YOU WORRIED THAT YOUR FOOD WOULD RUN OUT BEFORE YOU GOT THE MONEY TO BUY MORE: PATIENT DECLINED
WITHIN THE LAST YEAR, HAVE YOU BEEN HUMILIATED OR EMOTIONALLY ABUSED IN OTHER WAYS BY YOUR PARTNER OR EX-PARTNER?: PATIENT DECLINED
WITHIN THE LAST YEAR, HAVE TO BEEN RAPED OR FORCED TO HAVE ANY KIND OF SEXUAL ACTIVITY BY YOUR PARTNER OR EX-PARTNER?: PATIENT DECLINED
WITHIN THE LAST YEAR, HAVE YOU BEEN AFRAID OF YOUR PARTNER OR EX-PARTNER?: PATIENT DECLINED
WITHIN THE PAST 12 MONTHS, THE FOOD YOU BOUGHT JUST DIDN'T LAST AND YOU DIDN'T HAVE MONEY TO GET MORE: PATIENT DECLINED
IN THE PAST 12 MONTHS, HAS THE ELECTRIC, GAS, OIL, OR WATER COMPANY THREATENED TO SHUT OFF SERVICE IN YOUR HOME?: PATIENT DECLINED

## 2024-10-26 ASSESSMENT — PATIENT HEALTH QUESTIONNAIRE - PHQ9
9. THOUGHTS THAT YOU WOULD BE BETTER OFF DEAD, OR OF HURTING YOURSELF: NOT AT ALL
3. TROUBLE FALLING OR STAYING ASLEEP OR SLEEPING TOO MUCH: MORE THAN HALF THE DAYS
7. TROUBLE CONCENTRATING ON THINGS, SUCH AS READING THE NEWSPAPER OR WATCHING TELEVISION: NOT AT ALL
5. POOR APPETITE OR OVEREATING: NOT AT ALL
4. FEELING TIRED OR HAVING LITTLE ENERGY: MORE THAN HALF THE DAYS
1. LITTLE INTEREST OR PLEASURE IN DOING THINGS: SEVERAL DAYS
SUM OF ALL RESPONSES TO PHQ QUESTIONS 1-9: 6
6. FEELING BAD ABOUT YOURSELF - OR THAT YOU ARE A FAILURE OR HAVE LET YOURSELF OR YOUR FAMILY DOWN: NOT AL ALL
2. FEELING DOWN, DEPRESSED, IRRITABLE, OR HOPELESS: SEVERAL DAYS
SUM OF ALL RESPONSES TO PHQ9 QUESTIONS 1 AND 2: 2
8. MOVING OR SPEAKING SO SLOWLY THAT OTHER PEOPLE COULD HAVE NOTICED. OR THE OPPOSITE, BEING SO FIGETY OR RESTLESS THAT YOU HAVE BEEN MOVING AROUND A LOT MORE THAN USUAL: NOT AT ALL

## 2024-10-26 ASSESSMENT — ENCOUNTER SYMPTOMS
DIZZINESS: 1
FALLS: 1
SINUS PAIN: 0
WEAKNESS: 1
ABDOMINAL PAIN: 0
BRUISES/BLEEDS EASILY: 0
CHILLS: 0
WHEEZING: 0
FEVER: 0
CLAUDICATION: 0
VOMITING: 0
NAUSEA: 0
FOCAL WEAKNESS: 0
SHORTNESS OF BREATH: 0
DOUBLE VISION: 0
COUGH: 0
ORTHOPNEA: 0
PALPITATIONS: 0
HEMOPTYSIS: 0
SORE THROAT: 0
PSYCHIATRIC NEGATIVE: 1
HEADACHES: 1
LOSS OF CONSCIOUSNESS: 1
MYALGIAS: 0
BLURRED VISION: 1
BLURRED VISION: 0
HEARTBURN: 0
DEPRESSION: 0

## 2024-10-26 ASSESSMENT — COGNITIVE AND FUNCTIONAL STATUS - GENERAL
MOVING TO AND FROM BED TO CHAIR: A LITTLE
EATING MEALS: A LITTLE
DRESSING REGULAR LOWER BODY CLOTHING: A LITTLE
MOBILITY SCORE: 20
SUGGESTED CMS G CODE MODIFIER MOBILITY: CJ
HELP NEEDED FOR BATHING: A LITTLE
WALKING IN HOSPITAL ROOM: A LITTLE
CLIMB 3 TO 5 STEPS WITH RAILING: A LITTLE
SUGGESTED CMS G CODE MODIFIER DAILY ACTIVITY: CJ
TOILETING: A LITTLE
DAILY ACTIVITIY SCORE: 20
STANDING UP FROM CHAIR USING ARMS: A LITTLE

## 2024-10-26 ASSESSMENT — FIBROSIS 4 INDEX
FIB4 SCORE: 2.22
FIB4 SCORE: 2.19

## 2024-10-26 ASSESSMENT — LIFESTYLE VARIABLES
TOTAL SCORE: 0
TOTAL SCORE: 0
HAVE YOU EVER FELT YOU SHOULD CUT DOWN ON YOUR DRINKING: NO
HAVE PEOPLE ANNOYED YOU BY CRITICIZING YOUR DRINKING: NO
AVERAGE NUMBER OF DAYS PER WEEK YOU HAVE A DRINK CONTAINING ALCOHOL: 0
ALCOHOL_USE: NO
HOW MANY TIMES IN THE PAST YEAR HAVE YOU HAD 5 OR MORE DRINKS IN A DAY: 0
ON A TYPICAL DAY WHEN YOU DRINK ALCOHOL HOW MANY DRINKS DO YOU HAVE: 0
EVER FELT BAD OR GUILTY ABOUT YOUR DRINKING: NO
CONSUMPTION TOTAL: NEGATIVE
EVER HAD A DRINK FIRST THING IN THE MORNING TO STEADY YOUR NERVES TO GET RID OF A HANGOVER: NO
TOTAL SCORE: 0
SUBSTANCE_ABUSE: 0
DOES PATIENT WANT TO STOP DRINKING: NO

## 2024-10-26 ASSESSMENT — GAIT ASSESSMENTS
DEVIATION: ANTALGIC
GAIT LEVEL OF ASSIST: STANDBY ASSIST
ASSISTIVE DEVICE: 4 WHEEL WALKER
DISTANCE (FEET): 200

## 2024-10-26 ASSESSMENT — ACTIVITIES OF DAILY LIVING (ADL): TOILETING: INDEPENDENT

## 2024-10-28 ENCOUNTER — HOSPITAL ENCOUNTER (OUTPATIENT)
Dept: RADIOLOGY | Facility: MEDICAL CENTER | Age: 84
End: 2024-10-28
Attending: FAMILY MEDICINE
Payer: MEDICARE

## 2024-10-28 DIAGNOSIS — Z12.31 VISIT FOR SCREENING MAMMOGRAM: ICD-10-CM

## 2024-10-28 LAB
BACTERIA UR CULT: NORMAL
SIGNIFICANT IND 70042: NORMAL
SITE SITE: NORMAL
SOURCE SOURCE: NORMAL

## 2024-10-28 PROCEDURE — 77067 SCR MAMMO BI INCL CAD: CPT

## 2024-10-30 ENCOUNTER — HOME CARE VISIT (OUTPATIENT)
Dept: HOME HEALTH SERVICES | Facility: HOME HEALTHCARE | Age: 84
End: 2024-10-30
Payer: MEDICARE

## 2024-10-30 VITALS
SYSTOLIC BLOOD PRESSURE: 126 MMHG | DIASTOLIC BLOOD PRESSURE: 66 MMHG | HEART RATE: 78 BPM | HEIGHT: 58 IN | TEMPERATURE: 97.4 F | RESPIRATION RATE: 16 BRPM | BODY MASS INDEX: 27.32 KG/M2 | OXYGEN SATURATION: 99 %

## 2024-10-30 PROCEDURE — G0299 HHS/HOSPICE OF RN EA 15 MIN: HCPCS

## 2024-10-30 PROCEDURE — 665005 NO-PAY RAP - HOME HEALTH

## 2024-10-30 ASSESSMENT — ACTIVITIES OF DAILY LIVING (ADL): OASIS_M1830: 02

## 2024-10-30 ASSESSMENT — ENCOUNTER SYMPTOMS
PAIN LOCATION: LOWER BACK
LAST BOWEL MOVEMENT: 67142
PAIN LOCATION - RELIEVING FACTORS: PAIN MEDICATION
PAIN: 1
DIZZINESS: 1
SUBJECTIVE PAIN PROGRESSION: UNCHANGED
PAIN LOCATION - PAIN QUALITY: NUMBNESS
BOWEL PATTERN NORMAL: 1
LOWEST PAIN SEVERITY IN PAST 24 HOURS: 4/10
PAIN LOCATION - PAIN FREQUENCY: INTERMITTENT
HIGHEST PAIN SEVERITY IN PAST 24 HOURS: 4/10
PAIN SEVERITY GOAL: 3/10

## 2024-10-31 ENCOUNTER — DOCUMENTATION (OUTPATIENT)
Dept: MEDICAL GROUP | Facility: PHYSICIAN GROUP | Age: 84
End: 2024-10-31
Payer: MEDICARE

## 2024-10-31 ENCOUNTER — HOME CARE VISIT (OUTPATIENT)
Dept: HOME HEALTH SERVICES | Facility: HOME HEALTHCARE | Age: 84
End: 2024-10-31
Payer: MEDICARE

## 2024-10-31 VITALS
OXYGEN SATURATION: 97 % | TEMPERATURE: 98.7 F | DIASTOLIC BLOOD PRESSURE: 52 MMHG | SYSTOLIC BLOOD PRESSURE: 98 MMHG | HEART RATE: 76 BPM | RESPIRATION RATE: 14 BRPM

## 2024-10-31 PROCEDURE — G0151 HHCP-SERV OF PT,EA 15 MIN: HCPCS

## 2024-10-31 ASSESSMENT — ACTIVITIES OF DAILY LIVING (ADL): AMBULATION_DISTANCE/DURATION_TOLERATED: 30 FEET

## 2024-10-31 ASSESSMENT — ENCOUNTER SYMPTOMS
PAIN LOCATION: BACK
DENIES PAIN: 1
PAIN SEVERITY GOAL: 0/10
HIGHEST PAIN SEVERITY IN PAST 24 HOURS: 4/10
DEBILITATING PAIN: 1
PAIN LOCATION - RELIEVING FACTORS: REST, MEDICATION
LOWEST PAIN SEVERITY IN PAST 24 HOURS: 0/10
SUBJECTIVE PAIN PROGRESSION: WAXING AND WANING
PAIN LOCATION - EXACERBATING FACTORS: SIT/STAND
PAIN: PAIN LIMITS FUNCTION DAILY NOT CONSTANT
PERSON REPORTING PAIN: PATIENT

## 2024-11-04 ENCOUNTER — HOME CARE VISIT (OUTPATIENT)
Dept: HOME HEALTH SERVICES | Facility: HOME HEALTHCARE | Age: 84
End: 2024-11-04
Payer: MEDICARE

## 2024-11-04 VITALS
TEMPERATURE: 98.4 F | HEART RATE: 84 BPM | RESPIRATION RATE: 16 BRPM | DIASTOLIC BLOOD PRESSURE: 50 MMHG | SYSTOLIC BLOOD PRESSURE: 98 MMHG | OXYGEN SATURATION: 97 %

## 2024-11-04 PROCEDURE — G0151 HHCP-SERV OF PT,EA 15 MIN: HCPCS

## 2024-11-04 ASSESSMENT — ENCOUNTER SYMPTOMS
PERSON REPORTING PAIN: PATIENT
SUBJECTIVE PAIN PROGRESSION: WAXING AND WANING
HIGHEST PAIN SEVERITY IN PAST 24 HOURS: 4/10
LOWEST PAIN SEVERITY IN PAST 24 HOURS: 0/10
PAIN: PAIN DOES NOT LIMIT
PAIN LOCATION - EXACERBATING FACTORS: MOVEMENT
PAIN LOCATION: GENERALIZED
PAIN SEVERITY GOAL: 2/10
PAIN LOCATION - PAIN DURATION: DAILY
PAIN LOCATION - PAIN SEVERITY: 4/10
PAIN LOCATION - PAIN FREQUENCY: WITH ACTIVITY
PAIN LOCATION - PAIN QUALITY: ACHE
PAIN: 1

## 2024-11-06 ENCOUNTER — HOME CARE VISIT (OUTPATIENT)
Dept: HOME HEALTH SERVICES | Facility: HOME HEALTHCARE | Age: 84
End: 2024-11-06
Payer: MEDICARE

## 2024-11-06 PROCEDURE — G0151 HHCP-SERV OF PT,EA 15 MIN: HCPCS

## 2024-11-07 VITALS
TEMPERATURE: 98.7 F | SYSTOLIC BLOOD PRESSURE: 120 MMHG | OXYGEN SATURATION: 92 % | DIASTOLIC BLOOD PRESSURE: 60 MMHG | RESPIRATION RATE: 18 BRPM | HEART RATE: 76 BPM

## 2024-11-07 ASSESSMENT — ENCOUNTER SYMPTOMS
LOWEST PAIN SEVERITY IN PAST 24 HOURS: 0/10
PAIN LOCATION - PAIN QUALITY: ACHE
PAIN SEVERITY GOAL: 0/10
PAIN LOCATION - PAIN DURATION: DAILY
PAIN LOCATION: BACK
PAIN LOCATION - EXACERBATING FACTORS: FIRST THING IN AM
DENIES PAIN: 1
PERSON REPORTING PAIN: PATIENT
HIGHEST PAIN SEVERITY IN PAST 24 HOURS: 4/10
PAIN: PAIN DOES NOT LIMIT

## 2024-11-11 ENCOUNTER — HOME CARE VISIT (OUTPATIENT)
Dept: HOME HEALTH SERVICES | Facility: HOME HEALTHCARE | Age: 84
End: 2024-11-11
Payer: MEDICARE

## 2024-11-11 NOTE — CASE COMMUNICATION
Quality Review Completed for SOC OASIS by SHIMON Jean Baptiste RN on 11/11/2024:     Edits completed by SHIMON Jean Baptiste RN:     1.  and  diagnosis coding updated per chart review  2.  changed to 10/30/24 for LSOC ordered  3.  is also MILA FFS #3  4.  is NA, last hospital visit was observation  5. , 1810, 1820, 1845 changed to 2 per narrative mod level of assist  6. Added details to 485 form for F2F encounter Lashay Gleason M.D. on 10/26/2024   7.  changed to 7 per therapy

## 2024-11-12 NOTE — CASE COMMUNICATION
I agree with these changes.  ----- Message -----  From: Luann Jean Baptiste R.N.  Sent: 11/11/2024   2:05 PM PST  To: Anabel Walters R.N.      Quality Review Completed for SOC OASIS by SHIMON Jean Baptiste, RN on 11/11/2024:     Edits completed by SHIMON Jean Baptiste RN:     1.  and  diagnosis coding updated per chart review  2.  changed to 10/30/24 for LSOC ordered  3.  is also MILA FFS #3  4.  is NA, last hospital visit was observat ion  5. , 1810, 1820, 1845 changed to 2 per narrative mod level of assist  6. Added details to 485 form for F2F encounter Yovanny Gleason M.D. on 10/26/2024   7.  changed to 7 per therapy

## 2024-11-13 ENCOUNTER — HOSPITAL ENCOUNTER (OUTPATIENT)
Dept: LAB | Facility: MEDICAL CENTER | Age: 84
End: 2024-11-13
Attending: FAMILY MEDICINE
Payer: MEDICARE

## 2024-11-13 ENCOUNTER — HOME CARE VISIT (OUTPATIENT)
Dept: HOME HEALTH SERVICES | Facility: HOME HEALTHCARE | Age: 84
End: 2024-11-13
Payer: MEDICARE

## 2024-11-13 LAB
ALBUMIN SERPL BCP-MCNC: 4 G/DL (ref 3.2–4.9)
ALBUMIN/GLOB SERPL: 1.1 G/DL
ALP SERPL-CCNC: 109 U/L (ref 30–99)
ALT SERPL-CCNC: 31 U/L (ref 2–50)
ANION GAP SERPL CALC-SCNC: 11 MMOL/L (ref 7–16)
AST SERPL-CCNC: 53 U/L (ref 12–45)
BILIRUB SERPL-MCNC: 0.4 MG/DL (ref 0.1–1.5)
BUN SERPL-MCNC: 20 MG/DL (ref 8–22)
CALCIUM ALBUM COR SERPL-MCNC: 10.2 MG/DL (ref 8.5–10.5)
CALCIUM SERPL-MCNC: 10.2 MG/DL (ref 8.5–10.5)
CHLORIDE SERPL-SCNC: 102 MMOL/L (ref 96–112)
CO2 SERPL-SCNC: 24 MMOL/L (ref 20–33)
CREAT SERPL-MCNC: 0.87 MG/DL (ref 0.5–1.4)
EST. AVERAGE GLUCOSE BLD GHB EST-MCNC: 137 MG/DL
GFR SERPLBLD CREATININE-BSD FMLA CKD-EPI: 66 ML/MIN/1.73 M 2
GLOBULIN SER CALC-MCNC: 3.7 G/DL (ref 1.9–3.5)
GLUCOSE SERPL-MCNC: 108 MG/DL (ref 65–99)
HBA1C MFR BLD: 6.4 % (ref 4–5.6)
POTASSIUM SERPL-SCNC: 4.7 MMOL/L (ref 3.6–5.5)
PROT SERPL-MCNC: 7.7 G/DL (ref 6–8.2)
SODIUM SERPL-SCNC: 137 MMOL/L (ref 135–145)

## 2024-11-13 PROCEDURE — 36415 COLL VENOUS BLD VENIPUNCTURE: CPT

## 2024-11-13 PROCEDURE — 80053 COMPREHEN METABOLIC PANEL: CPT

## 2024-11-13 PROCEDURE — 83036 HEMOGLOBIN GLYCOSYLATED A1C: CPT

## 2024-11-14 ENCOUNTER — HOME CARE VISIT (OUTPATIENT)
Dept: HOME HEALTH SERVICES | Facility: HOME HEALTHCARE | Age: 84
End: 2024-11-14
Payer: MEDICARE

## 2024-11-15 ENCOUNTER — HOME CARE VISIT (OUTPATIENT)
Dept: HOME HEALTH SERVICES | Facility: HOME HEALTHCARE | Age: 84
End: 2024-11-15
Payer: MEDICARE

## 2024-11-15 VITALS
TEMPERATURE: 98.7 F | RESPIRATION RATE: 16 BRPM | OXYGEN SATURATION: 98 % | DIASTOLIC BLOOD PRESSURE: 70 MMHG | SYSTOLIC BLOOD PRESSURE: 120 MMHG | HEART RATE: 80 BPM

## 2024-11-15 PROCEDURE — G0151 HHCP-SERV OF PT,EA 15 MIN: HCPCS

## 2024-11-15 ASSESSMENT — ENCOUNTER SYMPTOMS
LOWEST PAIN SEVERITY IN PAST 24 HOURS: 0/10
HIGHEST PAIN SEVERITY IN PAST 24 HOURS: 4/10
SUBJECTIVE PAIN PROGRESSION: WAXING AND WANING
DENIES PAIN: 1
PAIN LOCATION: NECK
PAIN: PAIN DOES NOT LIMIT

## 2024-11-18 ENCOUNTER — HOME CARE VISIT (OUTPATIENT)
Dept: HOME HEALTH SERVICES | Facility: HOME HEALTHCARE | Age: 84
End: 2024-11-18
Payer: MEDICARE

## 2024-11-18 VITALS
RESPIRATION RATE: 14 BRPM | SYSTOLIC BLOOD PRESSURE: 92 MMHG | OXYGEN SATURATION: 94 % | TEMPERATURE: 98.5 F | DIASTOLIC BLOOD PRESSURE: 52 MMHG | HEART RATE: 88 BPM

## 2024-11-18 PROCEDURE — G0151 HHCP-SERV OF PT,EA 15 MIN: HCPCS

## 2024-11-18 ASSESSMENT — ENCOUNTER SYMPTOMS
LOWEST PAIN SEVERITY IN PAST 24 HOURS: 0/10
PERSON REPORTING PAIN: PATIENT
PAIN LOCATION: NECK
SUBJECTIVE PAIN PROGRESSION: WAXING AND WANING
PAIN SEVERITY GOAL: 0/10
DENIES PAIN: 1
HIGHEST PAIN SEVERITY IN PAST 24 HOURS: 2/10

## 2024-11-20 ENCOUNTER — HOME CARE VISIT (OUTPATIENT)
Dept: HOME HEALTH SERVICES | Facility: HOME HEALTHCARE | Age: 84
End: 2024-11-20
Payer: MEDICARE

## 2024-11-20 VITALS
DIASTOLIC BLOOD PRESSURE: 74 MMHG | TEMPERATURE: 98.3 F | RESPIRATION RATE: 18 BRPM | OXYGEN SATURATION: 99 % | HEART RATE: 80 BPM | SYSTOLIC BLOOD PRESSURE: 118 MMHG

## 2024-11-20 PROCEDURE — G0151 HHCP-SERV OF PT,EA 15 MIN: HCPCS

## 2024-11-20 ASSESSMENT — ENCOUNTER SYMPTOMS
PAIN: 1
PAIN LOCATION: RIGHT FOOT
PAIN LOCATION - PAIN FREQUENCY: WITH ACTIVITY
HIGHEST PAIN SEVERITY IN PAST 24 HOURS: 4/10
PAIN LOCATION - PAIN QUALITY: BURNING
PAIN LOCATION - RELIEVING FACTORS: UNLOADING
PAIN LOCATION - EXACERBATING FACTORS: WALKING
LOWEST PAIN SEVERITY IN PAST 24 HOURS: 0/10
PERSON REPORTING PAIN: PATIENT
SUBJECTIVE PAIN PROGRESSION: WAXING AND WANING
PAIN LOCATION - PAIN SEVERITY: 4/10

## 2024-11-20 NOTE — Clinical Note
Noted.  ----- Message -----  From: Melissa Bergman, PT  Sent: 11/20/2024   7:07 PM PST  To: Familia Perkins; Leydi Sauer; *      Leydi please send to Dr Herson Garcia P.T. reassessment performed on 11/202/2024 and I will continue 2 times a week for 4 weeks to try to achieve pt's goal of  ambulating in home without a device. Further insurance auth my be required. Thanks

## 2024-11-20 NOTE — Clinical Note
Leydi please send to Dr Herson Garcia    PLARA reassessment performed on 11/202/2024 and I will continue 2 times a week for 4 weeks to try to achieve pt's goal of  ambulating in home without a device. Further insurance auth my be required. Thanks

## 2024-11-21 NOTE — HOME HEALTH
PHYSICAL THERAPY EVALUATION AND PLAN OF CARE     ·       Patient: Debra Stockton     ·       Patient has made gains with skilled physical therapy treatment.  She has met her goals for fall prevention, immediate standing balance, HEP performance and lower extremity strength.  Due to her decreased vision and decreased activity tolerance she has not met her goals for ambulating outside of her apartment but she states this is no longer her priority.  Patient would like to be able to walk within her apartment independently without an assistive device thus would like to continue skilled physical therapy services.  Patient has been excellent in her follow-through with instructions in exercise and safety so therapist feels patient would benefit from further skilled physical therapy services to try to attain her new goal.      ·       Skilled Therapeutic Need: Decreased activity tolerance, LE weakness, Balance control, Generalized deconditioning, Gait training and Fall prevention     Recommend skilled HHPT to address deficits and improve function-report sent to MD     ·       Frequency:   2 times a week for 4 weeks,  Effective 11/24/2024     ·       Goals: Patient/caregivers will continue to verbalize understanding of home safety strategies will fall preventions and fall recovery related to altered mobility by 8 visits.   Patient will demonstrate improved immediate standing balance to level of at least good by 8 visits.   Patient/caregivers will continue to demonstrate understanding at independent level of home exercise program including up grades by 8 visits.  Patient will demonstrate ability to ambulate at least 100 feet in apartment building using device as needed at no more than supervision level to enable limited community access by 8 visits.  Patient will demonstrate increased lower extremity muscle strength to allow for at least 20 consecutive repetitions of sit/stand with or without upper extremity support for  purpose of functional mobility by 8 visits.    Does the patient get SOB with  exertion?  None noted    How often (if at all) does pain interfere with patient's movements?  Less than daily

## 2024-11-25 ENCOUNTER — HOME CARE VISIT (OUTPATIENT)
Dept: HOME HEALTH SERVICES | Facility: HOME HEALTHCARE | Age: 84
End: 2024-11-25
Payer: MEDICARE

## 2024-11-25 VITALS
TEMPERATURE: 98.6 F | SYSTOLIC BLOOD PRESSURE: 114 MMHG | RESPIRATION RATE: 12 BRPM | HEART RATE: 80 BPM | DIASTOLIC BLOOD PRESSURE: 78 MMHG | OXYGEN SATURATION: 98 %

## 2024-11-25 PROCEDURE — G0151 HHCP-SERV OF PT,EA 15 MIN: HCPCS

## 2024-11-25 ASSESSMENT — ENCOUNTER SYMPTOMS
PAIN: 1
LOWEST PAIN SEVERITY IN PAST 24 HOURS: 0/10
PAIN SEVERITY GOAL: 0/10
PAIN: PAIN LIMITS FUNCTION LESS THAN DAILY
PERSON REPORTING PAIN: PATIENT
HIGHEST PAIN SEVERITY IN PAST 24 HOURS: 4/10
SUBJECTIVE PAIN PROGRESSION: WAXING AND WANING
PAIN LOCATION: RIGHT SHOULDER

## 2024-11-27 ENCOUNTER — HOME CARE VISIT (OUTPATIENT)
Dept: HOME HEALTH SERVICES | Facility: HOME HEALTHCARE | Age: 84
End: 2024-11-27
Payer: MEDICARE

## 2024-11-27 PROCEDURE — G0151 HHCP-SERV OF PT,EA 15 MIN: HCPCS

## 2024-11-28 VITALS
HEART RATE: 84 BPM | OXYGEN SATURATION: 96 % | TEMPERATURE: 98.9 F | SYSTOLIC BLOOD PRESSURE: 116 MMHG | RESPIRATION RATE: 16 BRPM | DIASTOLIC BLOOD PRESSURE: 54 MMHG

## 2024-11-28 ASSESSMENT — ENCOUNTER SYMPTOMS
DENIES PAIN: 1
HIGHEST PAIN SEVERITY IN PAST 24 HOURS: 0/10
PAIN SEVERITY GOAL: 0/10
PERSON REPORTING PAIN: PATIENT
LOWEST PAIN SEVERITY IN PAST 24 HOURS: 0/10
SUBJECTIVE PAIN PROGRESSION: WAXING AND WANING

## 2024-12-02 ENCOUNTER — HOME CARE VISIT (OUTPATIENT)
Dept: HOME HEALTH SERVICES | Facility: HOME HEALTHCARE | Age: 84
End: 2024-12-02
Payer: MEDICARE

## 2024-12-04 ENCOUNTER — HOME CARE VISIT (OUTPATIENT)
Dept: HOME HEALTH SERVICES | Facility: HOME HEALTHCARE | Age: 84
End: 2024-12-04
Payer: MEDICARE

## 2024-12-09 ENCOUNTER — HOME CARE VISIT (OUTPATIENT)
Dept: HOME HEALTH SERVICES | Facility: HOME HEALTHCARE | Age: 84
End: 2024-12-09
Payer: MEDICARE

## 2024-12-11 ENCOUNTER — HOME CARE VISIT (OUTPATIENT)
Dept: HOME HEALTH SERVICES | Facility: HOME HEALTHCARE | Age: 84
End: 2024-12-11
Payer: MEDICARE

## 2024-12-16 ENCOUNTER — HOME CARE VISIT (OUTPATIENT)
Dept: HOME HEALTH SERVICES | Facility: HOME HEALTHCARE | Age: 84
End: 2024-12-16
Payer: MEDICARE

## 2024-12-17 ENCOUNTER — HOME CARE VISIT (OUTPATIENT)
Dept: HOME HEALTH SERVICES | Facility: HOME HEALTHCARE | Age: 84
End: 2024-12-17
Payer: MEDICARE

## 2024-12-18 ENCOUNTER — HOME CARE VISIT (OUTPATIENT)
Dept: HOME HEALTH SERVICES | Facility: HOME HEALTHCARE | Age: 84
End: 2024-12-18
Payer: MEDICARE

## 2024-12-19 ENCOUNTER — HOME CARE VISIT (OUTPATIENT)
Dept: HOME HEALTH SERVICES | Facility: HOME HEALTHCARE | Age: 84
End: 2024-12-19
Payer: MEDICARE

## 2024-12-19 ASSESSMENT — ACTIVITIES OF DAILY LIVING (ADL)
OASIS_M1830: 01
HOME_HEALTH_OASIS: 00

## 2024-12-19 ASSESSMENT — ENCOUNTER SYMPTOMS: DENIES PAIN: 1

## 2024-12-19 NOTE — Clinical Note
Noted.  ----- Message -----  From: Melissa Bergman, PT  Sent: 12/19/2024   6:20 PM PST  To: Sherry Perla R.N.; Leydi Sauer; *      Leydi please send to Dr. Herson Garcia.  Thank you    DISCHARGE NARRATIVE   Therapist performed an offsite [facility] discharge per patient request as she refused further nursing or therapy visits    CURRENT LEVEL OF FUNCTION:   Ambulation and Mobility: Unknown due to refusal of further physical therapy visits  Patient Equipment: walker for ambulation:   Transferring:  Unknown due to refusal of further physical therapy visits  Bathing:  Unknown due to refusal of further physical therapy visits  Dressing:  Unknown due to refusal of further physical therapy visits  Special equipment used: 4WW and other equipment at patient's sister is unknown    MEDICATION MANAGEMENT:  Medication management unknown due to refusal for further skilled nursing visits  Medication issues:   In the last 24 hours did the patient wear oxygen: no.  In the last 24 hours, when is the patient dyspneic or noticeably   Short of Breath : Unknown due to refusal of further visits.    DISCHARGE/TRANSITION PLAN  The discharge plan for home health is to discharge back to the community when individualized goals have been met.  Individualized goals during this episode of Home Health care were related to: Mobility, pain, balance, strength,.    Patient has met goals related to: Current status of goals is unknown but patient had met several goals at last physical therapy visit on 11/27/2024    Goals not met and why  Unknown due to refusal of further physical therapy visits    Patient is ready for discharge on 12/19/2024 per patient request with the following services in place: N/A

## 2024-12-19 NOTE — Clinical Note
Leydi please send to Dr. Herson Garcia.  Thank you    DISCHARGE NARRATIVE   Therapist performed an offsite [place] discharge per patient request as she refused further nursing or therapy visits    CURRENT LEVEL OF FUNCTION:   Ambulation and Mobility: Unknown due to refusal of further physical therapy visits  Patient Equipment: walker for ambulation:   Transferring:  Unknown due to refusal of further physical therapy visits  Bathing:  Unknown due to refusal of further physical therapy visits  Dressing:  Unknown due to refusal of further physical therapy visits  Special equipment used: 4WW and other equipment at patient's sister is unknown    MEDICATION MANAGEMENT:  Medication management unknown due to refusal for further skilled nursing visits  Medication issues:   In the last 24 hours did the patient wear oxygen: no.  In the last 24 hours, when is the patient dyspneic or noticeably   Short of Breath : Unknown due to refusal of further visits.    DISCHARGE/TRANSITION PLAN  The discharge plan for home health is to discharge back to the community when individualized goals have been met.  Individualized goals during this episode of Home Health care were related to: Mobility, pain, balance, strength,.    Patient has met goals related to: Current status of goals is unknown but patient had met several goals at last physical therapy visit on 11/27/2024    Goals not met and why  Unknown due to refusal of further physical therapy visits    Patient is ready for discharge on 12/19/2024 per patient request with the following services in place: N/A

## 2024-12-20 ENCOUNTER — HOME CARE VISIT (OUTPATIENT)
Dept: HOME HEALTH SERVICES | Facility: HOME HEALTHCARE | Age: 84
End: 2024-12-20
Payer: MEDICARE

## 2024-12-20 NOTE — CASE COMMUNICATION
Quality Review for MD [facility] by NIKITA Arteaga, RN on  December 20, 2024    Edits completed by NIKITA Arteaga RN:  1. Changed  to therapeutic diet at MD and in the last 7 days as patient has HTN and has been educated on the need to follow a low sodium diet per the care plan  2. Changed  to no per chart review

## 2024-12-20 NOTE — DISCHARGE SUMMARY
DISCHARGE NARRATIVE    CURRENT LEVEL OF FUNCTION:   Ambulation and Mobility: Unknown due to refusal of further physical therapy visits  Patient Equipment: walker for ambulation:   Transferring:  Unknown due to refusal of further physical therapy visits  Bathing:  Unknown due to refusal of further physical therapy visits  Dressing:  Unknown due to refusal of further physical therapy visits  Special equipment used: 4WW and other equipment at patient's sister is unknown    MEDICATION MANAGEMENT:  Medication management unknown due to refusal for further skilled nursing visits  Medication issues:   In the last 24 hours did the patient wear oxygen: no.  In the last 24 hours, when is the patient dyspneic or noticeably   Short of Breath : Unknown due to refusal of further visits.    DISCHARGE/TRANSITION PLAN  The discharge plan for home health is to discharge back to the community when individualized goals have been met.  Individualized goals during this episode of Home Health care were related to: Mobility, pain, balance, strength,.    Patient has met goals related to: Current status of goals is unknown but patient had met several goals at last physical therapy visit on 11/27/2024    Goals not met and why  Unknown due to refusal of further physical therapy visits    Patient is ready for discharge on 12/19/2024 per patient request with the following services in place: N/A

## 2024-12-20 NOTE — Clinical Note
agree with changes Thanks  ----- Message -----  From: Gabriella Arteaga R.N.  Sent: 12/20/2024   9:20 AM PST  To: Melissa Bergman, PT      Quality Review for MD OASIS by NIKITA Arteaga, RN on  December 20, 2024    Edits completed by NIKITA Arteaga, RN:  1. Changed  to therapeutic diet at MD and in the last 7 days as patient has HTN and has been educated on the need to follow a low sodium diet per the care plan  2. Changed  to no per chart review

## 2025-02-20 ENCOUNTER — HOSPITAL ENCOUNTER (OUTPATIENT)
Dept: LAB | Facility: MEDICAL CENTER | Age: 85
End: 2025-02-20
Attending: FAMILY MEDICINE
Payer: MEDICARE

## 2025-02-20 LAB
ALBUMIN SERPL BCP-MCNC: 4 G/DL (ref 3.2–4.9)
ALBUMIN/GLOB SERPL: 1.1 G/DL
ALP SERPL-CCNC: 113 U/L (ref 30–99)
ALT SERPL-CCNC: 30 U/L (ref 2–50)
ANION GAP SERPL CALC-SCNC: 13 MMOL/L (ref 7–16)
AST SERPL-CCNC: 41 U/L (ref 12–45)
BILIRUB SERPL-MCNC: 0.4 MG/DL (ref 0.1–1.5)
BUN SERPL-MCNC: 19 MG/DL (ref 8–22)
CALCIUM ALBUM COR SERPL-MCNC: 9.9 MG/DL (ref 8.5–10.5)
CALCIUM SERPL-MCNC: 9.9 MG/DL (ref 8.5–10.5)
CHLORIDE SERPL-SCNC: 105 MMOL/L (ref 96–112)
CHOLEST SERPL-MCNC: 156 MG/DL (ref 100–199)
CO2 SERPL-SCNC: 21 MMOL/L (ref 20–33)
CREAT SERPL-MCNC: 0.89 MG/DL (ref 0.5–1.4)
EST. AVERAGE GLUCOSE BLD GHB EST-MCNC: 123 MG/DL
FASTING STATUS PATIENT QL REPORTED: NORMAL
GFR SERPLBLD CREATININE-BSD FMLA CKD-EPI: 64 ML/MIN/1.73 M 2
GLOBULIN SER CALC-MCNC: 3.5 G/DL (ref 1.9–3.5)
GLUCOSE SERPL-MCNC: 121 MG/DL (ref 65–99)
HBA1C MFR BLD: 5.9 % (ref 4–5.6)
HDLC SERPL-MCNC: 68 MG/DL
LDLC SERPL CALC-MCNC: 76 MG/DL
POTASSIUM SERPL-SCNC: 3.7 MMOL/L (ref 3.6–5.5)
PROT SERPL-MCNC: 7.5 G/DL (ref 6–8.2)
SODIUM SERPL-SCNC: 139 MMOL/L (ref 135–145)
T4 FREE SERPL-MCNC: 1.98 NG/DL (ref 0.93–1.7)
TRIGL SERPL-MCNC: 61 MG/DL (ref 0–149)
TSH SERPL-ACNC: 0.19 UIU/ML (ref 0.35–5.5)

## 2025-02-20 PROCEDURE — 84439 ASSAY OF FREE THYROXINE: CPT

## 2025-02-20 PROCEDURE — 84443 ASSAY THYROID STIM HORMONE: CPT

## 2025-02-20 PROCEDURE — 80053 COMPREHEN METABOLIC PANEL: CPT

## 2025-02-20 PROCEDURE — 83036 HEMOGLOBIN GLYCOSYLATED A1C: CPT

## 2025-02-20 PROCEDURE — 80061 LIPID PANEL: CPT

## 2025-02-20 PROCEDURE — 36415 COLL VENOUS BLD VENIPUNCTURE: CPT

## 2025-05-03 ENCOUNTER — HOSPITAL ENCOUNTER (EMERGENCY)
Facility: MEDICAL CENTER | Age: 85
End: 2025-05-03
Attending: EMERGENCY MEDICINE
Payer: MEDICARE

## 2025-05-03 VITALS
HEIGHT: 58 IN | HEART RATE: 78 BPM | RESPIRATION RATE: 18 BRPM | DIASTOLIC BLOOD PRESSURE: 84 MMHG | WEIGHT: 125 LBS | TEMPERATURE: 98.1 F | OXYGEN SATURATION: 92 % | SYSTOLIC BLOOD PRESSURE: 184 MMHG | BODY MASS INDEX: 26.24 KG/M2

## 2025-05-03 DIAGNOSIS — I10 HYPERTENSION, UNSPECIFIED TYPE: ICD-10-CM

## 2025-05-03 LAB
ANION GAP SERPL CALC-SCNC: 10 MMOL/L (ref 7–16)
BUN SERPL-MCNC: 13 MG/DL (ref 8–22)
CALCIUM SERPL-MCNC: 9.4 MG/DL (ref 8.5–10.5)
CHLORIDE SERPL-SCNC: 102 MMOL/L (ref 96–112)
CO2 SERPL-SCNC: 22 MMOL/L (ref 20–33)
CREAT SERPL-MCNC: 0.81 MG/DL (ref 0.5–1.4)
EKG IMPRESSION: NORMAL
GFR SERPLBLD CREATININE-BSD FMLA CKD-EPI: 72 ML/MIN/1.73 M 2
GLUCOSE SERPL-MCNC: 94 MG/DL (ref 65–99)
POTASSIUM SERPL-SCNC: 4.3 MMOL/L (ref 3.6–5.5)
SODIUM SERPL-SCNC: 134 MMOL/L (ref 135–145)

## 2025-05-03 PROCEDURE — 93005 ELECTROCARDIOGRAM TRACING: CPT | Mod: TC | Performed by: EMERGENCY MEDICINE

## 2025-05-03 PROCEDURE — 700102 HCHG RX REV CODE 250 W/ 637 OVERRIDE(OP): Mod: UD | Performed by: EMERGENCY MEDICINE

## 2025-05-03 PROCEDURE — 99284 EMERGENCY DEPT VISIT MOD MDM: CPT

## 2025-05-03 PROCEDURE — 80048 BASIC METABOLIC PNL TOTAL CA: CPT

## 2025-05-03 PROCEDURE — 93005 ELECTROCARDIOGRAM TRACING: CPT | Mod: TC

## 2025-05-03 PROCEDURE — 36415 COLL VENOUS BLD VENIPUNCTURE: CPT

## 2025-05-03 PROCEDURE — A9270 NON-COVERED ITEM OR SERVICE: HCPCS | Mod: UD | Performed by: EMERGENCY MEDICINE

## 2025-05-03 RX ORDER — DIAZEPAM 2 MG/1
2 TABLET ORAL ONCE
Status: COMPLETED | OUTPATIENT
Start: 2025-05-03 | End: 2025-05-03

## 2025-05-03 RX ORDER — ACETAMINOPHEN 325 MG/1
650 TABLET ORAL ONCE
Status: COMPLETED | OUTPATIENT
Start: 2025-05-03 | End: 2025-05-03

## 2025-05-03 RX ORDER — HYDROCHLOROTHIAZIDE 12.5 MG/1
12.5 TABLET ORAL ONCE
Status: COMPLETED | OUTPATIENT
Start: 2025-05-03 | End: 2025-05-03

## 2025-05-03 RX ADMIN — HYDROCHLOROTHIAZIDE 12.5 MG: 12.5 TABLET ORAL at 15:48

## 2025-05-03 RX ADMIN — ACETAMINOPHEN 650 MG: 325 TABLET ORAL at 15:49

## 2025-05-03 RX ADMIN — DIAZEPAM 2 MG: 2 TABLET ORAL at 15:48

## 2025-05-03 ASSESSMENT — FIBROSIS 4 INDEX
FIB4 SCORE: 2.96
FIB4 SCORE: 2.96

## 2025-05-03 ASSESSMENT — PAIN DESCRIPTION - PAIN TYPE: TYPE: ACUTE PAIN

## 2025-05-03 NOTE — ED PROVIDER NOTES
ER Provider Note    Scribed for Gerardo Zepeda M.D. by Krista Barba. 5/3/2025  3:39 PM    Primary Care Provider: Herson Garcia M.D.    CHIEF COMPLAINT  Chief Complaint   Patient presents with    Dizziness     Onset today.     Hypertension     Pt takes BP daily. The last three days it has been high. Pt states her BP medication is not working anymore    Headache     Three days     EXTERNAL RECORDS REVIEWED  Outside records show she is followed by Dr. Herson Garcia as for primary care, and he prescribes her medications.    HPI/ROS  LIMITATION TO HISTORY   Select: : None    OUTSIDE HISTORIAN(S):  Daughter at bedside contributes to history and reports the patient has been under a lot of stress.    Debra Stockton is a 85 y.o. female who presents to the ED complaining of hypertension onset 3 days ago.  She is asymptomatic with respect to this hypertension.  She has had occasional dull headaches, but not at the bedside.  No chest pain or shortness of breath.  No change in urine output.  Patient believes her blood pressure medication is not working anymore. She states she used to take lisinopril but changed to losartan 40 mg.  However, this is not a recent change.  She denies fevers, chills, chest pain, cough, nausea, vomiting, or changes in urine output.  Daughter at bedside reports the patient has been quite stressed, as above.    PAST MEDICAL HISTORY  Past Medical History:   Diagnosis Date    Arthritis     Dental disorder     dentures Upper and lower    Glaucoma     Heart burn     Hypertension     Indigestion     Other specified disorder of intestines     constipation    Unspecified cataract     dylan IOL    Unspecified disorder of thyroid     Unspecified essential hypertension 10/30/2014    Unspecified hypothyroidism 10/30/2014       SURGICAL HISTORY  Past Surgical History:   Procedure Laterality Date    CERVICAL DISK AND FUSION ANTERIOR N/A 6/25/2018    Procedure: CERVICAL DISK AND FUSION ANTERIOR/ C4-7;   Surgeon: Lester Doyle M.D.;  Location: SURGERY [facility];  Service: Neurosurgery    CORPECTOMY N/A 6/25/2018    Procedure: CORPECTOMY/ C5;  Surgeon: Lester Doyle M.D.;  Location: SURGERY [facility];  Service: Neurosurgery    BLEPHAROPLASTY Left 9/7/2016    Procedure:  PERMANENT TEMPORAL PARTIAL TARSORRHAPHY;  Surgeon: Alphonso Montoya M.D.;  Location: SURGERY [facility];  Service:     EYE FOREIGN BODY REMOVAL Left 1/29/2016    Procedure: EYE FOREIGN BODY REMOVAL  FOR: REMOVAL OF UPPER EYELID WEIGHT IMPLANT;  Surgeon: Alphonso Montoya M.D.;  Location: SURGERY SAME DAY [facility] ORS;  Service:     LUMBAR LAMINECTOMY DISKECTOMY Left 7/17/2015    Procedure: LUMBAR LAMINECTOMY DISKECTOMY POSTERIOR L5-S1 MICRO;  Surgeon: Abel Salazar M.D.;  Location: SURGERY [facility];  Service:     LID TIGHTENING  8/27/2014    Performed by Omer Servin M.D. at [facility]    EYE FOREIGN BODY REMOVAL  5/20/2014    Performed by Omer Servin M.D. at [facility]    OTHER  2004    left ear tumor removed    APPENDECTOMY  1996    OTHER  1952    tonsillectomy    OTHER  2-3yrs ago    l eye  with multiple surgeries    OTHER ABDOMINAL SURGERY  over 40 yrs ago    gavino wagner       FAMILY HISTORY  Family History   Problem Relation Age of Onset    Diabetes Mother     Cancer Father     Diabetes Father     Diabetes Brother        SOCIAL HISTORY   reports that she has never smoked. She has never used smokeless tobacco. She reports that she does not drink alcohol and does not use drugs.    CURRENT MEDICATIONS  Discharge Medication List as of 5/3/2025  5:19 PM        CONTINUE these medications which have NOT CHANGED    Details   Acetaminophen 500 MG Cap Take 1,000 mg by mouth every 6 hours as needed (pain). Indications: Pain, Historical Med      Multiple Vitamins-Minerals (OCUVITE ADULT 50+) Cap Take 1 Capsule by mouth every day. Indications: supplement, Historical Med     "  diazePAM (VALIUM) 2 MG Tab Take 1 tablet every day by oral route as needed., Disp-30 Tablet, R-3, Normal      hydroCHLOROthiazide 12.5 MG tablet Take 1 tablet every day by oral route., Disp-90 Tablet, R-3, Normal      !! dorzolamide-timolol (COSOPT) 2-0.5 % Solution Instill 1 drop into affected eye twice a dayUSE Rx DISCOUNT CARD: $25,  BIN:[id number],  PCN:[id number],  Group:[id number],  ID:[id number]-10 mL, R-4, Normal      !! dorzolamide-timolol (COSOPT) 2-0.5 % Solution Instill 1 drop into right eye twice a dayUSE Rx DISCOUNT CARD: $25,  BIN:[id number],  PCN:ALL,  Group:[id number],  ID:[id number]-10 mL, R-3, Normal      methylPREDNISolone (MEDROL) 4 MG Tablet Therapy Pack Take 1 dose pack as directed per package instructions., Disp-21 Tablet, R-0, Normal      olmesartan (BENICAR) 40 MG Tab Take 1 Tablet by mouth every day., Disp-100 Tablet, R-3, Print Rx Paper      levothyroxine (SYNTHROID) 100 MCG Tab Take 100 mcg by mouth every day. Indications: Underactive Thyroid, Historical Med      lidocaine (LIDODERM) 5 % Patch Place 1 Patch on the skin every 24 hours., Disp-10 Patch, R-0, Normal      Loratadine (CLARITIN) 10 MG Cap Take 10 mg by mouth 1 time a day as needed., Disp-30 capsule, R-0, Normal      meclizine (ANTIVERT) 25 MG Tab Take 1 Tab by mouth 3 times a day as needed., Disp-90 Tab, R-3, Normal       !! - Potential duplicate medications found. Please discuss with provider.          ALLERGIES  Amlodipine    PHYSICAL EXAM  VITAL SIGNS: BP (!) 182/84   Pulse (!) 106   Temp 36.7 °C (98.1 °F) (Temporal)   Resp 14   Ht 1.473 m (4' 10\")   Wt 56.7 kg (125 lb)   SpO2 95%   BMI 26.13 kg/m²   Pulse ox interpretation: I interpret this pulse ox as normal.  Constitutional: Alert in no apparent distress.  HENT: No signs of trauma, Bilateral external ears normal, Nose normal.   Eyes: Conjunctiva normal, Non-icteric.   Neck: Normal range of motion, Supple, No stridor.   Lymphatic: No lymphadenopathy noted.   Cardiovascular: " Regular rate and rhythm, no murmurs.   Thorax & Lungs: Normal breath sounds, No respiratory distress, No wheezing  Abdomen: Bowel sounds normal, Soft, No tenderness, No masses, No pulsatile masses. No peritoneal signs.  Skin: Warm, Dry, No erythema, No rash.   Back: No CVA tenderness.  Extremities: Intact distal pulses, No edema, No cyanosis.  Musculoskeletal: Good range of motion in all major joints. No or major deformities noted.   Neurologic: Alert , Normal motor function, Normal sensory function, No focal deficits noted.   Psychiatric: Affect normal, Judgment normal, Mood normal.         DIAGNOSTIC STUDIES    Labs:   Labs Reviewed   BASIC METABOLIC PANEL - Abnormal; Notable for the following components:       Result Value    Sodium 134 (*)     All other components within normal limits   ESTIMATED GFR       EKG:   I have independently interpreted this EKG  Results for orders placed or performed during the hospital encounter of 25   EKG   Result Value Ref Range    Report       [facility]    Test Date:  2025  Pt Name:    SARAY WYNN              Department: ER  MRN:        [id number]                      Room:       [facility]  Gender:     Female                       Technician: [id number]  :        [id number]                   Requested By:ER TRIAGE PROTOCOL  Order #:    [id number]                    Reading MD: CONNIE CALLOWAY MD    Measurements  Intervals                                Axis  Rate:       106                          P:          36  WY:         176                          QRS:        31  QRSD:       93                           T:          11  QT:         312  QTc:        415    Interpretive Statements  Sinus tachycardia  Probable left atrial enlargement  Baseline wander in lead(s) II,III,aVF,V1,V2,V3,V5,V6  Compared to ECG 10/26/2024 01:45:51  ST (T wave) deviation no longer present  T-wave abnormality no longer present  Q waves no longer  present  Electronically Signed On 05- 15:39:51 PDT by VIDYA CALLOWAY MD         COURSE & MEDICAL DECISION MAKING     INITIAL ASSESSMENT, COURSE AND PLAN  Care Narrative:       3:39 PM Patient presents to the ED with hypertension. Per triage protocol, EKG and BMP was ordered. Patient evaluated at bedside and discussed plan of care, including labs and medication. Patient will be treated with Tylenol tablet 650 mg, hydrochlorothiazide tablet 12.5 mg and Valium tablet 2 mg. Differential diagnoses include but not limited to: Medication treatment failure much less likely than anxiety reaction or physiological response to stress. Less likely dehydration or kidney disease, No reason to suspect end organ damage to brain or heart.     6:00 PM chemistry does not show any suggestion of kidney injury, or other abnormality.  I returned to the bedside and discussed all of this with the patient and her daughter, explaining that there is no indication for blood pressure medication changes, and we discussed the appropriate way to monitor blood pressure when calm, not stressed, not as needed, to get accurate results, track them, and follow-up with primary care.  The patient and daughter are happy to be discharged home.      DISPOSITION AND DISCUSSIONS  I have discussed management of the patient with the following physicians and RANJIT's:  None    Discussion of management with other QHP or appropriate source(s): None     Escalation of care considered, and ultimately not performed: acute inpatient care management, however at this time, the patient is most appropriate for outpatient management.    Barriers to care at this time, including but not limited to:  None .     Decision tools and prescription drugs considered including, but not limited to: Medication modification considered, but not indicated for asymptomatic hypertension in the setting of acute stress .    The patient will return for new or worsening symptoms and is  stable at the time of discharge.    The patient is referred to a primary physician for blood pressure management, diabetic screening, and for all other preventative health concerns.    DISPOSITION:  Patient will be discharged home in stable condition.    FOLLOW UP:  Herson Garcia M.D.  [address] [place] [address] [address] [place] [id number]  [phone]    Call in 2 days        OUTPATIENT MEDICATIONS:  Discharge Medication List as of 5/3/2025  5:19 PM           FINAL DIAGNOSIS  1. Hypertension, unspecified type            I, Krista Barba (Malcolmibtiana), am scribing for, and in the presence of, Gerardo Zepeda M.D..    Electronically signed by: Krista Barba (Javiere), 5/3/2025    IGerardo M.D. personally performed the services described in this documentation, as scribed by Krista Barba in my presence, and it is both accurate and complete.

## 2025-05-03 NOTE — DISCHARGE PLANNING
"TCN following. HTH/SCP chart review completed. Note pt currently in ED secondary to dizziness, hypertension, HA.      Per chart review patient note pt with prior admission to [facility] last October. She was subsequently dc'd to home with HH per therapy recommendations. Note that pt was ambulatory with a 4WW at that time. Note that pt was enrolled with [facility] until December when she declined additional HH care. Would note per PT note from last [facility] visit, pt had \"multiple family members at bedside and on the phone caring for her; they also report they can provide 24/7 'if it comes to that' as she does not want to be in a facility\".     Per review, no noted ambulatory status yet documented. Pt remains on RA. At this time unclear if pt remains functionally capable of dc to home. Per chart patient has non [facility] PCP.     If pt functionally capable of dc to home, given current chart review, pt may benefit from HH services at time of dc from ED.      If pt does NOT warrant admission to [facility] and is functionally unable to dc to home, please reach out to TCN for assistance with SCP auth for dc directly to SNF from ED if needed.      If pt admits to [facility], TCN will continue to monitor and assist with transitional care needs as indicated. Please reach out to TCN via VOALTE if post acute transitional care needs are warranted for dc planning.   "

## 2025-05-03 NOTE — ED TRIAGE NOTES
"Chief Complaint   Patient presents with    Dizziness     Onset today.     Hypertension     Pt takes BP daily. The last three days it has been high. Pt states her BP medication is not working anymore    Headache     Three days      Pt lives alone. Called EMS due to above complaints.   86 yo female brought in by EMS for above complaint. A&Ox4, GCS 15.     Patient was given no meds en route. Blood glucose  104    BP (!) 182/84   Pulse (!) 106   Temp 36.7 °C (98.1 °F) (Temporal)   Resp 14   Ht 1.473 m (4' 10\")   Wt 56.7 kg (125 lb)   SpO2 95%   BMI 26.13 kg/m²          "

## 2025-05-04 NOTE — DISCHARGE INSTRUCTIONS
Skip pruebas aquí fueron tranquilizadoras. No hay indicios de ninguna afección peligrosa por tener la presión arterial ligeramente más trip de lo habitual rashaad unos días. [place] ocurre con frecuencia y no es motivo para cambiar skip medicamentos inmediatamente. Continúe tomando skip medicamentos en casa exactamente angela se los recetaron. No tome ningún medicamento adicional. No cambie skip medicamentos en casa. Controle gerardo presión arterial elvia vez al día, yoselin nunca cuando esté estresado, preocupado o incómodo. [place] siempre le dará lecturas falsamente altas. Llame el lunes para programar elvia angel de seguimiento con gerardo médico de cabecera.    Your tests here were reassuring.  There is no sign of any dangerous condition from your blood pressure running a little higher than usual for a few days.  This happens frequently, and is not a reason to immediately change your medications.  Please continue your home medications exactly as prescribed.  Do not take any extra medication.  Do not change your home medications.  Check your blood pressure once a day, but never when you are stressed or worried or uncomfortable.  These will always give you falsely high readings.  Call Monday to schedule follow-up with your primary care provider.

## 2025-05-04 NOTE — DISCHARGE PLANNING
TCN following. HTH/SCP chart review completed. Per chart patient discharged home. Noted per RN note; patient encouraged to f/u with PCP.  Noted per prior TCN note patient has non [facility] PCP.  Noted no mobility documented in chart and no ERP note at this time.

## 2025-05-20 ENCOUNTER — HOSPITAL ENCOUNTER (OUTPATIENT)
Dept: LAB | Facility: MEDICAL CENTER | Age: 85
End: 2025-05-20
Attending: FAMILY MEDICINE
Payer: MEDICARE

## 2025-05-20 LAB
T4 FREE SERPL-MCNC: 1.66 NG/DL (ref 0.93–1.7)
TSH SERPL-ACNC: 0.61 UIU/ML (ref 0.38–5.33)

## 2025-05-20 PROCEDURE — 84439 ASSAY OF FREE THYROXINE: CPT

## 2025-05-20 PROCEDURE — 36415 COLL VENOUS BLD VENIPUNCTURE: CPT

## 2025-05-20 PROCEDURE — 84443 ASSAY THYROID STIM HORMONE: CPT

## (undated) DEVICE — LACTATED RINGERS INJ 1000 ML - (14EA/CA 60CA/PF)

## (undated) DEVICE — GLOVE BIOGEL SZ 6.5 SURGICAL PF LTX (50PR/BX 4BX/CA)

## (undated) DEVICE — PACK NEURO - (2EA/CA)

## (undated) DEVICE — INTRAOP NEURO IN OR 1:1 PER 15 MIN

## (undated) DEVICE — GLOVE BIOGEL PI INDICATOR SZ 7.0 SURGICAL PF LF - (50/BX 4BX/CA)

## (undated) DEVICE — KIT ROOM DECONTAMINATION

## (undated) DEVICE — SLEEVE, VASO, THIGH, MED

## (undated) DEVICE — SET EXTENSION WITH 2 PORTS (48EA/CA) ***PART #2C8610 IS A SUBSTITUTE*****

## (undated) DEVICE — SENSOR SPO2 NEO LNCS ADHESIVE (20/BX) SEE USER NOTES

## (undated) DEVICE — GLOVE BIOGEL SZ 8.5 SURGICAL PF LTX - (50PR/BX 4BX/CA)

## (undated) DEVICE — NEEDLE NON SAFETY HYPO 22 GA X 1 1/2 IN (100/BX)

## (undated) DEVICE — TOOL DISSECT MATCH HEAD

## (undated) DEVICE — TUBE E-T HI-LO CUFF 6.5MM (10EA/BX)

## (undated) DEVICE — TUBING CLEARLINK DUO-VENT - C-FLO (48EA/CA)

## (undated) DEVICE — SYRINGE SAFETY 10 ML 18 GA X 1 1/2 BLUNT LL (100/BX 4BX/CA)

## (undated) DEVICE — BOVIE BLADE COATED &INSULATED - 25/PK

## (undated) DEVICE — DRESSING TRANSPARENT FILM TEGADERM 4 X 4.75" (50EA/BX)"

## (undated) DEVICE — TUBING C&T SET FLYING LEADS DRAIN TUBING (10EA/BX)

## (undated) DEVICE — GLOVE BIOGEL PI ORTHO SZ 6 1/2 SURGICAL PF LF (40PR/BX)

## (undated) DEVICE — MASK ANESTHESIA ADULT  - (100/CA)

## (undated) DEVICE — KIT SURGIFLO W/OUT THROMBIN - (6EA/CA)

## (undated) DEVICE — SUTURE GENERAL

## (undated) DEVICE — SHEET THYROID - (10EA/CA)

## (undated) DEVICE — ELECTRODE DUAL RETURN W/ CORD - (50/PK)

## (undated) DEVICE — NEEDLE SPINAL NON-SAFETY 18 GA X 3 IN (25EA/BX)

## (undated) DEVICE — CANISTER SUCTION 3000ML MECHANICAL FILTER AUTO SHUTOFF MEDI-VAC NONSTERILE LF DISP  (40EA/CA)

## (undated) DEVICE — KIT ANESTHESIA W/CIRCUIT & 3/LT BAG W/FILTER (20EA/CA)

## (undated) DEVICE — CHLORAPREP 26 ML APPLICATOR - ORANGE TINT(25/CA)

## (undated) DEVICE — SET LEADWIRE 5 LEAD BEDSIDE DISPOSABLE ECG (1SET OF 5/EA)

## (undated) DEVICE — APPLICATOR COTTON TIP 6 IN - STERILE (2EA/PK 100PK/BX)

## (undated) DEVICE — LACTATED RINGERS INJ. 500 ML - (24EA/CA)

## (undated) DEVICE — SYRINGE SAFETY 3 ML 18 GA X 1 1/2 BLUNT LL (100/BX 8BX/CA)

## (undated) DEVICE — SYRINGE ASEPTO - (50EA/CA

## (undated) DEVICE — DRAPE MICROSCOPE X-LONG (10EA/CA)

## (undated) DEVICE — SCREW DISTRACTION 14MM YELLOW - STERILE (10EA/BX) (5TX4=20)

## (undated) DEVICE — SPONGE GAUZESTER. 2X2 4-PL - (2/PK 50PK/BX 30BX/CS)

## (undated) DEVICE — MIDAS LUBRICATOR DIFFUSER PACK (4EA/CA)

## (undated) DEVICE — DRAPE LARGE 3 QUARTER - (20/CA)

## (undated) DEVICE — RESTRAINTS LIMB DISP. - (12/BX 4BX/CA)

## (undated) DEVICE — HEAD HOLDER JUNIOR/ADULT

## (undated) DEVICE — PROTECTOR ULNA NERVE - (36PR/CA)

## (undated) DEVICE — ELECTRODE 850 FOAM ADHESIVE - HYDROGEL RADIOTRNSPRNT (50/PK)

## (undated) DEVICE — SPONGE PEANUT - (5/PK 50PK/CA)

## (undated) DEVICE — GLOVE BIOGEL INDICATOR SZ 6.5 SURGICAL PF LTX - (50PR/BX 4BX/CA)

## (undated) DEVICE — NEPTUNE 4 PORT MANIFOLD - (20/PK)

## (undated) DEVICE — SUCTION INSTRUMENT YANKAUER BULBOUS TIP W/O VENT (50EA/CA)

## (undated) DEVICE — SUTURE 3-0 VICRYL PLUS SH - 8X 18 INCH (12/BX)

## (undated) DEVICE — BLADE SURGICAL #15 - (50/BX 3BX/CA)

## (undated) DEVICE — SODIUM CHL IRRIGATION 0.9% 1000ML (12EA/CA)

## (undated) DEVICE — GOWN WARMING STANDARD FLEX - (30/CA)

## (undated) DEVICE — SUTURE 4-0 MONOCRYL PLUS PS-2 - 27 INCH (36/BX)